# Patient Record
Sex: FEMALE | Race: WHITE | NOT HISPANIC OR LATINO | URBAN - METROPOLITAN AREA
[De-identification: names, ages, dates, MRNs, and addresses within clinical notes are randomized per-mention and may not be internally consistent; named-entity substitution may affect disease eponyms.]

---

## 2019-08-30 ENCOUNTER — OUTPATIENT (OUTPATIENT)
Dept: OUTPATIENT SERVICES | Facility: HOSPITAL | Age: 44
LOS: 1 days | End: 2019-08-30

## 2019-08-31 ENCOUNTER — EMERGENCY (EMERGENCY)
Facility: HOSPITAL | Age: 44
LOS: 1 days | End: 2019-08-31
Admitting: EMERGENCY MEDICINE
Payer: MEDICAID

## 2019-08-31 PROCEDURE — 76080 X-RAY EXAM OF FISTULA: CPT | Mod: 26

## 2019-08-31 PROCEDURE — 99284 EMERGENCY DEPT VISIT MOD MDM: CPT

## 2019-10-22 ENCOUNTER — OUTPATIENT (OUTPATIENT)
Dept: OUTPATIENT SERVICES | Facility: HOSPITAL | Age: 44
LOS: 1 days | End: 2019-10-22

## 2019-10-22 ENCOUNTER — INPATIENT (INPATIENT)
Facility: HOSPITAL | Age: 44
LOS: 29 days | Discharge: NURSING, REHAB OR LONG-TERM CARE | End: 2019-11-21
Attending: FAMILY MEDICINE
Payer: MEDICAID

## 2019-10-22 PROCEDURE — 71045 X-RAY EXAM CHEST 1 VIEW: CPT | Mod: 26

## 2019-10-22 PROCEDURE — 74176 CT ABD & PELVIS W/O CONTRAST: CPT | Mod: 26

## 2019-10-22 PROCEDURE — 99291 CRITICAL CARE FIRST HOUR: CPT

## 2019-10-23 PROCEDURE — 74176 CT ABD & PELVIS W/O CONTRAST: CPT | Mod: 26

## 2019-10-23 PROCEDURE — 93010 ELECTROCARDIOGRAM REPORT: CPT | Mod: 76

## 2019-10-24 PROCEDURE — 93306 TTE W/DOPPLER COMPLETE: CPT | Mod: 26

## 2019-10-24 PROCEDURE — 71045 X-RAY EXAM CHEST 1 VIEW: CPT | Mod: 26

## 2019-10-25 ENCOUNTER — OUTPATIENT (OUTPATIENT)
Dept: OUTPATIENT SERVICES | Facility: HOSPITAL | Age: 44
LOS: 1 days | End: 2019-10-25

## 2019-10-26 ENCOUNTER — OUTPATIENT (OUTPATIENT)
Dept: OUTPATIENT SERVICES | Facility: HOSPITAL | Age: 44
LOS: 1 days | End: 2019-10-26

## 2019-10-26 PROCEDURE — 71045 X-RAY EXAM CHEST 1 VIEW: CPT | Mod: 26

## 2019-10-27 ENCOUNTER — OUTPATIENT (OUTPATIENT)
Dept: OUTPATIENT SERVICES | Facility: HOSPITAL | Age: 44
LOS: 1 days | End: 2019-10-27

## 2019-10-28 ENCOUNTER — OUTPATIENT (OUTPATIENT)
Dept: OUTPATIENT SERVICES | Facility: HOSPITAL | Age: 44
LOS: 1 days | End: 2019-10-28

## 2019-10-29 ENCOUNTER — OUTPATIENT (OUTPATIENT)
Dept: OUTPATIENT SERVICES | Facility: HOSPITAL | Age: 44
LOS: 1 days | End: 2019-10-29

## 2019-10-29 PROCEDURE — 93010 ELECTROCARDIOGRAM REPORT: CPT

## 2019-10-30 ENCOUNTER — OUTPATIENT (OUTPATIENT)
Dept: OUTPATIENT SERVICES | Facility: HOSPITAL | Age: 44
LOS: 1 days | End: 2019-10-30

## 2019-10-30 PROCEDURE — 71045 X-RAY EXAM CHEST 1 VIEW: CPT | Mod: 26

## 2019-10-30 PROCEDURE — 93970 EXTREMITY STUDY: CPT | Mod: 26

## 2019-10-31 ENCOUNTER — OUTPATIENT (OUTPATIENT)
Dept: OUTPATIENT SERVICES | Facility: HOSPITAL | Age: 44
LOS: 1 days | End: 2019-10-31

## 2019-11-01 PROCEDURE — 71045 X-RAY EXAM CHEST 1 VIEW: CPT | Mod: 26

## 2019-11-03 ENCOUNTER — OUTPATIENT (OUTPATIENT)
Dept: OUTPATIENT SERVICES | Facility: HOSPITAL | Age: 44
LOS: 1 days | End: 2019-11-03

## 2019-11-04 ENCOUNTER — OUTPATIENT (OUTPATIENT)
Dept: OUTPATIENT SERVICES | Facility: HOSPITAL | Age: 44
LOS: 1 days | End: 2019-11-04

## 2019-11-04 PROCEDURE — 70450 CT HEAD/BRAIN W/O DYE: CPT | Mod: 26

## 2019-11-05 ENCOUNTER — OUTPATIENT (OUTPATIENT)
Dept: OUTPATIENT SERVICES | Facility: HOSPITAL | Age: 44
LOS: 1 days | End: 2019-11-05

## 2019-11-06 ENCOUNTER — OUTPATIENT (OUTPATIENT)
Dept: OUTPATIENT SERVICES | Facility: HOSPITAL | Age: 44
LOS: 1 days | End: 2019-11-06

## 2019-11-07 ENCOUNTER — OUTPATIENT (OUTPATIENT)
Dept: OUTPATIENT SERVICES | Facility: HOSPITAL | Age: 44
LOS: 1 days | End: 2019-11-07

## 2019-11-08 ENCOUNTER — OUTPATIENT (OUTPATIENT)
Dept: OUTPATIENT SERVICES | Facility: HOSPITAL | Age: 44
LOS: 1 days | End: 2019-11-08

## 2019-11-09 ENCOUNTER — OUTPATIENT (OUTPATIENT)
Dept: OUTPATIENT SERVICES | Facility: HOSPITAL | Age: 44
LOS: 1 days | End: 2019-11-09

## 2019-11-10 ENCOUNTER — OUTPATIENT (OUTPATIENT)
Dept: OUTPATIENT SERVICES | Facility: HOSPITAL | Age: 44
LOS: 1 days | End: 2019-11-10

## 2019-11-11 ENCOUNTER — OUTPATIENT (OUTPATIENT)
Dept: OUTPATIENT SERVICES | Facility: HOSPITAL | Age: 44
LOS: 1 days | End: 2019-11-11

## 2019-11-12 ENCOUNTER — OUTPATIENT (OUTPATIENT)
Dept: OUTPATIENT SERVICES | Facility: HOSPITAL | Age: 44
LOS: 1 days | End: 2019-11-12

## 2019-11-13 ENCOUNTER — OUTPATIENT (OUTPATIENT)
Dept: OUTPATIENT SERVICES | Facility: HOSPITAL | Age: 44
LOS: 1 days | End: 2019-11-13

## 2019-11-14 ENCOUNTER — OUTPATIENT (OUTPATIENT)
Dept: OUTPATIENT SERVICES | Facility: HOSPITAL | Age: 44
LOS: 1 days | End: 2019-11-14

## 2019-11-15 ENCOUNTER — OUTPATIENT (OUTPATIENT)
Dept: OUTPATIENT SERVICES | Facility: HOSPITAL | Age: 44
LOS: 1 days | End: 2019-11-15

## 2019-11-16 ENCOUNTER — OUTPATIENT (OUTPATIENT)
Dept: OUTPATIENT SERVICES | Facility: HOSPITAL | Age: 44
LOS: 1 days | End: 2019-11-16

## 2019-11-17 ENCOUNTER — OUTPATIENT (OUTPATIENT)
Dept: OUTPATIENT SERVICES | Facility: HOSPITAL | Age: 44
LOS: 1 days | End: 2019-11-17

## 2019-11-17 PROCEDURE — 71045 X-RAY EXAM CHEST 1 VIEW: CPT | Mod: 26

## 2019-11-18 ENCOUNTER — OUTPATIENT (OUTPATIENT)
Dept: OUTPATIENT SERVICES | Facility: HOSPITAL | Age: 44
LOS: 1 days | End: 2019-11-18

## 2019-11-19 ENCOUNTER — OUTPATIENT (OUTPATIENT)
Dept: OUTPATIENT SERVICES | Facility: HOSPITAL | Age: 44
LOS: 1 days | End: 2019-11-19

## 2019-11-20 ENCOUNTER — OUTPATIENT (OUTPATIENT)
Dept: OUTPATIENT SERVICES | Facility: HOSPITAL | Age: 44
LOS: 1 days | End: 2019-11-20

## 2019-11-21 ENCOUNTER — OUTPATIENT (OUTPATIENT)
Dept: OUTPATIENT SERVICES | Facility: HOSPITAL | Age: 44
LOS: 1 days | End: 2019-11-21

## 2019-11-22 ENCOUNTER — OUTPATIENT (OUTPATIENT)
Dept: OUTPATIENT SERVICES | Facility: HOSPITAL | Age: 44
LOS: 1 days | End: 2019-11-22

## 2019-11-29 ENCOUNTER — OUTPATIENT (OUTPATIENT)
Dept: OUTPATIENT SERVICES | Facility: HOSPITAL | Age: 44
LOS: 1 days | End: 2019-11-29

## 2019-11-30 ENCOUNTER — INPATIENT (INPATIENT)
Facility: HOSPITAL | Age: 44
LOS: 10 days | Discharge: NURSING, REHAB OR LONG-TERM CARE | End: 2019-12-11
Admitting: FAMILY MEDICINE
Payer: MEDICAID

## 2019-11-30 ENCOUNTER — OUTPATIENT (OUTPATIENT)
Dept: OUTPATIENT SERVICES | Facility: HOSPITAL | Age: 44
LOS: 1 days | End: 2019-11-30

## 2019-11-30 PROCEDURE — 71045 X-RAY EXAM CHEST 1 VIEW: CPT | Mod: 26

## 2019-11-30 PROCEDURE — 99285 EMERGENCY DEPT VISIT HI MDM: CPT

## 2019-12-01 PROCEDURE — 93970 EXTREMITY STUDY: CPT | Mod: 26

## 2019-12-03 ENCOUNTER — OUTPATIENT (OUTPATIENT)
Dept: OUTPATIENT SERVICES | Facility: HOSPITAL | Age: 44
LOS: 1 days | End: 2019-12-03

## 2019-12-04 ENCOUNTER — OUTPATIENT (OUTPATIENT)
Dept: OUTPATIENT SERVICES | Facility: HOSPITAL | Age: 44
LOS: 1 days | End: 2019-12-04

## 2019-12-05 ENCOUNTER — OUTPATIENT (OUTPATIENT)
Dept: OUTPATIENT SERVICES | Facility: HOSPITAL | Age: 44
LOS: 1 days | End: 2019-12-05

## 2019-12-06 ENCOUNTER — OUTPATIENT (OUTPATIENT)
Dept: OUTPATIENT SERVICES | Facility: HOSPITAL | Age: 44
LOS: 1 days | End: 2019-12-06

## 2019-12-07 ENCOUNTER — OUTPATIENT (OUTPATIENT)
Dept: OUTPATIENT SERVICES | Facility: HOSPITAL | Age: 44
LOS: 1 days | End: 2019-12-07

## 2019-12-08 ENCOUNTER — OUTPATIENT (OUTPATIENT)
Dept: OUTPATIENT SERVICES | Facility: HOSPITAL | Age: 44
LOS: 1 days | End: 2019-12-08

## 2019-12-09 ENCOUNTER — OUTPATIENT (OUTPATIENT)
Dept: OUTPATIENT SERVICES | Facility: HOSPITAL | Age: 44
LOS: 1 days | End: 2019-12-09

## 2019-12-10 ENCOUNTER — OUTPATIENT (OUTPATIENT)
Dept: OUTPATIENT SERVICES | Facility: HOSPITAL | Age: 44
LOS: 1 days | End: 2019-12-10

## 2019-12-10 PROCEDURE — 71045 X-RAY EXAM CHEST 1 VIEW: CPT | Mod: 26

## 2019-12-11 ENCOUNTER — EMERGENCY (EMERGENCY)
Facility: HOSPITAL | Age: 44
LOS: 1 days | End: 2019-12-11
Admitting: EMERGENCY MEDICINE
Payer: MEDICAID

## 2019-12-11 ENCOUNTER — OUTPATIENT (OUTPATIENT)
Dept: OUTPATIENT SERVICES | Facility: HOSPITAL | Age: 44
LOS: 1 days | End: 2019-12-11

## 2019-12-11 PROCEDURE — 99282 EMERGENCY DEPT VISIT SF MDM: CPT

## 2019-12-12 ENCOUNTER — OUTPATIENT (OUTPATIENT)
Dept: OUTPATIENT SERVICES | Facility: HOSPITAL | Age: 44
LOS: 1 days | End: 2019-12-12

## 2019-12-16 ENCOUNTER — INPATIENT (INPATIENT)
Facility: HOSPITAL | Age: 44
LOS: 1 days | Discharge: ANOTHER HOSPITAL | End: 2019-12-18
Payer: MEDICAID

## 2019-12-16 ENCOUNTER — OUTPATIENT (OUTPATIENT)
Dept: OUTPATIENT SERVICES | Facility: HOSPITAL | Age: 44
LOS: 1 days | End: 2019-12-16

## 2019-12-16 PROCEDURE — 99284 EMERGENCY DEPT VISIT MOD MDM: CPT

## 2019-12-16 PROCEDURE — 71045 X-RAY EXAM CHEST 1 VIEW: CPT | Mod: 26

## 2019-12-17 ENCOUNTER — OUTPATIENT (OUTPATIENT)
Dept: OUTPATIENT SERVICES | Facility: HOSPITAL | Age: 44
LOS: 1 days | End: 2019-12-17

## 2019-12-17 PROCEDURE — 93970 EXTREMITY STUDY: CPT | Mod: 26

## 2019-12-17 PROCEDURE — 99255 IP/OBS CONSLTJ NEW/EST HI 80: CPT

## 2019-12-17 PROCEDURE — 74177 CT ABD & PELVIS W/CONTRAST: CPT | Mod: 26

## 2019-12-18 ENCOUNTER — OUTPATIENT (OUTPATIENT)
Dept: OUTPATIENT SERVICES | Facility: HOSPITAL | Age: 44
LOS: 1 days | End: 2019-12-18

## 2019-12-18 ENCOUNTER — INPATIENT (INPATIENT)
Facility: HOSPITAL | Age: 44
LOS: 1 days | Discharge: HOME | DRG: 377 | End: 2019-12-20
Attending: INTERNAL MEDICINE | Admitting: HOSPITALIST
Payer: MEDICAID

## 2019-12-18 ENCOUNTER — TRANSCRIPTION ENCOUNTER (OUTPATIENT)
Age: 44
End: 2019-12-18

## 2019-12-18 VITALS
OXYGEN SATURATION: 99 % | DIASTOLIC BLOOD PRESSURE: 67 MMHG | TEMPERATURE: 99 F | RESPIRATION RATE: 14 BRPM | HEIGHT: 67 IN | SYSTOLIC BLOOD PRESSURE: 104 MMHG | WEIGHT: 119.93 LBS | HEART RATE: 79 BPM

## 2019-12-18 PROCEDURE — 99232 SBSQ HOSP IP/OBS MODERATE 35: CPT

## 2019-12-18 PROCEDURE — 99285 EMERGENCY DEPT VISIT HI MDM: CPT | Mod: 25

## 2019-12-18 PROCEDURE — 74175 CTA ABDOMEN W/CONTRAST: CPT | Mod: 26

## 2019-12-18 NOTE — ED ADULT NURSE NOTE - CHIEF COMPLAINT QUOTE
Patient A&Ox4, denies any pain or discomfort. Patient unable to speak but "mouths" words. Patient has Hx of TBI, sent from [facility] [place] due to GI bleed & needs to have surgery at [facility]. Hx of gastric bypass. Received 4 total units PRBC at [facility] [place].

## 2019-12-18 NOTE — ED ADULT NURSE NOTE - OBJECTIVE STATEMENT
Assumed pt care, pt is transfer from [facility] [place], pt is A+Ox4, offers no complaints of pain or discomfort. Pt received 4 units PRBC's at [facility] for GI bleed, pt arrives with Protonix gtt (finished, going through pt R arm PICC.) Pt states she initially was taken to [facility] for PRBPR. Pt has hx of TBI, unable to move extremities independently, pt has trach collar, no ventilator or trach at this time, open to air. Pt able to mouth words, and whisper slightly to communicate with staff. Assumed pt care, pt is transfer from [facility] [place], pt is A+Ox4, offers no complaints of pain or discomfort. Pt received 4 units PRBC's at [facility] for GI bleed, pt arrives with Protonix gtt (finished, going through pt R arm PICC.) Pt states she initially was taken to [facility] for black tarry stool. Pt has hx of TBI, unable to move extremities independently, pt has trach collar, no ventilator or trach at this time, open to air. Pt able to mouth words, and whisper slightly to communicate with staff. Assumed pt care, pt is transfer from [facility], pt is A+Ox4, offers no complaints of pain or discomfort. Pt received 4 units PRBC's at [facility] for GI bleed, pt arrives with Protonix gtt (finished, going through pt R arm PICC.) Pt states she initially was taken to [facility] for black tarry stool. Pt has hx of TBI, unable to move extremities independently, pt has trach collar, no ventilator or trach at this time, open to air. Pt able to mouth words, and whisper slightly to communicate with staff. Pt has midline R bicep placed "last week." Pt has [place] catheter placed "two days ago."
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
## 2019-12-18 NOTE — ED ADULT NURSE NOTE - ED STAT RN HANDOFF DETAILS
Pt handed off to RN WENDI Mckeon in stable condition. Pt remains on cardiac monitor and , oriented to unit, plan of care explained. Call bell given to pt and call bell system explained to pt, pt safety maintained. No apparent distress noted at this time. Receiving RN without any questions or concerns at time of handoff. Pt handed off to RN WENDI Banuelos in stable condition. Pt remains on cardiac monitor and , oriented to unit, plan of care explained. Call bell given to pt and call bell system explained to pt, pt safety maintained. No apparent distress noted at this time. Receiving RN without any questions or concerns at time of handoff.

## 2019-12-19 DIAGNOSIS — M54.2 CERVICALGIA: Chronic | ICD-10-CM

## 2019-12-19 DIAGNOSIS — K92.2 GASTROINTESTINAL HEMORRHAGE, UNSPECIFIED: ICD-10-CM

## 2019-12-19 DIAGNOSIS — Z98.84 BARIATRIC SURGERY STATUS: Chronic | ICD-10-CM

## 2019-12-19 DIAGNOSIS — I82.409 ACUTE EMBOLISM AND THROMBOSIS OF UNSPECIFIED DEEP VEINS OF UNSPECIFIED LOWER EXTREMITY: ICD-10-CM

## 2019-12-19 DIAGNOSIS — K92.1 MELENA: ICD-10-CM

## 2019-12-19 DIAGNOSIS — Z86.69 PERSONAL HISTORY OF OTHER DISEASES OF THE NERVOUS SYSTEM AND SENSE ORGANS: ICD-10-CM

## 2019-12-19 LAB
ABO RH CONFIRMATION: SIGNIFICANT CHANGE UP
ALBUMIN SERPL ELPH-MCNC: 2.3 G/DL — LOW (ref 3.3–5.2)
ALP SERPL-CCNC: 89 U/L — SIGNIFICANT CHANGE UP (ref 40–120)
ALT FLD-CCNC: 9 U/L — SIGNIFICANT CHANGE UP
ANION GAP SERPL CALC-SCNC: 9 MMOL/L — SIGNIFICANT CHANGE UP (ref 5–17)
APTT BLD: 31.8 SEC — SIGNIFICANT CHANGE UP (ref 27.5–36.3)
AST SERPL-CCNC: 19 U/L — SIGNIFICANT CHANGE UP
BASOPHILS # BLD AUTO: 0.03 K/UL — SIGNIFICANT CHANGE UP (ref 0–0.2)
BASOPHILS NFR BLD AUTO: 0.4 % — SIGNIFICANT CHANGE UP (ref 0–2)
BILIRUB SERPL-MCNC: 0.3 MG/DL — LOW (ref 0.4–2)
BLD GP AB SCN SERPL QL: SIGNIFICANT CHANGE UP
BUN SERPL-MCNC: 9 MG/DL — SIGNIFICANT CHANGE UP (ref 8–20)
CALCIUM SERPL-MCNC: 8.7 MG/DL — SIGNIFICANT CHANGE UP (ref 8.6–10.2)
CHLORIDE SERPL-SCNC: 112 MMOL/L — HIGH (ref 98–107)
CO2 SERPL-SCNC: 23 MMOL/L — SIGNIFICANT CHANGE UP (ref 22–29)
CREAT SERPL-MCNC: 0.58 MG/DL — SIGNIFICANT CHANGE UP (ref 0.5–1.3)
EOSINOPHIL # BLD AUTO: 0.37 K/UL — SIGNIFICANT CHANGE UP (ref 0–0.5)
EOSINOPHIL NFR BLD AUTO: 4.7 % — SIGNIFICANT CHANGE UP (ref 0–6)
GLUCOSE BLDC GLUCOMTR-MCNC: 79 MG/DL — SIGNIFICANT CHANGE UP (ref 70–99)
GLUCOSE SERPL-MCNC: 89 MG/DL — SIGNIFICANT CHANGE UP (ref 70–115)
HCT VFR BLD CALC: 34.7 % — SIGNIFICANT CHANGE UP (ref 34.5–45)
HCT VFR BLD CALC: 36.3 % — SIGNIFICANT CHANGE UP (ref 34.5–45)
HGB BLD-MCNC: 10.8 G/DL — LOW (ref 11.5–15.5)
HGB BLD-MCNC: 11.4 G/DL — LOW (ref 11.5–15.5)
IMM GRANULOCYTES NFR BLD AUTO: 1.5 % — SIGNIFICANT CHANGE UP (ref 0–1.5)
INR BLD: 1.35 RATIO — HIGH (ref 0.88–1.16)
LIDOCAIN IGE QN: 19 U/L — LOW (ref 22–51)
LYMPHOCYTES # BLD AUTO: 1.38 K/UL — SIGNIFICANT CHANGE UP (ref 1–3.3)
LYMPHOCYTES # BLD AUTO: 17.7 % — SIGNIFICANT CHANGE UP (ref 13–44)
MCHC RBC-ENTMCNC: 27.6 PG — SIGNIFICANT CHANGE UP (ref 27–34)
MCHC RBC-ENTMCNC: 31.4 GM/DL — LOW (ref 32–36)
MCV RBC AUTO: 87.9 FL — SIGNIFICANT CHANGE UP (ref 80–100)
MONOCYTES # BLD AUTO: 0.58 K/UL — SIGNIFICANT CHANGE UP (ref 0–0.9)
MONOCYTES NFR BLD AUTO: 7.4 % — SIGNIFICANT CHANGE UP (ref 2–14)
NEUTROPHILS # BLD AUTO: 5.33 K/UL — SIGNIFICANT CHANGE UP (ref 1.8–7.4)
NEUTROPHILS NFR BLD AUTO: 68.3 % — SIGNIFICANT CHANGE UP (ref 43–77)
PLATELET # BLD AUTO: 496 K/UL — HIGH (ref 150–400)
POTASSIUM SERPL-MCNC: 3.9 MMOL/L — SIGNIFICANT CHANGE UP (ref 3.5–5.3)
POTASSIUM SERPL-SCNC: 3.9 MMOL/L — SIGNIFICANT CHANGE UP (ref 3.5–5.3)
PROT SERPL-MCNC: 6.5 G/DL — LOW (ref 6.6–8.7)
PROTHROM AB SERPL-ACNC: 15.7 SEC — HIGH (ref 10–12.9)
RBC # BLD: 4.13 M/UL — SIGNIFICANT CHANGE UP (ref 3.8–5.2)
RBC # FLD: 17.4 % — HIGH (ref 10.3–14.5)
SODIUM SERPL-SCNC: 144 MMOL/L — SIGNIFICANT CHANGE UP (ref 135–145)
WBC # BLD: 7.81 K/UL — SIGNIFICANT CHANGE UP (ref 3.8–10.5)
WBC # FLD AUTO: 7.81 K/UL — SIGNIFICANT CHANGE UP (ref 3.8–10.5)

## 2019-12-19 PROCEDURE — 99253 IP/OBS CNSLTJ NEW/EST LOW 45: CPT

## 2019-12-19 PROCEDURE — 43235 EGD DIAGNOSTIC BRUSH WASH: CPT

## 2019-12-19 PROCEDURE — 99223 1ST HOSP IP/OBS HIGH 75: CPT

## 2019-12-19 PROCEDURE — 12345: CPT | Mod: NC,GC

## 2019-12-19 PROCEDURE — 99223 1ST HOSP IP/OBS HIGH 75: CPT | Mod: 25

## 2019-12-19 RX ORDER — PANTOPRAZOLE SODIUM 20 MG/1
40 TABLET, DELAYED RELEASE ORAL
Refills: 0 | Status: DISCONTINUED | OUTPATIENT
Start: 2019-12-19 | End: 2019-12-20

## 2019-12-19 RX ORDER — CHLORHEXIDINE GLUCONATE 213 G/1000ML
1 SOLUTION TOPICAL
Refills: 0 | Status: DISCONTINUED | OUTPATIENT
Start: 2019-12-19 | End: 2019-12-19

## 2019-12-19 RX ORDER — SODIUM CHLORIDE 9 MG/ML
1000 INJECTION, SOLUTION INTRAVENOUS
Refills: 0 | Status: DISCONTINUED | OUTPATIENT
Start: 2019-12-19 | End: 2019-12-19

## 2019-12-19 RX ORDER — LIDOCAINE 4 G/100G
1 CREAM TOPICAL EVERY 24 HOURS
Refills: 0 | Status: DISCONTINUED | OUTPATIENT
Start: 2019-12-19 | End: 2019-12-20

## 2019-12-19 RX ORDER — OXYCODONE HYDROCHLORIDE 5 MG/1
10 TABLET ORAL EVERY 6 HOURS
Refills: 0 | Status: DISCONTINUED | OUTPATIENT
Start: 2019-12-19 | End: 2019-12-20

## 2019-12-19 RX ORDER — SODIUM CHLORIDE 9 MG/ML
10 INJECTION INTRAMUSCULAR; INTRAVENOUS; SUBCUTANEOUS
Refills: 0 | Status: DISCONTINUED | OUTPATIENT
Start: 2019-12-19 | End: 2019-12-20

## 2019-12-19 RX ORDER — CHLORHEXIDINE GLUCONATE 213 G/1000ML
1 SOLUTION TOPICAL DAILY
Refills: 0 | Status: DISCONTINUED | OUTPATIENT
Start: 2019-12-19 | End: 2019-12-20

## 2019-12-19 RX ORDER — OXYCODONE HYDROCHLORIDE 5 MG/1
10 TABLET ORAL ONCE
Refills: 0 | Status: DISCONTINUED | OUTPATIENT
Start: 2019-12-19 | End: 2019-12-19

## 2019-12-19 RX ORDER — PANTOPRAZOLE SODIUM 20 MG/1
8 TABLET, DELAYED RELEASE ORAL
Qty: 80 | Refills: 0 | Status: DISCONTINUED | OUTPATIENT
Start: 2019-12-19 | End: 2019-12-19

## 2019-12-19 RX ADMIN — PANTOPRAZOLE SODIUM 40 MILLIGRAM(S): 20 TABLET, DELAYED RELEASE ORAL at 16:06

## 2019-12-19 RX ADMIN — OXYCODONE HYDROCHLORIDE 10 MILLIGRAM(S): 5 TABLET ORAL at 03:52

## 2019-12-19 RX ADMIN — OXYCODONE HYDROCHLORIDE 10 MILLIGRAM(S): 5 TABLET ORAL at 20:53

## 2019-12-19 RX ADMIN — LIDOCAINE 1 PATCH: 4 CREAM TOPICAL at 13:15

## 2019-12-19 RX ADMIN — SODIUM CHLORIDE 75 MILLILITER(S): 9 INJECTION, SOLUTION INTRAVENOUS at 10:17

## 2019-12-19 RX ADMIN — OXYCODONE HYDROCHLORIDE 10 MILLIGRAM(S): 5 TABLET ORAL at 04:50

## 2019-12-19 RX ADMIN — OXYCODONE HYDROCHLORIDE 10 MILLIGRAM(S): 5 TABLET ORAL at 21:38

## 2019-12-19 RX ADMIN — PANTOPRAZOLE SODIUM 10 MG/HR: 20 TABLET, DELAYED RELEASE ORAL at 03:52

## 2019-12-19 RX ADMIN — LIDOCAINE 1 PATCH: 4 CREAM TOPICAL at 19:41

## 2019-12-19 NOTE — H&P ADULT - NSHPSOCIALHISTORY_GEN_ALL_CORE
denies ever smoking, etoh use other drugs  used to be nurse prior to accident  lives in [facility]
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
## 2019-12-19 NOTE — CONSULT NOTE ADULT - ATTENDING COMMENTS
Agree with note above    Continue PPI and carafate  Will followup results of upper endoscopy  Bariatric surgery team to follow

## 2019-12-19 NOTE — ED PROVIDER NOTE - PROGRESS NOTE DETAILS
Spoke to surgery resident at this time. Dr. Abraham, bariatric surgery to see pt while admitted to medicine service.

## 2019-12-19 NOTE — BRIEF OPERATIVE NOTE - OPERATION/FINDINGS
circumferential acute white based ulcer at the entrance to the blind limg with a suture at the base and friable edges but no active bleeding and no old or fresh blood present. The efferent limb was normal including the anastomosis. The small gastric remanant and esophagus were normal as well.

## 2019-12-19 NOTE — ED PROVIDER NOTE - OBJECTIVE STATEMENT
45yo female pmhx TBI presents to ED transfer from [facility] [place] for admission to medicine for GI bleed. Pt found to have large episode of dark tarry stool while in nursing home, sent to [facility] [place]. Pt transfused 4 units prbc while admitted, eliquis held. No events during transit. Pt hemodynamically stable upon presentation. GI Dr. Canas aware, Surgery resident aware, Dr. Abraham to see. Pt offering no complaints at this time, feeling well. Pt accepted by Hospitalist Dr. Carballo. Denies fever, chills, CP, lightheadedness, SOB, abdominal pain, n/v/d.
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
## 2019-12-19 NOTE — ED PROVIDER NOTE - PHYSICAL EXAMINATION
Const: Awake, alert and oriented. In no acute distress. Well appearing. Pt does not speak, has tracheostomy   HEENT: NC/AT. Moist mucous membranes. Trach site clean, no surrounding erythema  Eyes: No scleral icterus. EOMI.  Neck:. Soft and supple. Full ROM without pain.  Cardiac: Regular rate and regular rhythm. +S1/S2. Peripheral pulses 2+ and symmetric.   Resp: Speaking in full sentences. No evidence of respiratory distress. No wheezes, rales or rhonchi.  Abd: Soft, non-tender, non-distended. Normal bowel sounds in all 4 quadrants. No guarding or rebound.  Back: Spine midline and non-tender. No CVAT.  Skin: +Multiple sacral wounds noted of varying depths. No rashes, abrasions or lacerations.  Neuro: Awake, alert & oriented x 3. Moves all extremities symmetrically.

## 2019-12-19 NOTE — ED PROVIDER NOTE - ATTENDING CONTRIBUTION TO CARE
I personally saw the patient with the PA, and completed the key components of the history and physical exam. I then discussed the management plan with the PA.   gen in nad resp clear cardiac no murmur abd soft nt neuro itnact

## 2019-12-19 NOTE — ED PROVIDER NOTE - NS ED ROS FT
Gen: denies fever, chills, fatigue, weight loss  Skin: denies rashes, laceration, bruising  HEENT: denies visual changes, ear pain, nasal congestion, throat pain  Respiratory: denies RUANO, SOB, cough, wheezing  Cardiovascular: denies chest pain, palpitations, diaphoresis, LE edema  GI: +GI bleed. Denies abdominal pain, n/v/d  : denies dysuria, frequency, urgency, bowel/bladder incontinence  MSK: denies joint swelling/pain, back pain, neck pain  Neuro: denies headache, dizziness, weakness, numbness  Psych: denies anxiety, depression, SI/HI, visual/auditory hallucinations

## 2019-12-19 NOTE — ED PROVIDER NOTE - CLINICAL SUMMARY MEDICAL DECISION MAKING FREE TEXT BOX
Pt transferred from [facility] for admission to medicine and bariatric surgery to see, GI aware. Pt in NAD at this time, hemodynamically stable, offering no complaints. Received 4 units prbc at [facility]. Will admit to medicine.

## 2019-12-19 NOTE — H&P ADULT - NSHPREVIEWOFSYSTEMS_GEN_ALL_CORE
REVIEW OF SYSTEMS:    CONSTITUTIONAL: No weakness, fevers or chills  EYES/ENT: No visual changes;  No vertigo or throat pain   NECK: No pain or stiffness  RESPIRATORY: No cough, wheezing, hemoptysis; No shortness of breath  CARDIOVASCULAR: No chest pain or palpitations  GASTROINTESTINAL: see HPI  GENITOURINARY: +chronic arthur, can't experience pain  NEUROLOGICAL: +paraplegia, chronic  SKIN: +decub ulcer   All other review of systems is negative unless indicated above.

## 2019-12-19 NOTE — H&P ADULT - PROBLEM SELECTOR PLAN 1
pt with recurrent bleed most likely 2/2 ulcer at anastamosis site  will start on PPI gtt  check h/h now s/p 2 more units since last hgb of 7.3 (4 total in last day)  vitals stable unlikely actively bleeding  if has another large episode of melena will get stat CTA to identify source of bleed  Surgery and GI consulted,   will keep NPO for now in case needs repeat EGD in am, if no EGD please start on diet.   continue to trend h/h Q8 and transfuse below 7

## 2019-12-19 NOTE — H&P ADULT - NSICDXPASTSURGICALHX_GEN_ALL_CORE_FT
PAST SURGICAL HISTORY:  Chronic neck pain with history of cervical spinal surgery     History of Lorenza-en-Y gastric bypass

## 2019-12-19 NOTE — ED ADULT NURSE REASSESSMENT NOTE - NS ED NURSE REASSESS COMMENT FT1
As per MD, pt ordered for PO pain relief as taken at home, pt able to take medication despite NPO status. Pt passed dysphagia screening and to be medicated as ordered.
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
## 2019-12-19 NOTE — H&P ADULT - NSHPPHYSICALEXAM_GEN_ALL_CORE
Vital Signs Last 24 Hrs  T(C): 37 (18 Dec 2019 23:14), Max: 37 (18 Dec 2019 23:14)  T(F): 98.6 (18 Dec 2019 23:14), Max: 98.6 (18 Dec 2019 23:14)  HR: 65 (19 Dec 2019 02:20) (65 - 79)  BP: 104/57 (19 Dec 2019 02:20) (104/57 - 104/67)  BP(mean): --  RR: 16 (19 Dec 2019 02:20) (14 - 16)  SpO2: 97% (19 Dec 2019 02:20) (97% - 99%)    GENERAL: NAD  HEENT:  Atraumatic, Normocephalic, MMM, no oropharyngeal lesions  EYES: EOMI, PERRLA, conjunctiva and sclera clear  NECK: Supple, No JVD, +trach stoma presents, clean dry and intact, limited ROM  CHEST/LUNG: Clear to auscultation anteriorly, No wheezes, rales, or rhonchi  HEART: Regular rate and rhythm; No murmurs, rubs, or gallops  ABDOMEN: Soft,  non distended, unable to feel pain  : chronic arthur in place  EXTREMITIES:  2+ Peripheral Pulses, No clubbing, cyanosis, or edema, RUE midline, not flushing  PSYCH: AAOx3, normal affect  NEUROLOGY: 4/5 strength in upper extremities. No sensation from chest done, unable to move LE
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
## 2019-12-19 NOTE — ED ADULT NURSE REASSESSMENT NOTE - NS ED NURSE REASSESS COMMENT FT1
PA LB at bedside for US guided IV, unable to obtain. Escalated to MD Mcclain/MD Jacobs at this point. Pt lab work still unable to be drawn and Protonix gtt not started as patency of Midline unknown as previously stated in nursing reassessment notes. Medical team aware and intervening as necessary.

## 2019-12-19 NOTE — BRIEF OPERATIVE NOTE - COMMENTS
suggest either IVC filter with no anticoagulation or revision surgery. Continue pantoprazole 40mg PO BID in the meantime.
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
## 2019-12-19 NOTE — H&P ADULT - NSICDXPASTMEDICALHX_GEN_ALL_CORE_FT
PAST MEDICAL HISTORY:  DVT, lower extremity     History of paraplegia     TBI (traumatic brain injury)

## 2019-12-19 NOTE — CHART NOTE - NSCHARTNOTEFT_GEN_A_CORE
Pt's sister Lillian ([phone]) called and updated about pt's plan of care. Explained pts current status, intervention provided, and plan of care.  Sister stated she will be coming to hospital to visit tomorrow.
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
## 2019-12-19 NOTE — CONSULT NOTE ADULT - PROBLEM SELECTOR RECOMMENDATION 9
by history but no melena at this time. Recent anastomotic ulcer, possibly ischemic. May have had prior ulcers that did not bleed prior to starting Eliquis. Will repeat EGD and if ulcer present options would be IVC filter and no anticoagulation versus surgical revision by bariatric surgery.

## 2019-12-19 NOTE — ED ADULT NURSE REASSESSMENT NOTE - NS ED NURSE REASSESS COMMENT FT1
RN unable to get blood return from pt Midline. MARIA ANTONIA Palmer made aware and at bedside to flush/blood return assessment from R midline. Unable to flush line easily, unable to get blood return at this time. DONYA aPtel made aware. Pt still does not have any labs drawn at this time due to circumstances as charted. PA LB aware and as pt PA ,team to initiate bedside ultrasound guided IV placement at this time.

## 2019-12-20 ENCOUNTER — TRANSCRIPTION ENCOUNTER (OUTPATIENT)
Age: 44
End: 2019-12-20

## 2019-12-20 VITALS
TEMPERATURE: 99 F | SYSTOLIC BLOOD PRESSURE: 98 MMHG | HEART RATE: 66 BPM | RESPIRATION RATE: 18 BRPM | DIASTOLIC BLOOD PRESSURE: 61 MMHG | OXYGEN SATURATION: 94 %

## 2019-12-20 DIAGNOSIS — K25.4 CHRONIC OR UNSPECIFIED GASTRIC ULCER WITH HEMORRHAGE: ICD-10-CM

## 2019-12-20 LAB
BASOPHILS # BLD AUTO: 0.04 K/UL — SIGNIFICANT CHANGE UP (ref 0–0.2)
BASOPHILS NFR BLD AUTO: 0.5 % — SIGNIFICANT CHANGE UP (ref 0–2)
CRP SERPL-MCNC: 6.11 MG/DL — HIGH (ref 0–0.4)
EOSINOPHIL # BLD AUTO: 0.24 K/UL — SIGNIFICANT CHANGE UP (ref 0–0.5)
EOSINOPHIL NFR BLD AUTO: 3.2 % — SIGNIFICANT CHANGE UP (ref 0–6)
ERYTHROCYTE [SEDIMENTATION RATE] IN BLOOD: 45 MM/HR — HIGH (ref 0–20)
HCT VFR BLD CALC: 32.3 % — LOW (ref 34.5–45)
HCT VFR BLD CALC: 33.1 % — LOW (ref 34.5–45)
HGB BLD-MCNC: 10 G/DL — LOW (ref 11.5–15.5)
HGB BLD-MCNC: 9.9 G/DL — LOW (ref 11.5–15.5)
IMM GRANULOCYTES NFR BLD AUTO: 0.8 % — SIGNIFICANT CHANGE UP (ref 0–1.5)
LYMPHOCYTES # BLD AUTO: 1.57 K/UL — SIGNIFICANT CHANGE UP (ref 1–3.3)
LYMPHOCYTES # BLD AUTO: 20.8 % — SIGNIFICANT CHANGE UP (ref 13–44)
MCHC RBC-ENTMCNC: 28.3 PG — SIGNIFICANT CHANGE UP (ref 27–34)
MCHC RBC-ENTMCNC: 31 GM/DL — LOW (ref 32–36)
MCV RBC AUTO: 91.5 FL — SIGNIFICANT CHANGE UP (ref 80–100)
MONOCYTES # BLD AUTO: 0.73 K/UL — SIGNIFICANT CHANGE UP (ref 0–0.9)
MONOCYTES NFR BLD AUTO: 9.7 % — SIGNIFICANT CHANGE UP (ref 2–14)
MRSA PCR RESULT.: SIGNIFICANT CHANGE UP
NEUTROPHILS # BLD AUTO: 4.89 K/UL — SIGNIFICANT CHANGE UP (ref 1.8–7.4)
NEUTROPHILS NFR BLD AUTO: 65 % — SIGNIFICANT CHANGE UP (ref 43–77)
PLATELET # BLD AUTO: 445 K/UL — HIGH (ref 150–400)
PROCALCITONIN SERPL-MCNC: 0.08 NG/ML — SIGNIFICANT CHANGE UP (ref 0.02–0.1)
RBC # BLD: 3.53 M/UL — LOW (ref 3.8–5.2)
RBC # FLD: 17.9 % — HIGH (ref 10.3–14.5)
S AUREUS DNA NOSE QL NAA+PROBE: DETECTED
WBC # BLD: 7.53 K/UL — SIGNIFICANT CHANGE UP (ref 3.8–10.5)
WBC # FLD AUTO: 7.53 K/UL — SIGNIFICANT CHANGE UP (ref 3.8–10.5)

## 2019-12-20 PROCEDURE — 86850 RBC ANTIBODY SCREEN: CPT

## 2019-12-20 PROCEDURE — 87640 STAPH A DNA AMP PROBE: CPT

## 2019-12-20 PROCEDURE — 85652 RBC SED RATE AUTOMATED: CPT

## 2019-12-20 PROCEDURE — 82962 GLUCOSE BLOOD TEST: CPT

## 2019-12-20 PROCEDURE — 99285 EMERGENCY DEPT VISIT HI MDM: CPT | Mod: 25

## 2019-12-20 PROCEDURE — 86900 BLOOD TYPING SEROLOGIC ABO: CPT

## 2019-12-20 PROCEDURE — 83690 ASSAY OF LIPASE: CPT

## 2019-12-20 PROCEDURE — 85730 THROMBOPLASTIN TIME PARTIAL: CPT

## 2019-12-20 PROCEDURE — 85018 HEMOGLOBIN: CPT

## 2019-12-20 PROCEDURE — 80053 COMPREHEN METABOLIC PANEL: CPT

## 2019-12-20 PROCEDURE — 36415 COLL VENOUS BLD VENIPUNCTURE: CPT

## 2019-12-20 PROCEDURE — 87641 MR-STAPH DNA AMP PROBE: CPT

## 2019-12-20 PROCEDURE — 36000 PLACE NEEDLE IN VEIN: CPT | Mod: LT

## 2019-12-20 PROCEDURE — 93970 EXTREMITY STUDY: CPT | Mod: 26

## 2019-12-20 PROCEDURE — 85610 PROTHROMBIN TIME: CPT

## 2019-12-20 PROCEDURE — 86901 BLOOD TYPING SEROLOGIC RH(D): CPT

## 2019-12-20 PROCEDURE — 84145 PROCALCITONIN (PCT): CPT

## 2019-12-20 PROCEDURE — 93970 EXTREMITY STUDY: CPT

## 2019-12-20 PROCEDURE — 86140 C-REACTIVE PROTEIN: CPT

## 2019-12-20 PROCEDURE — 99233 SBSQ HOSP IP/OBS HIGH 50: CPT

## 2019-12-20 PROCEDURE — 99239 HOSP IP/OBS DSCHRG MGMT >30: CPT | Mod: GC

## 2019-12-20 PROCEDURE — 85014 HEMATOCRIT: CPT

## 2019-12-20 PROCEDURE — 85027 COMPLETE CBC AUTOMATED: CPT

## 2019-12-20 RX ORDER — FERROUS SULFATE 325(65) MG
325 TABLET ORAL DAILY
Refills: 0 | Status: DISCONTINUED | OUTPATIENT
Start: 2019-12-20 | End: 2019-12-20

## 2019-12-20 RX ORDER — OXYCODONE HYDROCHLORIDE 5 MG/1
1 TABLET ORAL
Qty: 0 | Refills: 0 | DISCHARGE

## 2019-12-20 RX ORDER — MIRTAZAPINE 45 MG/1
1 TABLET, ORALLY DISINTEGRATING ORAL
Qty: 0 | Refills: 0 | DISCHARGE
Start: 2019-12-20

## 2019-12-20 RX ORDER — COLLAGENASE CLOSTRIDIUM HIST. 250 UNIT/G
1 OINTMENT (GRAM) TOPICAL
Qty: 0 | Refills: 0 | DISCHARGE
Start: 2019-12-20

## 2019-12-20 RX ORDER — FOLIC ACID 0.8 MG
1 TABLET ORAL DAILY
Refills: 0 | Status: DISCONTINUED | OUTPATIENT
Start: 2019-12-20 | End: 2019-12-20

## 2019-12-20 RX ORDER — FOLIC ACID 0.8 MG
1 TABLET ORAL
Qty: 0 | Refills: 0 | DISCHARGE
Start: 2019-12-20

## 2019-12-20 RX ORDER — IPRATROPIUM/ALBUTEROL SULFATE 18-103MCG
3 AEROSOL WITH ADAPTER (GRAM) INHALATION
Qty: 0 | Refills: 0 | DISCHARGE
Start: 2019-12-20

## 2019-12-20 RX ORDER — LEVETIRACETAM 250 MG/1
1 TABLET, FILM COATED ORAL
Qty: 0 | Refills: 0 | DISCHARGE
Start: 2019-12-20

## 2019-12-20 RX ORDER — NYSTATIN CREAM 100000 [USP'U]/G
1 CREAM TOPICAL THREE TIMES A DAY
Refills: 0 | Status: DISCONTINUED | OUTPATIENT
Start: 2019-12-20 | End: 2019-12-20

## 2019-12-20 RX ORDER — ZINC SULFATE TAB 220 MG (50 MG ZINC EQUIVALENT) 220 (50 ZN) MG
220 TAB ORAL DAILY
Refills: 0 | Status: DISCONTINUED | OUTPATIENT
Start: 2019-12-20 | End: 2019-12-20

## 2019-12-20 RX ORDER — PANTOPRAZOLE SODIUM 20 MG/1
1 TABLET, DELAYED RELEASE ORAL
Qty: 0 | Refills: 0 | DISCHARGE
Start: 2019-12-20

## 2019-12-20 RX ORDER — COLLAGENASE CLOSTRIDIUM HIST. 250 UNIT/G
1 OINTMENT (GRAM) TOPICAL DAILY
Refills: 0 | Status: DISCONTINUED | OUTPATIENT
Start: 2019-12-20 | End: 2019-12-20

## 2019-12-20 RX ORDER — MIRTAZAPINE 45 MG/1
15 TABLET, ORALLY DISINTEGRATING ORAL AT BEDTIME
Refills: 0 | Status: DISCONTINUED | OUTPATIENT
Start: 2019-12-20 | End: 2019-12-20

## 2019-12-20 RX ORDER — OXYCODONE HYDROCHLORIDE 5 MG/1
1 TABLET ORAL
Qty: 0 | Refills: 0 | DISCHARGE
Start: 2019-12-20

## 2019-12-20 RX ORDER — LEVETIRACETAM 250 MG/1
500 TABLET, FILM COATED ORAL
Refills: 0 | Status: DISCONTINUED | OUTPATIENT
Start: 2019-12-20 | End: 2019-12-20

## 2019-12-20 RX ORDER — ZINC SULFATE TAB 220 MG (50 MG ZINC EQUIVALENT) 220 (50 ZN) MG
1 TAB ORAL
Qty: 0 | Refills: 0 | DISCHARGE
Start: 2019-12-20

## 2019-12-20 RX ORDER — IPRATROPIUM/ALBUTEROL SULFATE 18-103MCG
3 AEROSOL WITH ADAPTER (GRAM) INHALATION EVERY 6 HOURS
Refills: 0 | Status: DISCONTINUED | OUTPATIENT
Start: 2019-12-20 | End: 2019-12-20

## 2019-12-20 RX ORDER — NYSTATIN CREAM 100000 [USP'U]/G
1 CREAM TOPICAL
Qty: 0 | Refills: 0 | DISCHARGE
Start: 2019-12-20

## 2019-12-20 RX ORDER — LACTOBACILLUS ACIDOPHILUS 100MM CELL
1 CAPSULE ORAL THREE TIMES A DAY
Refills: 0 | Status: DISCONTINUED | OUTPATIENT
Start: 2019-12-20 | End: 2019-12-20

## 2019-12-20 RX ADMIN — OXYCODONE HYDROCHLORIDE 10 MILLIGRAM(S): 5 TABLET ORAL at 12:26

## 2019-12-20 RX ADMIN — Medication 20 MILLIGRAM(S): at 12:28

## 2019-12-20 RX ADMIN — LIDOCAINE 1 PATCH: 4 CREAM TOPICAL at 12:27

## 2019-12-20 RX ADMIN — LIDOCAINE 1 PATCH: 4 CREAM TOPICAL at 19:16

## 2019-12-20 RX ADMIN — NYSTATIN CREAM 1 APPLICATION(S): 100000 CREAM TOPICAL at 13:51

## 2019-12-20 RX ADMIN — LIDOCAINE 1 PATCH: 4 CREAM TOPICAL at 01:10

## 2019-12-20 RX ADMIN — PANTOPRAZOLE SODIUM 40 MILLIGRAM(S): 20 TABLET, DELAYED RELEASE ORAL at 05:28

## 2019-12-20 RX ADMIN — ZINC SULFATE TAB 220 MG (50 MG ZINC EQUIVALENT) 220 MILLIGRAM(S): 220 (50 ZN) TAB at 12:29

## 2019-12-20 RX ADMIN — CHLORHEXIDINE GLUCONATE 1 APPLICATION(S): 213 SOLUTION TOPICAL at 12:36

## 2019-12-20 RX ADMIN — OXYCODONE HYDROCHLORIDE 10 MILLIGRAM(S): 5 TABLET ORAL at 12:56

## 2019-12-20 RX ADMIN — Medication 1 TABLET(S): at 13:51

## 2019-12-20 RX ADMIN — LEVETIRACETAM 500 MILLIGRAM(S): 250 TABLET, FILM COATED ORAL at 17:51

## 2019-12-20 RX ADMIN — LEVETIRACETAM 500 MILLIGRAM(S): 250 TABLET, FILM COATED ORAL at 12:28

## 2019-12-20 RX ADMIN — OXYCODONE HYDROCHLORIDE 10 MILLIGRAM(S): 5 TABLET ORAL at 02:55

## 2019-12-20 RX ADMIN — Medication 1 MILLIGRAM(S): at 12:26

## 2019-12-20 RX ADMIN — OXYCODONE HYDROCHLORIDE 10 MILLIGRAM(S): 5 TABLET ORAL at 03:53

## 2019-12-20 RX ADMIN — PANTOPRAZOLE SODIUM 40 MILLIGRAM(S): 20 TABLET, DELAYED RELEASE ORAL at 17:51

## 2019-12-20 RX ADMIN — Medication 325 MILLIGRAM(S): at 12:26

## 2019-12-20 NOTE — PROGRESS NOTE ADULT - PROBLEM SELECTOR PLAN 1
- S/P vasyl en Y. Had EGD- chronic anastomotic ulcer.   - awaiting vascular sx decision regarding IVF filter and stopping Elaquis.   trend hemoglobin. Today 9.9  - regular diet  - PPI IV BID and carafate tid
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
## 2019-12-20 NOTE — DISCHARGE NOTE PROVIDER - CARE PROVIDER_API CALL
Abbe Mcknight)  Gastroenterology; Internal Medicine  [address] [place] [address], [place] [address]  Phone: [phone]  Fax: [phone]  Follow Up Time:

## 2019-12-20 NOTE — DISCHARGE NOTE PROVIDER - NSDCFUADDINST_GEN_ALL_CORE_FT
10.0   7.53  )-----------( 445      ( 20 Dec 2019 15:12 )             32.3     12-19    144  |  112<H>  |  9.0  ----------------------------<  89  3.9   |  23.0  |  0.58    Ca    8.7      19 Dec 2019 03:18    TPro  6.5<L>  /  Alb  2.3<L>  /  TBili  0.3<L>  /  DBili  x   /  AST  19  /  ALT  9   /  AlkPhos  89  12-19    IMPRESSION:     No evidence of deep venous thrombosis in either lower extremity.    < end of copied text >

## 2019-12-20 NOTE — DISCHARGE NOTE NURSING/CASE MANAGEMENT/SOCIAL WORK - PATIENT PORTAL LINK FT
You can access the FollowMyHealth Patient Portal offered by [facility] by registering at the following website: [email or web address]. By joining [employer]’s FollowMyHealth portal, you will also be able to view your health information using other applications (apps) compatible with our system.

## 2019-12-20 NOTE — DISCHARGE NOTE PROVIDER - NSDCMRMEDTOKEN_GEN_ALL_CORE_FT
oxyCODONE 10 mg oral tablet: 1 tab(s) orally every 4 hours, As Needed collagenase 250 units/g topical ointment: 1 application topically once a day  folic acid 1 mg oral tablet: 1 tab(s) orally once a day  levETIRAcetam 500 mg oral tablet: 1 tab(s) orally 2 times a day  mirtazapine 15 mg oral tablet: 1 tab(s) orally once a day (at bedtime)  nystatin 100,000 units/g topical powder: 1 application topically 3 times a day  pantoprazole 40 mg oral delayed release tablet: 1 tab(s) orally 2 times a day  PARoxetine 20 mg oral tablet: 1 tab(s) orally once a day  zinc sulfate 220 mg oral capsule: 1 cap(s) orally once a day collagenase 250 units/g topical ointment: 1 application topically once a day  groin   armpits  ferrous sulfate 325 mg (65 mg elemental iron) oral tablet: 1 tab(s) orally once a day  folic acid 1 mg oral tablet: 1 tab(s) orally once a day  ipratropium-albuterol 0.5 mg-2.5 mg/3 mLinhalation solution: 3 milliliter(s) inhaled every 6 hours  levETIRAcetam 500 mg oral tablet: 1 tab(s) orally 2 times a day  mirtazapine 15 mg oral tablet: 1 tab(s) orally once a day (at bedtime)  nystatin 100,000 units/g topical powder: 1 application topically 3 times a day  oxyCODONE 10 mg oral tablet: 1 tab(s) orally every 6 hours, As needed, Moderate Pain (4 - 6)  pantoprazole 40 mg oral delayed release tablet: 1 tab(s) orally 2 times a day  PARoxetine 20 mg oral tablet: 1 tab(s) orally once a day  zinc sulfate 220 mg oral capsule: 1 cap(s) orally once a day

## 2019-12-20 NOTE — DISCHARGE NOTE PROVIDER - NSDCFUADDAPPT_GEN_ALL_CORE_FT
Please follow up with the physician at the facility. Please follow up with the gastroenterologist in 1-2 weeks.
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
## 2019-12-20 NOTE — DISCHARGE NOTE PROVIDER - NSDCCPCAREPLAN_GEN_ALL_CORE_FT
PRINCIPAL DISCHARGE DIAGNOSIS  Diagnosis: GI bleed  Assessment and Plan of Treatment: You were evaluated for a possible GI bleed. Your endoscopy was negative for any active bleeding. Its positive for acute white based ulcer at the entrance. You were started on Proton pump inhibitor twice a day.      SECONDARY DISCHARGE DIAGNOSES  Diagnosis: History of DVT of lower extremity  Assessment and Plan of Treatment: You have a history of lower extremity for which your repeat bilateral lower extremity ultrasound was negative for DVT. Please STOP eliquis. Due to patients co morbid conditions and quadriplegia, continue mechanical DVT prophylaxis.    Diagnosis: Constipated  Assessment and Plan of Treatment: Continue prune juice with regular diet    Diagnosis: Cramp and spasm  Assessment and Plan of Treatment: Continue tizanidine as prescribed.    Diagnosis: MDD (major depressive disorder)  Assessment and Plan of Treatment: Please continue mirtazapine as prescribed.    Diagnosis: Dermatosis of perineum  Assessment and Plan of Treatment: You were noted to have mucosal injury to the perineum. Below are the instructions for wound care: pack depth with hydrogel impregnated gauze and leave open to air; leave visible tail.    Diagnosis: Pressure ulcer of ischium, stage 3, left  Assessment and Plan of Treatment: You have Left ischium pressure ulcer Stage 3 noted on your physical exam which was present prior to admission. Below are the instructions for wound care: Daily cleanse wound and periwound with Normal Saline, pack depth hydrogel impregnated gauze cover with dry dressing and Tegaderm/Tape; cavilon to periwound.  Discontinue this treatment upon arrival of [facility].    Diagnosis: Pressure ulcer of ischium, stage 3, right  Assessment and Plan of Treatment: You have Right ischium pressure ulcer Stage 3 noted on your physical exam which was present prior to admission. Below are the instructions for wound care: Daily cleanse wound and periwound with Normal Saline, apply nickel-coin thickness of Collagenase (Santyl) pack depth with a gauze, cover with dry dressing and Tegaderm/Tape; Cavilon to periwound.    Diagnosis: Pressure ulcer of ischium  Assessment and Plan of Treatment: You have Lt and Rt ischium pressure ulcer Stage 3 noted on your physical exam which was present prior to admission. Below are the instructions for wound care:    Diagnosis: Pressure ulcer of sacral region  Assessment and Plan of Treatment: You have sacral stage 4 ulcer noted on your physical exam which was present prior to admission. Below are the instructions for wound care: daily cleanse wound and periwound with Normal Saline, apply nickel-coin thickness of Collagenase (Santyl) pack depth with a gauze, cover with dry dressing and Tegaderm/Tape; Cavilon to periwound    Diagnosis: History of paraplegia  Assessment and Plan of Treatment: Patient hx of parplegia. Below are the instructions to promote wound healing.  Clinitron bed. continue to turn & reposition q2h with Z-noe fluidized positioning device, Z-Noe Heel boots to bilateral feet and single breathable pad, please continue measures to decrease friction/shear/pressure. PRINCIPAL DISCHARGE DIAGNOSIS  Diagnosis: GI bleed  Assessment and Plan of Treatment: You were evaluated for a possible GI bleed. Your endoscopy was negative for any active bleeding. Its positive for acute white based ulcer at the entrance of your prior anastomosis site but no active bleeding. You were started on Proton pump inhibitor twice a day. This is to be continued and you should no longer be continued on any type of anticoagulation. You can have bilateral pneumatic boots and also ace wrap intermittently for compression as well as range of motion exercises with physical therapy.      SECONDARY DISCHARGE DIAGNOSES  Diagnosis: History of DVT of lower extremity  Assessment and Plan of Treatment: You have a history of lower extremity for which your repeat bilateral lower extremity ultrasound was negative for DVT. Please STOP eliquis. Recommended by vascular surgery that the patient does not need IVC filter or anticoagulation. Due to patients co morbid conditions and quadriplegia, continue mechanical DVT prophylaxis with pneumatic boots/ intermittent ace wraps. Range of motion exercises.    Diagnosis: Seizure  Assessment and Plan of Treatment: Continue with keppra.    Diagnosis: Cramp and spasm  Assessment and Plan of Treatment: Continue tizanidine as prescribed.    Diagnosis: MDD (major depressive disorder)  Assessment and Plan of Treatment: Please continue mirtazapine as prescribed.    Diagnosis: Dermatosis of perineum  Assessment and Plan of Treatment: You were noted to have mucosal injury to the perineum. Below are the instructions for wound care: pack depth with hydrogel impregnated gauze and leave open to air; leave visible tail.    Diagnosis: Constipated  Assessment and Plan of Treatment: Continue prune juice with regular diet and bowel regimen. No changes from prior regimen.    Diagnosis: Pressure ulcer of ischium, stage 3, left  Assessment and Plan of Treatment: You have Left ischium pressure ulcer Stage 3 noted on your physical exam which was present prior to admission. Below are the instructions for wound care: Daily cleanse wound and periwound with Normal Saline, pack depth hydrogel impregnated gauze cover with dry dressing and Tegaderm/Tape; cavilon to periwound.  Discontinue this treatment upon arrival of [facility].    Diagnosis: Pressure ulcer of ischium, stage 3, right  Assessment and Plan of Treatment: You have Right ischium pressure ulcer Stage 3 noted on your physical exam which was present prior to admission. Below are the instructions for wound care: Daily cleanse wound and periwound with Normal Saline, apply nickel-coin thickness of Collagenase (Santyl) pack depth with a gauze, cover with dry dressing and Tegaderm/Tape; Cavilon to periwound.    Diagnosis: Pressure ulcer of ischium  Assessment and Plan of Treatment: You have Lt and Rt ischium pressure ulcer Stage 3 noted on your physical exam which was present prior to admission. Below are the instructions for wound care:    Diagnosis: Pressure ulcer of sacral region  Assessment and Plan of Treatment: You have sacral stage 4 ulcer noted on your physical exam which was present prior to admission. Below are the instructions for wound care: daily cleanse wound and periwound with Normal Saline, apply nickel-coin thickness of Collagenase (Santyl) pack depth with a gauze, cover with dry dressing and Tegaderm/Tape; Cavilon to periwound    Diagnosis: History of paraplegia  Assessment and Plan of Treatment: Patient hx of parplegia. Below are the instructions to promote wound healing.  Clinitron bed. continue to turn & reposition q2h with Z-noe fluidized positioning device, Z-Noe Heel boots to bilateral feet and single breathable pad, please continue measures to decrease friction/shear/pressure. PRINCIPAL DISCHARGE DIAGNOSIS  Diagnosis: GI bleed  Assessment and Plan of Treatment: You were evaluated for a possible GI bleed. Your endoscopy was negative for any active bleeding. Its positive for acute white based ulcer at the entrance of your prior anastomosis site but no active bleeding. You were started on Proton pump inhibitor twice a day. This is to be continued and you should no longer be continued on any type of anticoagulation. You can have bilateral pneumatic boots and also ace wrap intermittently for compression as well as range of motion exercises with physical therapy.      SECONDARY DISCHARGE DIAGNOSES  Diagnosis: History of DVT of lower extremity  Assessment and Plan of Treatment: You have a history of lower extremity for which your repeat bilateral lower extremity ultrasound was negative for DVT. Please STOP eliquis. Recommended by vascular surgery that the patient does not need IVC filter or anticoagulation. Due to patients co morbid conditions and quadriplegia, continue mechanical DVT prophylaxis with compression stockings/ intermittent ace wraps. Range of motion exercises.    Diagnosis: Constipated  Assessment and Plan of Treatment: Continue prune juice with regular diet and bowel regimen. No changes from prior regimen.    Diagnosis: Cramp and spasm  Assessment and Plan of Treatment: Continue tizanidine as prescribed.    Diagnosis: MDD (major depressive disorder)  Assessment and Plan of Treatment: Please continue mirtazapine as prescribed.    Diagnosis: Dermatosis of perineum  Assessment and Plan of Treatment: You were noted to have mucosal injury to the perineum. Below are the instructions for wound care: pack depth with hydrogel impregnated gauze and leave open to air; leave visible tail.    Diagnosis: Pressure ulcer of ischium, stage 3, left  Assessment and Plan of Treatment: You have Left ischium pressure ulcer Stage 3 noted on your physical exam which was present prior to admission. Below are the instructions for wound care: Daily cleanse wound and periwound with Normal Saline, pack depth hydrogel impregnated gauze cover with dry dressing and Tegaderm/Tape; cavilon to periwound.  Discontinue this treatment upon arrival of [facility].    Diagnosis: Pressure ulcer of ischium, stage 3, right  Assessment and Plan of Treatment: You have Right ischium pressure ulcer Stage 3 noted on your physical exam which was present prior to admission. Below are the instructions for wound care: Daily cleanse wound and periwound with Normal Saline, apply nickel-coin thickness of Collagenase (Santyl) pack depth with a gauze, cover with dry dressing and Tegaderm/Tape; Cavilon to periwound.    Diagnosis: Pressure ulcer of ischium  Assessment and Plan of Treatment: You have Lt and Rt ischium pressure ulcer Stage 3 noted on your physical exam which was present prior to admission. Below are the instructions for wound care:    Diagnosis: Pressure ulcer of sacral region  Assessment and Plan of Treatment: You have sacral stage 4 ulcer noted on your physical exam which was present prior to admission. Below are the instructions for wound care: daily cleanse wound and periwound with Normal Saline, apply nickel-coin thickness of Collagenase (Santyl) pack depth with a gauze, cover with dry dressing and Tegaderm/Tape; Cavilon to periwound    Diagnosis: History of paraplegia  Assessment and Plan of Treatment: Patient hx of parplegia. Below are the instructions to promote wound healing. Continue to turn & reposition q2h with Z-noe fluidized positioning device, Z-Noe Heel boots to bilateral feet and single breathable pad, please continue measures to decrease friction/shear/pressure.    Diagnosis: Seizure  Assessment and Plan of Treatment: Continue with keppra.

## 2019-12-20 NOTE — ADVANCED PRACTICE NURSE CONSULT - ASSESSMENT
Alert & Oriented x 4, bedbound, urethral catheter in place, incontinent of stool.   Skin warm, dry with adequate turgor, scattered areas of hyperpigmentation and hypopigmentation, blanchable erythema to bilateral heels.  The patient has multiple pressure injuries as listed below:  Stage 4 Sacral Pressure Injury measuring 3.7 x 4.0 x 1.5 cm, palpable bone within the wound bed, which is covered with 30% non-viable tissue and 70% agranular tissue (-cobblestone), small amount of sanguinous drainage, epibole from 10-2 o’clock, undermining from 12 to 2 o’clock with the deepest point at 1 o’clock measuring 1.5 cm, no tunneling or odor.  No erythema, warmth of induration noted to periwound.    Stage 3 Pressure Injury to her Left Ischium Tuberosity (LIT) which measures 5.5 x 3.5 x 1.5 cm, the wound bed is covered with 100% shiny, friable, agranular pale pink tissue (-cobblestone), with scant serous drainage, epibole from 10 to 1 o’clock, no undermining tunneling or odor.  No erythema, warmth of induration noted to periwound.    Stage 3 Pressure Injury to her Right Ischium Tuberosity (RIT) which measures 6.5 x 5.5 x 1.2 cm, the wound bed is covered in 50% non-viable grey tissue and 50% agranular pale pink tissue (-cobblestone), with scant serous drainage, no undermining tunneling or odor.  No erythema, warmth of induration noted to periwound.    Mucosal Injury to Perineum which measures 2.0 x 0.7 x 6.3, visible tissue appears pale pink with scant serous drainage.

## 2019-12-20 NOTE — ADVANCED PRACTICE NURSE CONSULT - RECOMMEDATIONS
Recommend nutritional supplement, Franc, for patient.  Patient would benefit from a Clinitron bed  Recommendation for Sacral Pressure Injury: Daily cleanse wound and periwound with Normal Saline, apply nickel-coin thickness of Collagenase (Santyl) pack depth with a gauze, cover with dry dressing and Tegaderm/Tape; Cavilon to periwound.    Goal of Care:  Enzymatic debridement    Recommendation for Left Ischium Tuberosity (LIT) Pressure Injury:   Daily cleanse wound and periwound with Normal Saline, pack depth hydrogel impregnated gauze cover with dry dressing and Tegaderm/Tape; cavilon to periwound.  Discontinue this treatment upon arrival of [facility].    Moistened [facility] to be applied every other day covered with dry dressing and Tegaderm/Tape    Goal of Care:  Promote moist wound healing    Recommendation for Right Ischium Tuberosity (RIT) Pressure Injury:  Daily cleanse wound and periwound with Normal Saline, apply nickel-coin thickness of Collagenase (Santyl) pack depth with a gauze, cover with dry dressing and Tegaderm/Tape; Cavilon to periwound.    Recommendation for perineum:   Surgical intervention needed if possible, in the interim, pack depth with hydrogel impregnated gauze and leave open to air; leave visible tail.    Goal of Care:  Promote moist wound healing    Continue low air loss bed therapy until arrival of Clinitron, continue to turn & reposition q2h with Z-noe fluidized positioning device, Z-Noe Heel boots to bilateral feet and single breathable pad, please continue measures to decrease friction/shear/pressure.     Plan discussed with nurse, Nevin Holt RN and voicemail message left for Dr. Acacia Byrnes.  Please call wound care service line at [phone], if further assistance is needed.

## 2019-12-20 NOTE — CHART NOTE - NSCHARTNOTEFT_GEN_A_CORE
Venous Duplex reviewed with Dr Lazcano  < from: US Duplex Venous Lower Ext Complete, Bilateral (12.20.19 @ 00:46) >    EXAM:  US DPLX LWR EXT VEINS COMPL BI                          *** ADDENDUM 12/20/2019  ***    The calf veins were not seen.    No deep venous thrombosis in the common femoral, femoral or popliteal veins.      *** END OF ADDENDUM 12/20/2019  ***      PROCEDURE DATE:  12/20/2019          INTERPRETATION:  CLINICAL INFORMATION: History of DVT. On Eliquis. Preprocedural for endoscopy.    COMPARISON: None available.    TECHNIQUE: Duplex sonography of the BILATERAL LOWER extremity veins with color and spectral Doppler, with and without compression. The examination was technically limited secondary to difficulty with patient positioning.    FINDINGS:    There is normal compressibility of the bilateral common femoral, femoral and popliteal veins.     Doppler examination shows normal spontaneous and phasic flow.    No calf vein thrombosis is detected.    IMPRESSION:     No evidence of deep venous thrombosis in either lower extremity.    Pt without evidence of DVT   No indication for anticoagulation or IVC filter  Would use mechanical DVT prophylaxis  Vascular will sign off

## 2019-12-21 ENCOUNTER — OUTPATIENT (OUTPATIENT)
Dept: OUTPATIENT SERVICES | Facility: HOSPITAL | Age: 44
LOS: 1 days | End: 2019-12-21

## 2019-12-21 DIAGNOSIS — Z98.84 BARIATRIC SURGERY STATUS: Chronic | ICD-10-CM

## 2019-12-21 DIAGNOSIS — M54.2 CERVICALGIA: Chronic | ICD-10-CM

## 2019-12-23 ENCOUNTER — OUTPATIENT (OUTPATIENT)
Dept: OUTPATIENT SERVICES | Facility: HOSPITAL | Age: 44
LOS: 1 days | End: 2019-12-23

## 2019-12-23 DIAGNOSIS — M54.2 CERVICALGIA: Chronic | ICD-10-CM

## 2019-12-23 DIAGNOSIS — Z98.84 BARIATRIC SURGERY STATUS: Chronic | ICD-10-CM

## 2019-12-23 PROBLEM — Z86.69 PERSONAL HISTORY OF OTHER DISEASES OF THE NERVOUS SYSTEM AND SENSE ORGANS: Chronic | Status: ACTIVE | Noted: 2019-12-19

## 2019-12-23 PROBLEM — I82.409 ACUTE EMBOLISM AND THROMBOSIS OF UNSPECIFIED DEEP VEINS OF UNSPECIFIED LOWER EXTREMITY: Chronic | Status: ACTIVE | Noted: 2019-12-19

## 2019-12-23 PROBLEM — S06.9X9A UNSPECIFIED INTRACRANIAL INJURY WITH LOSS OF CONSCIOUSNESS OF UNSPECIFIED DURATION, INITIAL ENCOUNTER: Chronic | Status: ACTIVE | Noted: 2019-12-19

## 2019-12-31 ENCOUNTER — OUTPATIENT (OUTPATIENT)
Dept: OUTPATIENT SERVICES | Facility: HOSPITAL | Age: 44
LOS: 1 days | End: 2019-12-31

## 2019-12-31 DIAGNOSIS — M54.2 CERVICALGIA: Chronic | ICD-10-CM

## 2019-12-31 DIAGNOSIS — Z98.84 BARIATRIC SURGERY STATUS: Chronic | ICD-10-CM

## 2020-01-03 ENCOUNTER — OUTPATIENT (OUTPATIENT)
Dept: OUTPATIENT SERVICES | Facility: HOSPITAL | Age: 45
LOS: 1 days | End: 2020-01-03

## 2020-01-03 DIAGNOSIS — M54.2 CERVICALGIA: Chronic | ICD-10-CM

## 2020-01-03 DIAGNOSIS — Z98.84 BARIATRIC SURGERY STATUS: Chronic | ICD-10-CM

## 2020-01-07 ENCOUNTER — OUTPATIENT (OUTPATIENT)
Dept: OUTPATIENT SERVICES | Facility: HOSPITAL | Age: 45
LOS: 1 days | End: 2020-01-07

## 2020-01-07 DIAGNOSIS — M54.2 CERVICALGIA: Chronic | ICD-10-CM

## 2020-01-07 DIAGNOSIS — Z98.84 BARIATRIC SURGERY STATUS: Chronic | ICD-10-CM

## 2020-01-08 ENCOUNTER — APPOINTMENT (OUTPATIENT)
Dept: GASTROENTEROLOGY | Facility: CLINIC | Age: 45
End: 2020-01-08

## 2020-01-10 ENCOUNTER — OUTPATIENT (OUTPATIENT)
Dept: OUTPATIENT SERVICES | Facility: HOSPITAL | Age: 45
LOS: 1 days | End: 2020-01-10

## 2020-01-10 DIAGNOSIS — Z98.84 BARIATRIC SURGERY STATUS: Chronic | ICD-10-CM

## 2020-01-10 DIAGNOSIS — M54.2 CERVICALGIA: Chronic | ICD-10-CM

## 2020-01-11 ENCOUNTER — OUTPATIENT (OUTPATIENT)
Dept: OUTPATIENT SERVICES | Facility: HOSPITAL | Age: 45
LOS: 1 days | End: 2020-01-11

## 2020-01-11 DIAGNOSIS — Z98.84 BARIATRIC SURGERY STATUS: Chronic | ICD-10-CM

## 2020-01-11 DIAGNOSIS — M54.2 CERVICALGIA: Chronic | ICD-10-CM

## 2020-01-14 ENCOUNTER — OUTPATIENT (OUTPATIENT)
Dept: OUTPATIENT SERVICES | Facility: HOSPITAL | Age: 45
LOS: 1 days | End: 2020-01-14

## 2020-01-14 DIAGNOSIS — Z98.84 BARIATRIC SURGERY STATUS: Chronic | ICD-10-CM

## 2020-01-14 DIAGNOSIS — M54.2 CERVICALGIA: Chronic | ICD-10-CM

## 2020-01-16 ENCOUNTER — OUTPATIENT (OUTPATIENT)
Dept: OUTPATIENT SERVICES | Facility: HOSPITAL | Age: 45
LOS: 1 days | End: 2020-01-16

## 2020-01-16 DIAGNOSIS — M54.2 CERVICALGIA: Chronic | ICD-10-CM

## 2020-01-16 DIAGNOSIS — Z98.84 BARIATRIC SURGERY STATUS: Chronic | ICD-10-CM

## 2020-01-21 ENCOUNTER — OUTPATIENT (OUTPATIENT)
Dept: OUTPATIENT SERVICES | Facility: HOSPITAL | Age: 45
LOS: 1 days | End: 2020-01-21

## 2020-01-21 DIAGNOSIS — Z98.84 BARIATRIC SURGERY STATUS: Chronic | ICD-10-CM

## 2020-01-21 DIAGNOSIS — M54.2 CERVICALGIA: Chronic | ICD-10-CM

## 2020-02-11 ENCOUNTER — OUTPATIENT (OUTPATIENT)
Dept: OUTPATIENT SERVICES | Facility: HOSPITAL | Age: 45
LOS: 1 days | End: 2020-02-11

## 2020-02-11 DIAGNOSIS — M54.2 CERVICALGIA: Chronic | ICD-10-CM

## 2020-02-11 DIAGNOSIS — Z98.84 BARIATRIC SURGERY STATUS: Chronic | ICD-10-CM

## 2020-02-18 ENCOUNTER — OUTPATIENT (OUTPATIENT)
Dept: OUTPATIENT SERVICES | Facility: HOSPITAL | Age: 45
LOS: 1 days | End: 2020-02-18

## 2020-02-18 DIAGNOSIS — Z98.84 BARIATRIC SURGERY STATUS: Chronic | ICD-10-CM

## 2020-02-18 DIAGNOSIS — M54.2 CERVICALGIA: Chronic | ICD-10-CM

## 2020-02-19 ENCOUNTER — OUTPATIENT (OUTPATIENT)
Dept: OUTPATIENT SERVICES | Facility: HOSPITAL | Age: 45
LOS: 1 days | End: 2020-02-19

## 2020-02-19 DIAGNOSIS — M54.2 CERVICALGIA: Chronic | ICD-10-CM

## 2020-02-19 DIAGNOSIS — Z98.84 BARIATRIC SURGERY STATUS: Chronic | ICD-10-CM

## 2020-03-10 ENCOUNTER — OUTPATIENT (OUTPATIENT)
Dept: OUTPATIENT SERVICES | Facility: HOSPITAL | Age: 45
LOS: 1 days | End: 2020-03-10

## 2020-03-10 DIAGNOSIS — Z98.84 BARIATRIC SURGERY STATUS: Chronic | ICD-10-CM

## 2020-03-10 DIAGNOSIS — M54.2 CERVICALGIA: Chronic | ICD-10-CM

## 2020-03-11 ENCOUNTER — OUTPATIENT (OUTPATIENT)
Dept: OUTPATIENT SERVICES | Facility: HOSPITAL | Age: 45
LOS: 1 days | End: 2020-03-11

## 2020-03-11 DIAGNOSIS — Z98.84 BARIATRIC SURGERY STATUS: Chronic | ICD-10-CM

## 2020-03-11 DIAGNOSIS — M54.2 CERVICALGIA: Chronic | ICD-10-CM

## 2020-03-17 ENCOUNTER — OUTPATIENT (OUTPATIENT)
Dept: OUTPATIENT SERVICES | Facility: HOSPITAL | Age: 45
LOS: 1 days | End: 2020-03-17

## 2020-03-17 DIAGNOSIS — M54.2 CERVICALGIA: Chronic | ICD-10-CM

## 2020-03-17 DIAGNOSIS — Z98.84 BARIATRIC SURGERY STATUS: Chronic | ICD-10-CM

## 2020-03-24 ENCOUNTER — OUTPATIENT (OUTPATIENT)
Dept: OUTPATIENT SERVICES | Facility: HOSPITAL | Age: 45
LOS: 1 days | End: 2020-03-24

## 2020-03-24 DIAGNOSIS — M54.2 CERVICALGIA: Chronic | ICD-10-CM

## 2020-03-24 DIAGNOSIS — Z98.84 BARIATRIC SURGERY STATUS: Chronic | ICD-10-CM

## 2020-03-25 ENCOUNTER — OUTPATIENT (OUTPATIENT)
Dept: OUTPATIENT SERVICES | Facility: HOSPITAL | Age: 45
LOS: 1 days | End: 2020-03-25

## 2020-03-25 DIAGNOSIS — M54.2 CERVICALGIA: Chronic | ICD-10-CM

## 2020-03-25 DIAGNOSIS — Z98.84 BARIATRIC SURGERY STATUS: Chronic | ICD-10-CM

## 2020-03-26 ENCOUNTER — OUTPATIENT (OUTPATIENT)
Dept: OUTPATIENT SERVICES | Facility: HOSPITAL | Age: 45
LOS: 1 days | End: 2020-03-26

## 2020-03-26 DIAGNOSIS — Z98.84 BARIATRIC SURGERY STATUS: Chronic | ICD-10-CM

## 2020-03-26 DIAGNOSIS — M54.2 CERVICALGIA: Chronic | ICD-10-CM

## 2020-03-27 ENCOUNTER — OUTPATIENT (OUTPATIENT)
Dept: OUTPATIENT SERVICES | Facility: HOSPITAL | Age: 45
LOS: 1 days | End: 2020-03-27

## 2020-03-27 DIAGNOSIS — Z98.84 BARIATRIC SURGERY STATUS: Chronic | ICD-10-CM

## 2020-03-27 DIAGNOSIS — M54.2 CERVICALGIA: Chronic | ICD-10-CM

## 2020-03-29 ENCOUNTER — OUTPATIENT (OUTPATIENT)
Dept: OUTPATIENT SERVICES | Facility: HOSPITAL | Age: 45
LOS: 1 days | End: 2020-03-29

## 2020-03-29 DIAGNOSIS — Z98.84 BARIATRIC SURGERY STATUS: Chronic | ICD-10-CM

## 2020-03-29 DIAGNOSIS — M54.2 CERVICALGIA: Chronic | ICD-10-CM

## 2020-03-30 ENCOUNTER — OUTPATIENT (OUTPATIENT)
Dept: OUTPATIENT SERVICES | Facility: HOSPITAL | Age: 45
LOS: 1 days | End: 2020-03-30

## 2020-03-30 DIAGNOSIS — M54.2 CERVICALGIA: Chronic | ICD-10-CM

## 2020-03-30 DIAGNOSIS — Z98.84 BARIATRIC SURGERY STATUS: Chronic | ICD-10-CM

## 2020-04-02 ENCOUNTER — OUTPATIENT (OUTPATIENT)
Dept: OUTPATIENT SERVICES | Facility: HOSPITAL | Age: 45
LOS: 1 days | End: 2020-04-02

## 2020-04-02 DIAGNOSIS — M54.2 CERVICALGIA: Chronic | ICD-10-CM

## 2020-04-02 DIAGNOSIS — Z98.84 BARIATRIC SURGERY STATUS: Chronic | ICD-10-CM

## 2020-04-09 ENCOUNTER — OUTPATIENT (OUTPATIENT)
Dept: OUTPATIENT SERVICES | Facility: HOSPITAL | Age: 45
LOS: 1 days | End: 2020-04-09

## 2020-04-09 DIAGNOSIS — Z98.84 BARIATRIC SURGERY STATUS: Chronic | ICD-10-CM

## 2020-04-09 DIAGNOSIS — M54.2 CERVICALGIA: Chronic | ICD-10-CM

## 2020-04-10 ENCOUNTER — OUTPATIENT (OUTPATIENT)
Dept: OUTPATIENT SERVICES | Facility: HOSPITAL | Age: 45
LOS: 1 days | End: 2020-04-10

## 2020-04-10 DIAGNOSIS — M54.2 CERVICALGIA: Chronic | ICD-10-CM

## 2020-04-10 DIAGNOSIS — Z98.84 BARIATRIC SURGERY STATUS: Chronic | ICD-10-CM

## 2020-04-13 ENCOUNTER — OUTPATIENT (OUTPATIENT)
Dept: OUTPATIENT SERVICES | Facility: HOSPITAL | Age: 45
LOS: 1 days | End: 2020-04-13

## 2020-04-13 DIAGNOSIS — Z98.84 BARIATRIC SURGERY STATUS: Chronic | ICD-10-CM

## 2020-04-13 DIAGNOSIS — M54.2 CERVICALGIA: Chronic | ICD-10-CM

## 2020-04-14 ENCOUNTER — OUTPATIENT (OUTPATIENT)
Dept: OUTPATIENT SERVICES | Facility: HOSPITAL | Age: 45
LOS: 1 days | End: 2020-04-14

## 2020-04-14 DIAGNOSIS — M54.2 CERVICALGIA: Chronic | ICD-10-CM

## 2020-04-14 DIAGNOSIS — Z98.84 BARIATRIC SURGERY STATUS: Chronic | ICD-10-CM

## 2020-04-16 ENCOUNTER — OUTPATIENT (OUTPATIENT)
Dept: OUTPATIENT SERVICES | Facility: HOSPITAL | Age: 45
LOS: 1 days | End: 2020-04-16

## 2020-04-16 DIAGNOSIS — Z98.84 BARIATRIC SURGERY STATUS: Chronic | ICD-10-CM

## 2020-04-16 DIAGNOSIS — M54.2 CERVICALGIA: Chronic | ICD-10-CM

## 2020-04-20 ENCOUNTER — OUTPATIENT (OUTPATIENT)
Dept: OUTPATIENT SERVICES | Facility: HOSPITAL | Age: 45
LOS: 1 days | End: 2020-04-20

## 2020-04-20 DIAGNOSIS — M54.2 CERVICALGIA: Chronic | ICD-10-CM

## 2020-04-20 DIAGNOSIS — Z98.84 BARIATRIC SURGERY STATUS: Chronic | ICD-10-CM

## 2020-04-23 ENCOUNTER — OUTPATIENT (OUTPATIENT)
Dept: OUTPATIENT SERVICES | Facility: HOSPITAL | Age: 45
LOS: 1 days | End: 2020-04-23

## 2020-04-23 DIAGNOSIS — M54.2 CERVICALGIA: Chronic | ICD-10-CM

## 2020-04-23 DIAGNOSIS — Z98.84 BARIATRIC SURGERY STATUS: Chronic | ICD-10-CM

## 2020-04-30 ENCOUNTER — OUTPATIENT (OUTPATIENT)
Dept: OUTPATIENT SERVICES | Facility: HOSPITAL | Age: 45
LOS: 1 days | End: 2020-04-30

## 2020-04-30 DIAGNOSIS — Z98.84 BARIATRIC SURGERY STATUS: Chronic | ICD-10-CM

## 2020-04-30 DIAGNOSIS — M54.2 CERVICALGIA: Chronic | ICD-10-CM

## 2020-05-04 ENCOUNTER — OUTPATIENT (OUTPATIENT)
Dept: OUTPATIENT SERVICES | Facility: HOSPITAL | Age: 45
LOS: 1 days | End: 2020-05-04

## 2020-05-04 DIAGNOSIS — Z98.84 BARIATRIC SURGERY STATUS: Chronic | ICD-10-CM

## 2020-05-04 DIAGNOSIS — M54.2 CERVICALGIA: Chronic | ICD-10-CM

## 2020-05-07 ENCOUNTER — OUTPATIENT (OUTPATIENT)
Dept: OUTPATIENT SERVICES | Facility: HOSPITAL | Age: 45
LOS: 1 days | End: 2020-05-07

## 2020-05-07 DIAGNOSIS — Z98.84 BARIATRIC SURGERY STATUS: Chronic | ICD-10-CM

## 2020-05-07 DIAGNOSIS — M54.2 CERVICALGIA: Chronic | ICD-10-CM

## 2020-05-14 ENCOUNTER — OUTPATIENT (OUTPATIENT)
Dept: OUTPATIENT SERVICES | Facility: HOSPITAL | Age: 45
LOS: 1 days | End: 2020-05-14

## 2020-05-14 DIAGNOSIS — Z98.84 BARIATRIC SURGERY STATUS: Chronic | ICD-10-CM

## 2020-05-14 DIAGNOSIS — M54.2 CERVICALGIA: Chronic | ICD-10-CM

## 2020-05-20 ENCOUNTER — OUTPATIENT (OUTPATIENT)
Dept: OUTPATIENT SERVICES | Facility: HOSPITAL | Age: 45
LOS: 1 days | End: 2020-05-20

## 2020-05-20 DIAGNOSIS — M54.2 CERVICALGIA: Chronic | ICD-10-CM

## 2020-05-20 DIAGNOSIS — Z98.84 BARIATRIC SURGERY STATUS: Chronic | ICD-10-CM

## 2020-05-21 ENCOUNTER — OUTPATIENT (OUTPATIENT)
Dept: OUTPATIENT SERVICES | Facility: HOSPITAL | Age: 45
LOS: 1 days | End: 2020-05-21

## 2020-05-21 DIAGNOSIS — Z98.84 BARIATRIC SURGERY STATUS: Chronic | ICD-10-CM

## 2020-05-21 DIAGNOSIS — M54.2 CERVICALGIA: Chronic | ICD-10-CM

## 2020-05-28 ENCOUNTER — OUTPATIENT (OUTPATIENT)
Dept: OUTPATIENT SERVICES | Facility: HOSPITAL | Age: 45
LOS: 1 days | End: 2020-05-28

## 2020-05-28 DIAGNOSIS — Z98.84 BARIATRIC SURGERY STATUS: Chronic | ICD-10-CM

## 2020-05-28 DIAGNOSIS — M54.2 CERVICALGIA: Chronic | ICD-10-CM

## 2020-06-04 ENCOUNTER — OUTPATIENT (OUTPATIENT)
Dept: OUTPATIENT SERVICES | Facility: HOSPITAL | Age: 45
LOS: 1 days | End: 2020-06-04

## 2020-06-04 DIAGNOSIS — Z98.84 BARIATRIC SURGERY STATUS: Chronic | ICD-10-CM

## 2020-06-04 DIAGNOSIS — M54.2 CERVICALGIA: Chronic | ICD-10-CM

## 2020-06-05 ENCOUNTER — OUTPATIENT (OUTPATIENT)
Dept: OUTPATIENT SERVICES | Facility: HOSPITAL | Age: 45
LOS: 1 days | End: 2020-06-05

## 2020-06-05 DIAGNOSIS — Z98.84 BARIATRIC SURGERY STATUS: Chronic | ICD-10-CM

## 2020-06-05 DIAGNOSIS — M54.2 CERVICALGIA: Chronic | ICD-10-CM

## 2020-06-11 ENCOUNTER — OUTPATIENT (OUTPATIENT)
Dept: OUTPATIENT SERVICES | Facility: HOSPITAL | Age: 45
LOS: 1 days | End: 2020-06-11

## 2020-06-11 DIAGNOSIS — M54.2 CERVICALGIA: Chronic | ICD-10-CM

## 2020-06-11 DIAGNOSIS — Z98.84 BARIATRIC SURGERY STATUS: Chronic | ICD-10-CM

## 2020-06-12 ENCOUNTER — OUTPATIENT (OUTPATIENT)
Dept: OUTPATIENT SERVICES | Facility: HOSPITAL | Age: 45
LOS: 1 days | End: 2020-06-12

## 2020-06-12 DIAGNOSIS — Z98.84 BARIATRIC SURGERY STATUS: Chronic | ICD-10-CM

## 2020-06-12 DIAGNOSIS — M54.2 CERVICALGIA: Chronic | ICD-10-CM

## 2020-06-13 ENCOUNTER — OUTPATIENT (OUTPATIENT)
Dept: OUTPATIENT SERVICES | Facility: HOSPITAL | Age: 45
LOS: 1 days | End: 2020-06-13

## 2020-06-13 DIAGNOSIS — Z98.84 BARIATRIC SURGERY STATUS: Chronic | ICD-10-CM

## 2020-06-13 DIAGNOSIS — M54.2 CERVICALGIA: Chronic | ICD-10-CM

## 2020-06-18 ENCOUNTER — OUTPATIENT (OUTPATIENT)
Dept: OUTPATIENT SERVICES | Facility: HOSPITAL | Age: 45
LOS: 1 days | End: 2020-06-18

## 2020-06-18 DIAGNOSIS — M54.2 CERVICALGIA: Chronic | ICD-10-CM

## 2020-06-18 DIAGNOSIS — Z98.84 BARIATRIC SURGERY STATUS: Chronic | ICD-10-CM

## 2020-06-20 ENCOUNTER — OUTPATIENT (OUTPATIENT)
Dept: OUTPATIENT SERVICES | Facility: HOSPITAL | Age: 45
LOS: 1 days | End: 2020-06-20

## 2020-06-20 DIAGNOSIS — Z98.84 BARIATRIC SURGERY STATUS: Chronic | ICD-10-CM

## 2020-06-20 DIAGNOSIS — M54.2 CERVICALGIA: Chronic | ICD-10-CM

## 2020-06-25 ENCOUNTER — OUTPATIENT (OUTPATIENT)
Dept: OUTPATIENT SERVICES | Facility: HOSPITAL | Age: 45
LOS: 1 days | End: 2020-06-25

## 2020-06-25 DIAGNOSIS — Z98.84 BARIATRIC SURGERY STATUS: Chronic | ICD-10-CM

## 2020-06-25 DIAGNOSIS — M54.2 CERVICALGIA: Chronic | ICD-10-CM

## 2020-07-02 ENCOUNTER — OUTPATIENT (OUTPATIENT)
Dept: OUTPATIENT SERVICES | Facility: HOSPITAL | Age: 45
LOS: 1 days | End: 2020-07-02

## 2020-07-02 DIAGNOSIS — M54.2 CERVICALGIA: Chronic | ICD-10-CM

## 2020-07-02 DIAGNOSIS — Z98.84 BARIATRIC SURGERY STATUS: Chronic | ICD-10-CM

## 2020-07-09 ENCOUNTER — OUTPATIENT (OUTPATIENT)
Dept: OUTPATIENT SERVICES | Facility: HOSPITAL | Age: 45
LOS: 1 days | End: 2020-07-09

## 2020-07-09 DIAGNOSIS — M54.2 CERVICALGIA: Chronic | ICD-10-CM

## 2020-07-09 DIAGNOSIS — Z98.84 BARIATRIC SURGERY STATUS: Chronic | ICD-10-CM

## 2020-07-12 ENCOUNTER — OUTPATIENT (OUTPATIENT)
Dept: OUTPATIENT SERVICES | Facility: HOSPITAL | Age: 45
LOS: 1 days | End: 2020-07-12

## 2020-07-12 DIAGNOSIS — Z98.84 BARIATRIC SURGERY STATUS: Chronic | ICD-10-CM

## 2020-07-12 DIAGNOSIS — M54.2 CERVICALGIA: Chronic | ICD-10-CM

## 2020-07-16 ENCOUNTER — OUTPATIENT (OUTPATIENT)
Dept: OUTPATIENT SERVICES | Facility: HOSPITAL | Age: 45
LOS: 1 days | End: 2020-07-16

## 2020-07-16 DIAGNOSIS — M54.2 CERVICALGIA: Chronic | ICD-10-CM

## 2020-07-16 DIAGNOSIS — Z98.84 BARIATRIC SURGERY STATUS: Chronic | ICD-10-CM

## 2020-07-22 ENCOUNTER — OUTPATIENT (OUTPATIENT)
Dept: OUTPATIENT SERVICES | Facility: HOSPITAL | Age: 45
LOS: 1 days | End: 2020-07-22

## 2020-07-22 DIAGNOSIS — M54.2 CERVICALGIA: Chronic | ICD-10-CM

## 2020-07-22 DIAGNOSIS — Z98.84 BARIATRIC SURGERY STATUS: Chronic | ICD-10-CM

## 2020-07-23 ENCOUNTER — OUTPATIENT (OUTPATIENT)
Dept: OUTPATIENT SERVICES | Facility: HOSPITAL | Age: 45
LOS: 1 days | End: 2020-07-23

## 2020-07-23 DIAGNOSIS — Z98.84 BARIATRIC SURGERY STATUS: Chronic | ICD-10-CM

## 2020-07-23 DIAGNOSIS — M54.2 CERVICALGIA: Chronic | ICD-10-CM

## 2020-07-30 ENCOUNTER — OUTPATIENT (OUTPATIENT)
Dept: OUTPATIENT SERVICES | Facility: HOSPITAL | Age: 45
LOS: 1 days | End: 2020-07-30

## 2020-07-30 DIAGNOSIS — M54.2 CERVICALGIA: Chronic | ICD-10-CM

## 2020-07-30 DIAGNOSIS — Z98.84 BARIATRIC SURGERY STATUS: Chronic | ICD-10-CM

## 2020-08-06 ENCOUNTER — OUTPATIENT (OUTPATIENT)
Dept: OUTPATIENT SERVICES | Facility: HOSPITAL | Age: 45
LOS: 1 days | End: 2020-08-06

## 2020-08-06 DIAGNOSIS — M54.2 CERVICALGIA: Chronic | ICD-10-CM

## 2020-08-06 DIAGNOSIS — Z98.84 BARIATRIC SURGERY STATUS: Chronic | ICD-10-CM

## 2020-08-13 ENCOUNTER — OUTPATIENT (OUTPATIENT)
Dept: OUTPATIENT SERVICES | Facility: HOSPITAL | Age: 45
LOS: 1 days | End: 2020-08-13

## 2020-08-13 DIAGNOSIS — M54.2 CERVICALGIA: Chronic | ICD-10-CM

## 2020-08-13 DIAGNOSIS — Z98.84 BARIATRIC SURGERY STATUS: Chronic | ICD-10-CM

## 2020-08-17 ENCOUNTER — OUTPATIENT (OUTPATIENT)
Dept: OUTPATIENT SERVICES | Facility: HOSPITAL | Age: 45
LOS: 1 days | End: 2020-08-17

## 2020-08-17 DIAGNOSIS — M54.2 CERVICALGIA: Chronic | ICD-10-CM

## 2020-08-17 DIAGNOSIS — Z98.84 BARIATRIC SURGERY STATUS: Chronic | ICD-10-CM

## 2020-08-20 ENCOUNTER — OUTPATIENT (OUTPATIENT)
Dept: OUTPATIENT SERVICES | Facility: HOSPITAL | Age: 45
LOS: 1 days | End: 2020-08-20

## 2020-08-20 DIAGNOSIS — Z98.84 BARIATRIC SURGERY STATUS: Chronic | ICD-10-CM

## 2020-08-20 DIAGNOSIS — M54.2 CERVICALGIA: Chronic | ICD-10-CM

## 2020-08-22 ENCOUNTER — OUTPATIENT (OUTPATIENT)
Dept: OUTPATIENT SERVICES | Facility: HOSPITAL | Age: 45
LOS: 1 days | End: 2020-08-22

## 2020-08-22 DIAGNOSIS — Z98.84 BARIATRIC SURGERY STATUS: Chronic | ICD-10-CM

## 2020-08-22 DIAGNOSIS — M54.2 CERVICALGIA: Chronic | ICD-10-CM

## 2020-08-27 ENCOUNTER — OUTPATIENT (OUTPATIENT)
Dept: OUTPATIENT SERVICES | Facility: HOSPITAL | Age: 45
LOS: 1 days | End: 2020-08-27

## 2020-08-27 DIAGNOSIS — M54.2 CERVICALGIA: Chronic | ICD-10-CM

## 2020-08-27 DIAGNOSIS — Z98.84 BARIATRIC SURGERY STATUS: Chronic | ICD-10-CM

## 2020-08-29 ENCOUNTER — OUTPATIENT (OUTPATIENT)
Dept: OUTPATIENT SERVICES | Facility: HOSPITAL | Age: 45
LOS: 1 days | End: 2020-08-29

## 2020-08-29 DIAGNOSIS — Z98.84 BARIATRIC SURGERY STATUS: Chronic | ICD-10-CM

## 2020-08-29 DIAGNOSIS — M54.2 CERVICALGIA: Chronic | ICD-10-CM

## 2020-09-01 ENCOUNTER — OUTPATIENT (OUTPATIENT)
Dept: OUTPATIENT SERVICES | Facility: HOSPITAL | Age: 45
LOS: 1 days | End: 2020-09-01

## 2020-09-01 DIAGNOSIS — M54.2 CERVICALGIA: Chronic | ICD-10-CM

## 2020-09-01 DIAGNOSIS — Z98.84 BARIATRIC SURGERY STATUS: Chronic | ICD-10-CM

## 2020-09-02 ENCOUNTER — OUTPATIENT (OUTPATIENT)
Dept: OUTPATIENT SERVICES | Facility: HOSPITAL | Age: 45
LOS: 1 days | End: 2020-09-02

## 2020-09-02 DIAGNOSIS — Z98.84 BARIATRIC SURGERY STATUS: Chronic | ICD-10-CM

## 2020-09-02 DIAGNOSIS — M54.2 CERVICALGIA: Chronic | ICD-10-CM

## 2020-09-03 ENCOUNTER — OUTPATIENT (OUTPATIENT)
Dept: OUTPATIENT SERVICES | Facility: HOSPITAL | Age: 45
LOS: 1 days | End: 2020-09-03

## 2020-09-03 DIAGNOSIS — Z98.84 BARIATRIC SURGERY STATUS: Chronic | ICD-10-CM

## 2020-09-03 DIAGNOSIS — M54.2 CERVICALGIA: Chronic | ICD-10-CM

## 2020-09-05 ENCOUNTER — OUTPATIENT (OUTPATIENT)
Dept: OUTPATIENT SERVICES | Facility: HOSPITAL | Age: 45
LOS: 1 days | End: 2020-09-05

## 2020-09-05 DIAGNOSIS — M54.2 CERVICALGIA: Chronic | ICD-10-CM

## 2020-09-05 DIAGNOSIS — Z98.84 BARIATRIC SURGERY STATUS: Chronic | ICD-10-CM

## 2020-09-10 ENCOUNTER — OUTPATIENT (OUTPATIENT)
Dept: OUTPATIENT SERVICES | Facility: HOSPITAL | Age: 45
LOS: 1 days | End: 2020-09-10

## 2020-09-10 DIAGNOSIS — Z98.84 BARIATRIC SURGERY STATUS: Chronic | ICD-10-CM

## 2020-09-10 DIAGNOSIS — M54.2 CERVICALGIA: Chronic | ICD-10-CM

## 2020-09-11 ENCOUNTER — APPOINTMENT (OUTPATIENT)
Dept: OBGYN | Facility: CLINIC | Age: 45
End: 2020-09-11
Payer: MEDICAID

## 2020-09-11 VITALS
DIASTOLIC BLOOD PRESSURE: 80 MMHG | HEIGHT: 67 IN | WEIGHT: 139 LBS | BODY MASS INDEX: 21.82 KG/M2 | SYSTOLIC BLOOD PRESSURE: 122 MMHG

## 2020-09-11 DIAGNOSIS — Z87.39 PERSONAL HISTORY OF OTHER DISEASES OF THE MUSCULOSKELETAL SYSTEM AND CONNECTIVE TISSUE: ICD-10-CM

## 2020-09-11 DIAGNOSIS — Z78.9 OTHER SPECIFIED HEALTH STATUS: ICD-10-CM

## 2020-09-11 DIAGNOSIS — Z83.3 FAMILY HISTORY OF DIABETES MELLITUS: ICD-10-CM

## 2020-09-11 DIAGNOSIS — N31.2 FLACCID NEUROPATHIC BLADDER, NOT ELSEWHERE CLASSIFIED: ICD-10-CM

## 2020-09-11 DIAGNOSIS — F41.9 ANXIETY DISORDER, UNSPECIFIED: ICD-10-CM

## 2020-09-11 DIAGNOSIS — Z01.419 ENCOUNTER FOR GYNECOLOGICAL EXAMINATION (GENERAL) (ROUTINE) W/OUT ABNORMAL FINDINGS: ICD-10-CM

## 2020-09-11 DIAGNOSIS — Z82.3 FAMILY HISTORY OF STROKE: ICD-10-CM

## 2020-09-11 DIAGNOSIS — Z86.69 PERSONAL HISTORY OF OTHER DISEASES OF THE NERVOUS SYSTEM AND SENSE ORGANS: ICD-10-CM

## 2020-09-11 DIAGNOSIS — Z82.0 FAMILY HISTORY OF EPILEPSY AND OTHER DISEASES OF THE NERVOUS SYSTEM: ICD-10-CM

## 2020-09-11 DIAGNOSIS — F32.9 ANXIETY DISORDER, UNSPECIFIED: ICD-10-CM

## 2020-09-11 DIAGNOSIS — N95.1 MENOPAUSAL AND FEMALE CLIMACTERIC STATES: ICD-10-CM

## 2020-09-11 DIAGNOSIS — Z86.79 PERSONAL HISTORY OF OTHER DISEASES OF THE CIRCULATORY SYSTEM: ICD-10-CM

## 2020-09-11 DIAGNOSIS — R23.2 FLUSHING: ICD-10-CM

## 2020-09-11 PROCEDURE — 99386 PREV VISIT NEW AGE 40-64: CPT

## 2020-09-11 RX ORDER — ACETAMINOPHEN 500 MG/1
TABLET ORAL
Refills: 0 | Status: ACTIVE | COMMUNITY

## 2020-09-11 RX ORDER — FERROUS SULFATE 325(65) MG
TABLET ORAL
Refills: 0 | Status: ACTIVE | COMMUNITY

## 2020-09-11 RX ORDER — LEVETIRACETAM 100 MG/ML
SOLUTION ORAL
Refills: 0 | Status: ACTIVE | COMMUNITY

## 2020-09-11 RX ORDER — METHOCARBAMOL 750 MG/1
TABLET, FILM COATED ORAL
Refills: 0 | Status: ACTIVE | COMMUNITY

## 2020-09-11 RX ORDER — MIRTAZAPINE 7.5 MG/1
TABLET, FILM COATED ORAL
Refills: 0 | Status: ACTIVE | COMMUNITY

## 2020-09-11 RX ORDER — BACLOFEN 15 MG/1
TABLET ORAL
Refills: 0 | Status: ACTIVE | COMMUNITY

## 2020-09-11 RX ORDER — WARFARIN SODIUM 1 MG/1
1 TABLET ORAL
Refills: 0 | Status: ACTIVE | COMMUNITY

## 2020-09-11 RX ORDER — TIZANIDINE HYDROCHLORIDE 6 MG/1
CAPSULE ORAL
Refills: 0 | Status: ACTIVE | COMMUNITY

## 2020-09-11 NOTE — CHIEF COMPLAINT
[Initial Visit] : initial GYN visit [FreeTextEntry1] : Pt is here for visit regarding menopausal hot flashes.\par Pt reports she was going to [facility] for pregnancy, 1993, male.\par Pt states she hasn't had annual, pap since she can remember, no mammograms.
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
## 2020-09-11 NOTE — PHYSICAL EXAM
[Awake] : awake [Alert] : alert [Soft] : soft [Oriented x3] : oriented to person, place, and time [Labia Majora] : labia major [Labia Minora] : labia minora [Normal] : clitoris [Scant] : there was scant vaginal bleeding [RRR, No Murmurs] : RRR, no murmurs [Normal Rate] : the respiratory rate was normal [Wet Cough] : a wet cough was heard [Wheezing Bilaterally] : no wheezing was heard [Acute Distress] : no acute distress [Mass] : no breast mass [Nipple Discharge] : no nipple discharge [Axillary LAD] : no axillary lymphadenopathy [Tender] : non tender [H/Smegaly] : no hepatosplenomegaly [Distended] : not distended [Depressed Mood] : not depressed [Flat Affect] : affect not flat [de-identified] : urinary catheter in place,  [Distress] : no respiratory distress [FreeTextEntry4] : unable to perform speculum exam d/t pt immobility, pap performed with blind sweep of broom

## 2020-09-14 LAB — HPV HIGH+LOW RISK DNA PNL CVX: NOT DETECTED

## 2020-09-17 ENCOUNTER — OUTPATIENT (OUTPATIENT)
Dept: OUTPATIENT SERVICES | Facility: HOSPITAL | Age: 45
LOS: 1 days | End: 2020-09-17

## 2020-09-17 DIAGNOSIS — M54.2 CERVICALGIA: Chronic | ICD-10-CM

## 2020-09-17 DIAGNOSIS — Z98.84 BARIATRIC SURGERY STATUS: Chronic | ICD-10-CM

## 2020-09-17 LAB — CYTOLOGY CVX/VAG DOC THIN PREP: NORMAL

## 2020-09-24 ENCOUNTER — OUTPATIENT (OUTPATIENT)
Dept: OUTPATIENT SERVICES | Facility: HOSPITAL | Age: 45
LOS: 1 days | End: 2020-09-24

## 2020-09-24 DIAGNOSIS — M54.2 CERVICALGIA: Chronic | ICD-10-CM

## 2020-09-24 DIAGNOSIS — Z98.84 BARIATRIC SURGERY STATUS: Chronic | ICD-10-CM

## 2020-10-01 ENCOUNTER — OUTPATIENT (OUTPATIENT)
Dept: OUTPATIENT SERVICES | Facility: HOSPITAL | Age: 45
LOS: 1 days | End: 2020-10-01

## 2020-10-01 DIAGNOSIS — Z98.84 BARIATRIC SURGERY STATUS: Chronic | ICD-10-CM

## 2020-10-01 DIAGNOSIS — M54.2 CERVICALGIA: Chronic | ICD-10-CM

## 2020-10-08 ENCOUNTER — OUTPATIENT (OUTPATIENT)
Dept: OUTPATIENT SERVICES | Facility: HOSPITAL | Age: 45
LOS: 1 days | End: 2020-10-08

## 2020-10-08 DIAGNOSIS — Z98.84 BARIATRIC SURGERY STATUS: Chronic | ICD-10-CM

## 2020-10-08 DIAGNOSIS — M54.2 CERVICALGIA: Chronic | ICD-10-CM

## 2020-10-15 ENCOUNTER — OUTPATIENT (OUTPATIENT)
Dept: OUTPATIENT SERVICES | Facility: HOSPITAL | Age: 45
LOS: 1 days | End: 2020-10-15

## 2020-10-15 DIAGNOSIS — Z98.84 BARIATRIC SURGERY STATUS: Chronic | ICD-10-CM

## 2020-10-15 DIAGNOSIS — M54.2 CERVICALGIA: Chronic | ICD-10-CM

## 2020-10-22 ENCOUNTER — OUTPATIENT (OUTPATIENT)
Dept: OUTPATIENT SERVICES | Facility: HOSPITAL | Age: 45
LOS: 1 days | End: 2020-10-22

## 2020-10-22 DIAGNOSIS — Z98.84 BARIATRIC SURGERY STATUS: Chronic | ICD-10-CM

## 2020-10-22 DIAGNOSIS — M54.2 CERVICALGIA: Chronic | ICD-10-CM

## 2020-10-28 ENCOUNTER — OUTPATIENT (OUTPATIENT)
Dept: OUTPATIENT SERVICES | Facility: HOSPITAL | Age: 45
LOS: 1 days | End: 2020-10-28

## 2020-10-28 DIAGNOSIS — Z98.84 BARIATRIC SURGERY STATUS: Chronic | ICD-10-CM

## 2020-10-28 DIAGNOSIS — M54.2 CERVICALGIA: Chronic | ICD-10-CM

## 2020-10-29 ENCOUNTER — OUTPATIENT (OUTPATIENT)
Dept: OUTPATIENT SERVICES | Facility: HOSPITAL | Age: 45
LOS: 1 days | End: 2020-10-29

## 2020-10-29 DIAGNOSIS — M54.2 CERVICALGIA: Chronic | ICD-10-CM

## 2020-10-29 DIAGNOSIS — Z98.84 BARIATRIC SURGERY STATUS: Chronic | ICD-10-CM

## 2020-10-31 ENCOUNTER — OUTPATIENT (OUTPATIENT)
Dept: OUTPATIENT SERVICES | Facility: HOSPITAL | Age: 45
LOS: 1 days | End: 2020-10-31

## 2020-10-31 DIAGNOSIS — Z98.84 BARIATRIC SURGERY STATUS: Chronic | ICD-10-CM

## 2020-10-31 DIAGNOSIS — M54.2 CERVICALGIA: Chronic | ICD-10-CM

## 2020-11-02 ENCOUNTER — OUTPATIENT (OUTPATIENT)
Dept: OUTPATIENT SERVICES | Facility: HOSPITAL | Age: 45
LOS: 1 days | End: 2020-11-02

## 2020-11-02 DIAGNOSIS — M54.2 CERVICALGIA: Chronic | ICD-10-CM

## 2020-11-02 DIAGNOSIS — Z98.84 BARIATRIC SURGERY STATUS: Chronic | ICD-10-CM

## 2020-11-05 ENCOUNTER — OUTPATIENT (OUTPATIENT)
Dept: OUTPATIENT SERVICES | Facility: HOSPITAL | Age: 45
LOS: 1 days | End: 2020-11-05

## 2020-11-05 DIAGNOSIS — M54.2 CERVICALGIA: Chronic | ICD-10-CM

## 2020-11-05 DIAGNOSIS — Z98.84 BARIATRIC SURGERY STATUS: Chronic | ICD-10-CM

## 2020-11-12 ENCOUNTER — OUTPATIENT (OUTPATIENT)
Dept: OUTPATIENT SERVICES | Facility: HOSPITAL | Age: 45
LOS: 1 days | End: 2020-11-12

## 2020-11-12 DIAGNOSIS — Z98.84 BARIATRIC SURGERY STATUS: Chronic | ICD-10-CM

## 2020-11-12 DIAGNOSIS — M54.2 CERVICALGIA: Chronic | ICD-10-CM

## 2020-11-16 ENCOUNTER — OUTPATIENT (OUTPATIENT)
Dept: OUTPATIENT SERVICES | Facility: HOSPITAL | Age: 45
LOS: 1 days | End: 2020-11-16

## 2020-11-16 DIAGNOSIS — M54.2 CERVICALGIA: Chronic | ICD-10-CM

## 2020-11-16 DIAGNOSIS — Z98.84 BARIATRIC SURGERY STATUS: Chronic | ICD-10-CM

## 2020-11-19 ENCOUNTER — OUTPATIENT (OUTPATIENT)
Dept: OUTPATIENT SERVICES | Facility: HOSPITAL | Age: 45
LOS: 1 days | End: 2020-11-19

## 2020-11-19 DIAGNOSIS — Z98.84 BARIATRIC SURGERY STATUS: Chronic | ICD-10-CM

## 2020-11-19 DIAGNOSIS — M54.2 CERVICALGIA: Chronic | ICD-10-CM

## 2020-11-20 ENCOUNTER — OUTPATIENT (OUTPATIENT)
Dept: OUTPATIENT SERVICES | Facility: HOSPITAL | Age: 45
LOS: 1 days | End: 2020-11-20

## 2020-11-20 DIAGNOSIS — Z98.84 BARIATRIC SURGERY STATUS: Chronic | ICD-10-CM

## 2020-11-20 DIAGNOSIS — M54.2 CERVICALGIA: Chronic | ICD-10-CM

## 2020-11-21 ENCOUNTER — OUTPATIENT (OUTPATIENT)
Dept: OUTPATIENT SERVICES | Facility: HOSPITAL | Age: 45
LOS: 1 days | End: 2020-11-21

## 2020-11-21 DIAGNOSIS — M54.2 CERVICALGIA: Chronic | ICD-10-CM

## 2020-11-21 DIAGNOSIS — Z98.84 BARIATRIC SURGERY STATUS: Chronic | ICD-10-CM

## 2020-11-23 ENCOUNTER — OUTPATIENT (OUTPATIENT)
Dept: OUTPATIENT SERVICES | Facility: HOSPITAL | Age: 45
LOS: 1 days | End: 2020-11-23

## 2020-11-23 DIAGNOSIS — Z98.84 BARIATRIC SURGERY STATUS: Chronic | ICD-10-CM

## 2020-11-23 DIAGNOSIS — M54.2 CERVICALGIA: Chronic | ICD-10-CM

## 2020-11-24 ENCOUNTER — OUTPATIENT (OUTPATIENT)
Dept: OUTPATIENT SERVICES | Facility: HOSPITAL | Age: 45
LOS: 1 days | End: 2020-11-24

## 2020-11-24 DIAGNOSIS — M54.2 CERVICALGIA: Chronic | ICD-10-CM

## 2020-11-24 DIAGNOSIS — Z98.84 BARIATRIC SURGERY STATUS: Chronic | ICD-10-CM

## 2020-11-25 ENCOUNTER — OUTPATIENT (OUTPATIENT)
Dept: OUTPATIENT SERVICES | Facility: HOSPITAL | Age: 45
LOS: 1 days | End: 2020-11-25

## 2020-11-25 DIAGNOSIS — Z98.84 BARIATRIC SURGERY STATUS: Chronic | ICD-10-CM

## 2020-11-25 DIAGNOSIS — M54.2 CERVICALGIA: Chronic | ICD-10-CM

## 2020-11-27 ENCOUNTER — OUTPATIENT (OUTPATIENT)
Dept: OUTPATIENT SERVICES | Facility: HOSPITAL | Age: 45
LOS: 1 days | End: 2020-11-27

## 2020-11-27 DIAGNOSIS — Z98.84 BARIATRIC SURGERY STATUS: Chronic | ICD-10-CM

## 2020-11-27 DIAGNOSIS — M54.2 CERVICALGIA: Chronic | ICD-10-CM

## 2020-11-30 ENCOUNTER — OUTPATIENT (OUTPATIENT)
Dept: OUTPATIENT SERVICES | Facility: HOSPITAL | Age: 45
LOS: 1 days | End: 2020-11-30

## 2020-11-30 DIAGNOSIS — M54.2 CERVICALGIA: Chronic | ICD-10-CM

## 2020-11-30 DIAGNOSIS — Z98.84 BARIATRIC SURGERY STATUS: Chronic | ICD-10-CM

## 2020-12-02 ENCOUNTER — OUTPATIENT (OUTPATIENT)
Dept: OUTPATIENT SERVICES | Facility: HOSPITAL | Age: 45
LOS: 1 days | End: 2020-12-02

## 2020-12-02 DIAGNOSIS — Z98.84 BARIATRIC SURGERY STATUS: Chronic | ICD-10-CM

## 2020-12-02 DIAGNOSIS — M54.2 CERVICALGIA: Chronic | ICD-10-CM

## 2020-12-04 ENCOUNTER — OUTPATIENT (OUTPATIENT)
Dept: OUTPATIENT SERVICES | Facility: HOSPITAL | Age: 45
LOS: 1 days | End: 2020-12-04

## 2020-12-04 DIAGNOSIS — Z98.84 BARIATRIC SURGERY STATUS: Chronic | ICD-10-CM

## 2020-12-04 DIAGNOSIS — M54.2 CERVICALGIA: Chronic | ICD-10-CM

## 2020-12-07 ENCOUNTER — OUTPATIENT (OUTPATIENT)
Dept: OUTPATIENT SERVICES | Facility: HOSPITAL | Age: 45
LOS: 1 days | End: 2020-12-07

## 2020-12-07 DIAGNOSIS — M54.2 CERVICALGIA: Chronic | ICD-10-CM

## 2020-12-07 DIAGNOSIS — Z98.84 BARIATRIC SURGERY STATUS: Chronic | ICD-10-CM

## 2020-12-14 ENCOUNTER — OUTPATIENT (OUTPATIENT)
Dept: OUTPATIENT SERVICES | Facility: HOSPITAL | Age: 45
LOS: 1 days | End: 2020-12-14

## 2020-12-14 DIAGNOSIS — Z98.84 BARIATRIC SURGERY STATUS: Chronic | ICD-10-CM

## 2020-12-14 DIAGNOSIS — M54.2 CERVICALGIA: Chronic | ICD-10-CM

## 2020-12-17 ENCOUNTER — OUTPATIENT (OUTPATIENT)
Dept: OUTPATIENT SERVICES | Facility: HOSPITAL | Age: 45
LOS: 1 days | End: 2020-12-17

## 2020-12-17 DIAGNOSIS — Z98.84 BARIATRIC SURGERY STATUS: Chronic | ICD-10-CM

## 2020-12-17 DIAGNOSIS — M54.2 CERVICALGIA: Chronic | ICD-10-CM

## 2020-12-21 ENCOUNTER — OUTPATIENT (OUTPATIENT)
Dept: OUTPATIENT SERVICES | Facility: HOSPITAL | Age: 45
LOS: 1 days | End: 2020-12-21

## 2020-12-21 DIAGNOSIS — Z98.84 BARIATRIC SURGERY STATUS: Chronic | ICD-10-CM

## 2020-12-21 DIAGNOSIS — M54.2 CERVICALGIA: Chronic | ICD-10-CM

## 2020-12-23 PROBLEM — Z01.419 ENCOUNTER FOR ROUTINE GYNECOLOGICAL EXAMINATION: Status: RESOLVED | Noted: 2020-09-11 | Resolved: 2020-12-23

## 2020-12-24 ENCOUNTER — OUTPATIENT (OUTPATIENT)
Dept: OUTPATIENT SERVICES | Facility: HOSPITAL | Age: 45
LOS: 1 days | End: 2020-12-24

## 2020-12-24 DIAGNOSIS — Z98.84 BARIATRIC SURGERY STATUS: Chronic | ICD-10-CM

## 2020-12-24 DIAGNOSIS — M54.2 CERVICALGIA: Chronic | ICD-10-CM

## 2020-12-28 ENCOUNTER — OUTPATIENT (OUTPATIENT)
Dept: OUTPATIENT SERVICES | Facility: HOSPITAL | Age: 45
LOS: 1 days | End: 2020-12-28

## 2020-12-28 DIAGNOSIS — Z98.84 BARIATRIC SURGERY STATUS: Chronic | ICD-10-CM

## 2020-12-28 DIAGNOSIS — M54.2 CERVICALGIA: Chronic | ICD-10-CM

## 2020-12-31 ENCOUNTER — OUTPATIENT (OUTPATIENT)
Dept: OUTPATIENT SERVICES | Facility: HOSPITAL | Age: 45
LOS: 1 days | End: 2020-12-31

## 2020-12-31 DIAGNOSIS — Z98.84 BARIATRIC SURGERY STATUS: Chronic | ICD-10-CM

## 2020-12-31 DIAGNOSIS — M54.2 CERVICALGIA: Chronic | ICD-10-CM

## 2021-01-04 ENCOUNTER — OUTPATIENT (OUTPATIENT)
Dept: OUTPATIENT SERVICES | Facility: HOSPITAL | Age: 46
LOS: 1 days | End: 2021-01-04

## 2021-01-04 DIAGNOSIS — M54.2 CERVICALGIA: Chronic | ICD-10-CM

## 2021-01-04 DIAGNOSIS — Z98.84 BARIATRIC SURGERY STATUS: Chronic | ICD-10-CM

## 2021-01-07 ENCOUNTER — OUTPATIENT (OUTPATIENT)
Dept: OUTPATIENT SERVICES | Facility: HOSPITAL | Age: 46
LOS: 1 days | End: 2021-01-07

## 2021-01-07 DIAGNOSIS — M54.2 CERVICALGIA: Chronic | ICD-10-CM

## 2021-01-07 DIAGNOSIS — Z98.84 BARIATRIC SURGERY STATUS: Chronic | ICD-10-CM

## 2021-01-11 ENCOUNTER — OUTPATIENT (OUTPATIENT)
Dept: OUTPATIENT SERVICES | Facility: HOSPITAL | Age: 46
LOS: 1 days | End: 2021-01-11

## 2021-01-11 DIAGNOSIS — Z98.84 BARIATRIC SURGERY STATUS: Chronic | ICD-10-CM

## 2021-01-11 DIAGNOSIS — M54.2 CERVICALGIA: Chronic | ICD-10-CM

## 2021-01-14 ENCOUNTER — OUTPATIENT (OUTPATIENT)
Dept: OUTPATIENT SERVICES | Facility: HOSPITAL | Age: 46
LOS: 1 days | End: 2021-01-14

## 2021-01-14 DIAGNOSIS — M54.2 CERVICALGIA: Chronic | ICD-10-CM

## 2021-01-14 DIAGNOSIS — Z98.84 BARIATRIC SURGERY STATUS: Chronic | ICD-10-CM

## 2021-01-18 ENCOUNTER — OUTPATIENT (OUTPATIENT)
Dept: OUTPATIENT SERVICES | Facility: HOSPITAL | Age: 46
LOS: 1 days | End: 2021-01-18

## 2021-01-18 DIAGNOSIS — Z98.84 BARIATRIC SURGERY STATUS: Chronic | ICD-10-CM

## 2021-01-18 DIAGNOSIS — M54.2 CERVICALGIA: Chronic | ICD-10-CM

## 2021-01-21 ENCOUNTER — OUTPATIENT (OUTPATIENT)
Dept: OUTPATIENT SERVICES | Facility: HOSPITAL | Age: 46
LOS: 1 days | End: 2021-01-21

## 2021-01-21 DIAGNOSIS — Z98.84 BARIATRIC SURGERY STATUS: Chronic | ICD-10-CM

## 2021-01-21 DIAGNOSIS — M54.2 CERVICALGIA: Chronic | ICD-10-CM

## 2021-01-25 ENCOUNTER — OUTPATIENT (OUTPATIENT)
Dept: OUTPATIENT SERVICES | Facility: HOSPITAL | Age: 46
LOS: 1 days | End: 2021-01-25

## 2021-01-25 DIAGNOSIS — Z98.84 BARIATRIC SURGERY STATUS: Chronic | ICD-10-CM

## 2021-01-25 DIAGNOSIS — M54.2 CERVICALGIA: Chronic | ICD-10-CM

## 2021-01-28 ENCOUNTER — OUTPATIENT (OUTPATIENT)
Dept: OUTPATIENT SERVICES | Facility: HOSPITAL | Age: 46
LOS: 1 days | End: 2021-01-28

## 2021-01-28 DIAGNOSIS — M54.2 CERVICALGIA: Chronic | ICD-10-CM

## 2021-01-28 DIAGNOSIS — Z98.84 BARIATRIC SURGERY STATUS: Chronic | ICD-10-CM

## 2021-01-31 ENCOUNTER — OUTPATIENT (OUTPATIENT)
Dept: OUTPATIENT SERVICES | Facility: HOSPITAL | Age: 46
LOS: 1 days | End: 2021-01-31

## 2021-01-31 DIAGNOSIS — Z98.84 BARIATRIC SURGERY STATUS: Chronic | ICD-10-CM

## 2021-01-31 DIAGNOSIS — M54.2 CERVICALGIA: Chronic | ICD-10-CM

## 2021-02-04 ENCOUNTER — OUTPATIENT (OUTPATIENT)
Dept: OUTPATIENT SERVICES | Facility: HOSPITAL | Age: 46
LOS: 1 days | End: 2021-02-04

## 2021-02-04 DIAGNOSIS — M54.2 CERVICALGIA: Chronic | ICD-10-CM

## 2021-02-04 DIAGNOSIS — Z98.84 BARIATRIC SURGERY STATUS: Chronic | ICD-10-CM

## 2021-02-07 ENCOUNTER — OUTPATIENT (OUTPATIENT)
Dept: OUTPATIENT SERVICES | Facility: HOSPITAL | Age: 46
LOS: 1 days | End: 2021-02-07

## 2021-02-07 DIAGNOSIS — Z98.84 BARIATRIC SURGERY STATUS: Chronic | ICD-10-CM

## 2021-02-07 DIAGNOSIS — M54.2 CERVICALGIA: Chronic | ICD-10-CM

## 2021-02-11 ENCOUNTER — OUTPATIENT (OUTPATIENT)
Dept: OUTPATIENT SERVICES | Facility: HOSPITAL | Age: 46
LOS: 1 days | End: 2021-02-11

## 2021-02-11 DIAGNOSIS — Z98.84 BARIATRIC SURGERY STATUS: Chronic | ICD-10-CM

## 2021-02-11 DIAGNOSIS — M54.2 CERVICALGIA: Chronic | ICD-10-CM

## 2021-02-15 ENCOUNTER — OUTPATIENT (OUTPATIENT)
Dept: OUTPATIENT SERVICES | Facility: HOSPITAL | Age: 46
LOS: 1 days | End: 2021-02-15

## 2021-02-15 DIAGNOSIS — Z98.84 BARIATRIC SURGERY STATUS: Chronic | ICD-10-CM

## 2021-02-15 DIAGNOSIS — M54.2 CERVICALGIA: Chronic | ICD-10-CM

## 2021-02-18 ENCOUNTER — OUTPATIENT (OUTPATIENT)
Dept: OUTPATIENT SERVICES | Facility: HOSPITAL | Age: 46
LOS: 1 days | End: 2021-02-18

## 2021-02-18 DIAGNOSIS — M54.2 CERVICALGIA: Chronic | ICD-10-CM

## 2021-02-18 DIAGNOSIS — Z98.84 BARIATRIC SURGERY STATUS: Chronic | ICD-10-CM

## 2021-02-21 ENCOUNTER — OUTPATIENT (OUTPATIENT)
Dept: OUTPATIENT SERVICES | Facility: HOSPITAL | Age: 46
LOS: 1 days | End: 2021-02-21

## 2021-02-21 DIAGNOSIS — Z98.84 BARIATRIC SURGERY STATUS: Chronic | ICD-10-CM

## 2021-02-21 DIAGNOSIS — M54.2 CERVICALGIA: Chronic | ICD-10-CM

## 2021-02-25 ENCOUNTER — OUTPATIENT (OUTPATIENT)
Dept: OUTPATIENT SERVICES | Facility: HOSPITAL | Age: 46
LOS: 1 days | End: 2021-02-25

## 2021-02-25 DIAGNOSIS — Z98.84 BARIATRIC SURGERY STATUS: Chronic | ICD-10-CM

## 2021-02-25 DIAGNOSIS — M54.2 CERVICALGIA: Chronic | ICD-10-CM

## 2021-02-28 ENCOUNTER — OUTPATIENT (OUTPATIENT)
Dept: OUTPATIENT SERVICES | Facility: HOSPITAL | Age: 46
LOS: 1 days | End: 2021-02-28

## 2021-02-28 DIAGNOSIS — Z98.84 BARIATRIC SURGERY STATUS: Chronic | ICD-10-CM

## 2021-02-28 DIAGNOSIS — M54.2 CERVICALGIA: Chronic | ICD-10-CM

## 2021-03-04 ENCOUNTER — OUTPATIENT (OUTPATIENT)
Dept: OUTPATIENT SERVICES | Facility: HOSPITAL | Age: 46
LOS: 1 days | End: 2021-03-04

## 2021-03-04 DIAGNOSIS — M54.2 CERVICALGIA: Chronic | ICD-10-CM

## 2021-03-04 DIAGNOSIS — Z98.84 BARIATRIC SURGERY STATUS: Chronic | ICD-10-CM

## 2021-03-07 ENCOUNTER — OUTPATIENT (OUTPATIENT)
Dept: OUTPATIENT SERVICES | Facility: HOSPITAL | Age: 46
LOS: 1 days | End: 2021-03-07

## 2021-03-07 DIAGNOSIS — Z98.84 BARIATRIC SURGERY STATUS: Chronic | ICD-10-CM

## 2021-03-07 DIAGNOSIS — M54.2 CERVICALGIA: Chronic | ICD-10-CM

## 2021-03-11 ENCOUNTER — OUTPATIENT (OUTPATIENT)
Dept: OUTPATIENT SERVICES | Facility: HOSPITAL | Age: 46
LOS: 1 days | End: 2021-03-11

## 2021-03-11 DIAGNOSIS — M54.2 CERVICALGIA: Chronic | ICD-10-CM

## 2021-03-11 DIAGNOSIS — Z98.84 BARIATRIC SURGERY STATUS: Chronic | ICD-10-CM

## 2021-03-14 ENCOUNTER — OUTPATIENT (OUTPATIENT)
Dept: OUTPATIENT SERVICES | Facility: HOSPITAL | Age: 46
LOS: 1 days | End: 2021-03-14

## 2021-03-14 DIAGNOSIS — Z98.84 BARIATRIC SURGERY STATUS: Chronic | ICD-10-CM

## 2021-03-14 DIAGNOSIS — M54.2 CERVICALGIA: Chronic | ICD-10-CM

## 2021-03-18 ENCOUNTER — OUTPATIENT (OUTPATIENT)
Dept: OUTPATIENT SERVICES | Facility: HOSPITAL | Age: 46
LOS: 1 days | End: 2021-03-18

## 2021-03-18 DIAGNOSIS — M54.2 CERVICALGIA: Chronic | ICD-10-CM

## 2021-03-18 DIAGNOSIS — Z98.84 BARIATRIC SURGERY STATUS: Chronic | ICD-10-CM

## 2021-03-21 ENCOUNTER — OUTPATIENT (OUTPATIENT)
Dept: OUTPATIENT SERVICES | Facility: HOSPITAL | Age: 46
LOS: 1 days | End: 2021-03-21

## 2021-03-21 DIAGNOSIS — Z98.84 BARIATRIC SURGERY STATUS: Chronic | ICD-10-CM

## 2021-03-21 DIAGNOSIS — M54.2 CERVICALGIA: Chronic | ICD-10-CM

## 2021-03-25 ENCOUNTER — OUTPATIENT (OUTPATIENT)
Dept: OUTPATIENT SERVICES | Facility: HOSPITAL | Age: 46
LOS: 1 days | End: 2021-03-25

## 2021-03-25 DIAGNOSIS — Z98.84 BARIATRIC SURGERY STATUS: Chronic | ICD-10-CM

## 2021-03-25 DIAGNOSIS — M54.2 CERVICALGIA: Chronic | ICD-10-CM

## 2021-04-01 ENCOUNTER — OUTPATIENT (OUTPATIENT)
Dept: OUTPATIENT SERVICES | Facility: HOSPITAL | Age: 46
LOS: 1 days | End: 2021-04-01

## 2021-04-01 DIAGNOSIS — Z98.84 BARIATRIC SURGERY STATUS: Chronic | ICD-10-CM

## 2021-04-01 DIAGNOSIS — M54.2 CERVICALGIA: Chronic | ICD-10-CM

## 2021-04-08 ENCOUNTER — OUTPATIENT (OUTPATIENT)
Dept: OUTPATIENT SERVICES | Facility: HOSPITAL | Age: 46
LOS: 1 days | End: 2021-04-08

## 2021-04-08 DIAGNOSIS — M54.2 CERVICALGIA: Chronic | ICD-10-CM

## 2021-04-08 DIAGNOSIS — Z98.84 BARIATRIC SURGERY STATUS: Chronic | ICD-10-CM

## 2021-04-15 ENCOUNTER — OUTPATIENT (OUTPATIENT)
Dept: OUTPATIENT SERVICES | Facility: HOSPITAL | Age: 46
LOS: 1 days | End: 2021-04-15

## 2021-04-15 DIAGNOSIS — M54.2 CERVICALGIA: Chronic | ICD-10-CM

## 2021-04-15 DIAGNOSIS — Z98.84 BARIATRIC SURGERY STATUS: Chronic | ICD-10-CM

## 2021-04-16 ENCOUNTER — OUTPATIENT (OUTPATIENT)
Dept: OUTPATIENT SERVICES | Facility: HOSPITAL | Age: 46
LOS: 1 days | End: 2021-04-16

## 2021-04-16 DIAGNOSIS — M54.2 CERVICALGIA: Chronic | ICD-10-CM

## 2021-04-16 DIAGNOSIS — Z98.84 BARIATRIC SURGERY STATUS: Chronic | ICD-10-CM

## 2021-04-22 ENCOUNTER — OUTPATIENT (OUTPATIENT)
Dept: OUTPATIENT SERVICES | Facility: HOSPITAL | Age: 46
LOS: 1 days | End: 2021-04-22

## 2021-04-22 DIAGNOSIS — Z98.84 BARIATRIC SURGERY STATUS: Chronic | ICD-10-CM

## 2021-04-22 DIAGNOSIS — M54.2 CERVICALGIA: Chronic | ICD-10-CM

## 2021-04-29 ENCOUNTER — OUTPATIENT (OUTPATIENT)
Dept: OUTPATIENT SERVICES | Facility: HOSPITAL | Age: 46
LOS: 1 days | End: 2021-04-29

## 2021-04-29 DIAGNOSIS — Z98.84 BARIATRIC SURGERY STATUS: Chronic | ICD-10-CM

## 2021-04-29 DIAGNOSIS — M54.2 CERVICALGIA: Chronic | ICD-10-CM

## 2021-05-01 ENCOUNTER — OUTPATIENT (OUTPATIENT)
Dept: OUTPATIENT SERVICES | Facility: HOSPITAL | Age: 46
LOS: 1 days | End: 2021-05-01

## 2021-05-01 DIAGNOSIS — M54.2 CERVICALGIA: Chronic | ICD-10-CM

## 2021-05-01 DIAGNOSIS — Z98.84 BARIATRIC SURGERY STATUS: Chronic | ICD-10-CM

## 2021-05-06 ENCOUNTER — OUTPATIENT (OUTPATIENT)
Dept: OUTPATIENT SERVICES | Facility: HOSPITAL | Age: 46
LOS: 1 days | End: 2021-05-06

## 2021-05-06 DIAGNOSIS — Z98.84 BARIATRIC SURGERY STATUS: Chronic | ICD-10-CM

## 2021-05-06 DIAGNOSIS — M54.2 CERVICALGIA: Chronic | ICD-10-CM

## 2021-05-07 ENCOUNTER — OUTPATIENT (OUTPATIENT)
Dept: OUTPATIENT SERVICES | Facility: HOSPITAL | Age: 46
LOS: 1 days | End: 2021-05-07

## 2021-05-07 DIAGNOSIS — M54.2 CERVICALGIA: Chronic | ICD-10-CM

## 2021-05-07 DIAGNOSIS — Z98.84 BARIATRIC SURGERY STATUS: Chronic | ICD-10-CM

## 2021-05-08 ENCOUNTER — OUTPATIENT (OUTPATIENT)
Dept: OUTPATIENT SERVICES | Facility: HOSPITAL | Age: 46
LOS: 1 days | End: 2021-05-08

## 2021-05-08 DIAGNOSIS — Z98.84 BARIATRIC SURGERY STATUS: Chronic | ICD-10-CM

## 2021-05-08 DIAGNOSIS — M54.2 CERVICALGIA: Chronic | ICD-10-CM

## 2021-05-09 ENCOUNTER — OUTPATIENT (OUTPATIENT)
Dept: OUTPATIENT SERVICES | Facility: HOSPITAL | Age: 46
LOS: 1 days | End: 2021-05-09

## 2021-05-09 DIAGNOSIS — M54.2 CERVICALGIA: Chronic | ICD-10-CM

## 2021-05-09 DIAGNOSIS — Z98.84 BARIATRIC SURGERY STATUS: Chronic | ICD-10-CM

## 2021-05-10 ENCOUNTER — OUTPATIENT (OUTPATIENT)
Dept: OUTPATIENT SERVICES | Facility: HOSPITAL | Age: 46
LOS: 1 days | End: 2021-05-10

## 2021-05-10 DIAGNOSIS — M54.2 CERVICALGIA: Chronic | ICD-10-CM

## 2021-05-10 DIAGNOSIS — Z98.84 BARIATRIC SURGERY STATUS: Chronic | ICD-10-CM

## 2021-05-11 ENCOUNTER — OUTPATIENT (OUTPATIENT)
Dept: OUTPATIENT SERVICES | Facility: HOSPITAL | Age: 46
LOS: 1 days | End: 2021-05-11

## 2021-05-11 DIAGNOSIS — Z98.84 BARIATRIC SURGERY STATUS: Chronic | ICD-10-CM

## 2021-05-11 DIAGNOSIS — M54.2 CERVICALGIA: Chronic | ICD-10-CM

## 2021-05-12 ENCOUNTER — OUTPATIENT (OUTPATIENT)
Dept: OUTPATIENT SERVICES | Facility: HOSPITAL | Age: 46
LOS: 1 days | End: 2021-05-12

## 2021-05-12 DIAGNOSIS — M54.2 CERVICALGIA: Chronic | ICD-10-CM

## 2021-05-12 DIAGNOSIS — Z98.84 BARIATRIC SURGERY STATUS: Chronic | ICD-10-CM

## 2021-05-14 ENCOUNTER — OUTPATIENT (OUTPATIENT)
Dept: OUTPATIENT SERVICES | Facility: HOSPITAL | Age: 46
LOS: 1 days | End: 2021-05-14

## 2021-05-14 DIAGNOSIS — Z98.84 BARIATRIC SURGERY STATUS: Chronic | ICD-10-CM

## 2021-05-14 DIAGNOSIS — M54.2 CERVICALGIA: Chronic | ICD-10-CM

## 2021-05-20 ENCOUNTER — OUTPATIENT (OUTPATIENT)
Dept: OUTPATIENT SERVICES | Facility: HOSPITAL | Age: 46
LOS: 1 days | End: 2021-05-20

## 2021-05-20 DIAGNOSIS — M54.2 CERVICALGIA: Chronic | ICD-10-CM

## 2021-05-20 DIAGNOSIS — Z98.84 BARIATRIC SURGERY STATUS: Chronic | ICD-10-CM

## 2021-05-27 ENCOUNTER — OUTPATIENT (OUTPATIENT)
Dept: OUTPATIENT SERVICES | Facility: HOSPITAL | Age: 46
LOS: 1 days | End: 2021-05-27

## 2021-05-27 DIAGNOSIS — M54.2 CERVICALGIA: Chronic | ICD-10-CM

## 2021-05-27 DIAGNOSIS — Z98.84 BARIATRIC SURGERY STATUS: Chronic | ICD-10-CM

## 2021-06-03 ENCOUNTER — OUTPATIENT (OUTPATIENT)
Dept: OUTPATIENT SERVICES | Facility: HOSPITAL | Age: 46
LOS: 1 days | End: 2021-06-03

## 2021-06-03 DIAGNOSIS — M54.2 CERVICALGIA: Chronic | ICD-10-CM

## 2021-06-03 DIAGNOSIS — Z98.84 BARIATRIC SURGERY STATUS: Chronic | ICD-10-CM

## 2021-06-04 ENCOUNTER — OUTPATIENT (OUTPATIENT)
Dept: OUTPATIENT SERVICES | Facility: HOSPITAL | Age: 46
LOS: 1 days | End: 2021-06-04

## 2021-06-04 DIAGNOSIS — M54.2 CERVICALGIA: Chronic | ICD-10-CM

## 2021-06-04 DIAGNOSIS — Z98.84 BARIATRIC SURGERY STATUS: Chronic | ICD-10-CM

## 2021-06-10 ENCOUNTER — OUTPATIENT (OUTPATIENT)
Dept: OUTPATIENT SERVICES | Facility: HOSPITAL | Age: 46
LOS: 1 days | End: 2021-06-10

## 2021-06-10 DIAGNOSIS — M54.2 CERVICALGIA: Chronic | ICD-10-CM

## 2021-06-10 DIAGNOSIS — Z98.84 BARIATRIC SURGERY STATUS: Chronic | ICD-10-CM

## 2021-06-17 ENCOUNTER — OUTPATIENT (OUTPATIENT)
Dept: OUTPATIENT SERVICES | Facility: HOSPITAL | Age: 46
LOS: 1 days | End: 2021-06-17

## 2021-06-17 DIAGNOSIS — Z98.84 BARIATRIC SURGERY STATUS: Chronic | ICD-10-CM

## 2021-06-17 DIAGNOSIS — M54.2 CERVICALGIA: Chronic | ICD-10-CM

## 2021-06-24 ENCOUNTER — OUTPATIENT (OUTPATIENT)
Dept: OUTPATIENT SERVICES | Facility: HOSPITAL | Age: 46
LOS: 1 days | End: 2021-06-24

## 2021-06-24 DIAGNOSIS — Z98.84 BARIATRIC SURGERY STATUS: Chronic | ICD-10-CM

## 2021-06-24 DIAGNOSIS — M54.2 CERVICALGIA: Chronic | ICD-10-CM

## 2021-07-01 ENCOUNTER — OUTPATIENT (OUTPATIENT)
Dept: OUTPATIENT SERVICES | Facility: HOSPITAL | Age: 46
LOS: 1 days | End: 2021-07-01

## 2021-07-01 DIAGNOSIS — M54.2 CERVICALGIA: Chronic | ICD-10-CM

## 2021-07-01 DIAGNOSIS — Z98.84 BARIATRIC SURGERY STATUS: Chronic | ICD-10-CM

## 2021-07-08 ENCOUNTER — OUTPATIENT (OUTPATIENT)
Dept: OUTPATIENT SERVICES | Facility: HOSPITAL | Age: 46
LOS: 1 days | End: 2021-07-08

## 2021-07-08 DIAGNOSIS — Z98.84 BARIATRIC SURGERY STATUS: Chronic | ICD-10-CM

## 2021-07-08 DIAGNOSIS — M54.2 CERVICALGIA: Chronic | ICD-10-CM

## 2021-07-15 ENCOUNTER — OUTPATIENT (OUTPATIENT)
Dept: OUTPATIENT SERVICES | Facility: HOSPITAL | Age: 46
LOS: 1 days | End: 2021-07-15

## 2021-07-15 DIAGNOSIS — Z98.84 BARIATRIC SURGERY STATUS: Chronic | ICD-10-CM

## 2021-07-15 DIAGNOSIS — M54.2 CERVICALGIA: Chronic | ICD-10-CM

## 2021-07-22 ENCOUNTER — OUTPATIENT (OUTPATIENT)
Dept: OUTPATIENT SERVICES | Facility: HOSPITAL | Age: 46
LOS: 1 days | End: 2021-07-22

## 2021-07-22 DIAGNOSIS — M54.2 CERVICALGIA: Chronic | ICD-10-CM

## 2021-07-22 DIAGNOSIS — Z98.84 BARIATRIC SURGERY STATUS: Chronic | ICD-10-CM

## 2021-07-23 ENCOUNTER — OUTPATIENT (OUTPATIENT)
Dept: OUTPATIENT SERVICES | Facility: HOSPITAL | Age: 46
LOS: 1 days | End: 2021-07-23

## 2021-07-23 DIAGNOSIS — M54.2 CERVICALGIA: Chronic | ICD-10-CM

## 2021-07-23 DIAGNOSIS — Z98.84 BARIATRIC SURGERY STATUS: Chronic | ICD-10-CM

## 2021-07-29 ENCOUNTER — OUTPATIENT (OUTPATIENT)
Dept: OUTPATIENT SERVICES | Facility: HOSPITAL | Age: 46
LOS: 1 days | End: 2021-07-29

## 2021-07-29 DIAGNOSIS — Z98.84 BARIATRIC SURGERY STATUS: Chronic | ICD-10-CM

## 2021-07-29 DIAGNOSIS — M54.2 CERVICALGIA: Chronic | ICD-10-CM

## 2021-08-05 ENCOUNTER — OUTPATIENT (OUTPATIENT)
Dept: OUTPATIENT SERVICES | Facility: HOSPITAL | Age: 46
LOS: 1 days | End: 2021-08-05

## 2021-08-05 DIAGNOSIS — M54.2 CERVICALGIA: Chronic | ICD-10-CM

## 2021-08-05 DIAGNOSIS — Z98.84 BARIATRIC SURGERY STATUS: Chronic | ICD-10-CM

## 2021-08-12 ENCOUNTER — OUTPATIENT (OUTPATIENT)
Dept: OUTPATIENT SERVICES | Facility: HOSPITAL | Age: 46
LOS: 1 days | End: 2021-08-12

## 2021-08-12 DIAGNOSIS — M54.2 CERVICALGIA: Chronic | ICD-10-CM

## 2021-08-12 DIAGNOSIS — Z98.84 BARIATRIC SURGERY STATUS: Chronic | ICD-10-CM

## 2021-08-19 ENCOUNTER — OUTPATIENT (OUTPATIENT)
Dept: OUTPATIENT SERVICES | Facility: HOSPITAL | Age: 46
LOS: 1 days | End: 2021-08-19

## 2021-08-19 DIAGNOSIS — Z98.84 BARIATRIC SURGERY STATUS: Chronic | ICD-10-CM

## 2021-08-19 DIAGNOSIS — M54.2 CERVICALGIA: Chronic | ICD-10-CM

## 2021-08-23 ENCOUNTER — OUTPATIENT (OUTPATIENT)
Dept: OUTPATIENT SERVICES | Facility: HOSPITAL | Age: 46
LOS: 1 days | End: 2021-08-23

## 2021-08-23 DIAGNOSIS — M54.2 CERVICALGIA: Chronic | ICD-10-CM

## 2021-08-23 DIAGNOSIS — Z98.84 BARIATRIC SURGERY STATUS: Chronic | ICD-10-CM

## 2021-08-26 ENCOUNTER — OUTPATIENT (OUTPATIENT)
Dept: OUTPATIENT SERVICES | Facility: HOSPITAL | Age: 46
LOS: 1 days | End: 2021-08-26

## 2021-08-26 DIAGNOSIS — M54.2 CERVICALGIA: Chronic | ICD-10-CM

## 2021-08-26 DIAGNOSIS — Z98.84 BARIATRIC SURGERY STATUS: Chronic | ICD-10-CM

## 2021-09-06 ENCOUNTER — OUTPATIENT (OUTPATIENT)
Dept: OUTPATIENT SERVICES | Facility: HOSPITAL | Age: 46
LOS: 1 days | End: 2021-09-06

## 2021-09-06 DIAGNOSIS — Z98.84 BARIATRIC SURGERY STATUS: Chronic | ICD-10-CM

## 2021-09-06 DIAGNOSIS — M54.2 CERVICALGIA: Chronic | ICD-10-CM

## 2021-09-09 ENCOUNTER — OUTPATIENT (OUTPATIENT)
Dept: OUTPATIENT SERVICES | Facility: HOSPITAL | Age: 46
LOS: 1 days | End: 2021-09-09

## 2021-09-09 DIAGNOSIS — Z98.84 BARIATRIC SURGERY STATUS: Chronic | ICD-10-CM

## 2021-09-09 DIAGNOSIS — M54.2 CERVICALGIA: Chronic | ICD-10-CM

## 2021-09-16 ENCOUNTER — OUTPATIENT (OUTPATIENT)
Dept: OUTPATIENT SERVICES | Facility: HOSPITAL | Age: 46
LOS: 1 days | End: 2021-09-16

## 2021-09-16 DIAGNOSIS — M54.2 CERVICALGIA: Chronic | ICD-10-CM

## 2021-09-16 DIAGNOSIS — Z98.84 BARIATRIC SURGERY STATUS: Chronic | ICD-10-CM

## 2021-09-17 ENCOUNTER — OUTPATIENT (OUTPATIENT)
Dept: OUTPATIENT SERVICES | Facility: HOSPITAL | Age: 46
LOS: 1 days | End: 2021-09-17

## 2021-09-17 DIAGNOSIS — Z98.84 BARIATRIC SURGERY STATUS: Chronic | ICD-10-CM

## 2021-09-17 DIAGNOSIS — M54.2 CERVICALGIA: Chronic | ICD-10-CM

## 2021-09-19 ENCOUNTER — OUTPATIENT (OUTPATIENT)
Dept: OUTPATIENT SERVICES | Facility: HOSPITAL | Age: 46
LOS: 1 days | End: 2021-09-19

## 2021-09-19 DIAGNOSIS — M54.2 CERVICALGIA: Chronic | ICD-10-CM

## 2021-09-19 DIAGNOSIS — Z98.84 BARIATRIC SURGERY STATUS: Chronic | ICD-10-CM

## 2021-09-23 ENCOUNTER — OUTPATIENT (OUTPATIENT)
Dept: OUTPATIENT SERVICES | Facility: HOSPITAL | Age: 46
LOS: 1 days | End: 2021-09-23

## 2021-09-23 DIAGNOSIS — Z98.84 BARIATRIC SURGERY STATUS: Chronic | ICD-10-CM

## 2021-09-23 DIAGNOSIS — M54.2 CERVICALGIA: Chronic | ICD-10-CM

## 2021-09-30 ENCOUNTER — OUTPATIENT (OUTPATIENT)
Dept: OUTPATIENT SERVICES | Facility: HOSPITAL | Age: 46
LOS: 1 days | End: 2021-09-30

## 2021-09-30 DIAGNOSIS — Z98.84 BARIATRIC SURGERY STATUS: Chronic | ICD-10-CM

## 2021-09-30 DIAGNOSIS — M54.2 CERVICALGIA: Chronic | ICD-10-CM

## 2021-10-01 ENCOUNTER — OUTPATIENT (OUTPATIENT)
Dept: OUTPATIENT SERVICES | Facility: HOSPITAL | Age: 46
LOS: 1 days | End: 2021-10-01

## 2021-10-01 DIAGNOSIS — Z98.84 BARIATRIC SURGERY STATUS: Chronic | ICD-10-CM

## 2021-10-01 DIAGNOSIS — M54.2 CERVICALGIA: Chronic | ICD-10-CM

## 2021-10-03 ENCOUNTER — OUTPATIENT (OUTPATIENT)
Dept: OUTPATIENT SERVICES | Facility: HOSPITAL | Age: 46
LOS: 1 days | End: 2021-10-03

## 2021-10-03 DIAGNOSIS — Z98.84 BARIATRIC SURGERY STATUS: Chronic | ICD-10-CM

## 2021-10-03 DIAGNOSIS — M54.2 CERVICALGIA: Chronic | ICD-10-CM

## 2021-10-07 ENCOUNTER — OUTPATIENT (OUTPATIENT)
Dept: OUTPATIENT SERVICES | Facility: HOSPITAL | Age: 46
LOS: 1 days | End: 2021-10-07

## 2021-10-07 DIAGNOSIS — Z98.84 BARIATRIC SURGERY STATUS: Chronic | ICD-10-CM

## 2021-10-07 DIAGNOSIS — M54.2 CERVICALGIA: Chronic | ICD-10-CM

## 2021-10-14 ENCOUNTER — OUTPATIENT (OUTPATIENT)
Dept: OUTPATIENT SERVICES | Facility: HOSPITAL | Age: 46
LOS: 1 days | End: 2021-10-14

## 2021-10-14 DIAGNOSIS — M54.2 CERVICALGIA: Chronic | ICD-10-CM

## 2021-10-14 DIAGNOSIS — Z98.84 BARIATRIC SURGERY STATUS: Chronic | ICD-10-CM

## 2021-10-15 ENCOUNTER — OUTPATIENT (OUTPATIENT)
Dept: OUTPATIENT SERVICES | Facility: HOSPITAL | Age: 46
LOS: 1 days | End: 2021-10-15

## 2021-10-15 DIAGNOSIS — Z98.84 BARIATRIC SURGERY STATUS: Chronic | ICD-10-CM

## 2021-10-15 DIAGNOSIS — M54.2 CERVICALGIA: Chronic | ICD-10-CM

## 2021-10-21 ENCOUNTER — OUTPATIENT (OUTPATIENT)
Dept: OUTPATIENT SERVICES | Facility: HOSPITAL | Age: 46
LOS: 1 days | End: 2021-10-21

## 2021-10-21 DIAGNOSIS — Z98.84 BARIATRIC SURGERY STATUS: Chronic | ICD-10-CM

## 2021-10-21 DIAGNOSIS — M54.2 CERVICALGIA: Chronic | ICD-10-CM

## 2021-10-27 ENCOUNTER — OUTPATIENT (OUTPATIENT)
Dept: OUTPATIENT SERVICES | Facility: HOSPITAL | Age: 46
LOS: 1 days | End: 2021-10-27

## 2021-10-27 DIAGNOSIS — M54.2 CERVICALGIA: Chronic | ICD-10-CM

## 2021-10-27 DIAGNOSIS — Z98.84 BARIATRIC SURGERY STATUS: Chronic | ICD-10-CM

## 2021-10-28 ENCOUNTER — OUTPATIENT (OUTPATIENT)
Dept: OUTPATIENT SERVICES | Facility: HOSPITAL | Age: 46
LOS: 1 days | End: 2021-10-28
Payer: MEDICARE

## 2021-10-28 DIAGNOSIS — M54.2 CERVICALGIA: Chronic | ICD-10-CM

## 2021-10-28 DIAGNOSIS — Z98.84 BARIATRIC SURGERY STATUS: Chronic | ICD-10-CM

## 2021-10-28 PROCEDURE — 99285 EMERGENCY DEPT VISIT HI MDM: CPT

## 2021-10-28 PROCEDURE — 70450 CT HEAD/BRAIN W/O DYE: CPT | Mod: 26,77

## 2021-10-28 PROCEDURE — 93010 ELECTROCARDIOGRAM REPORT: CPT

## 2021-10-28 PROCEDURE — 70450 CT HEAD/BRAIN W/O DYE: CPT | Mod: 26

## 2021-10-28 PROCEDURE — 71045 X-RAY EXAM CHEST 1 VIEW: CPT | Mod: 26

## 2021-10-29 ENCOUNTER — INPATIENT (INPATIENT)
Facility: HOSPITAL | Age: 46
LOS: 0 days | Discharge: ANOTHER HOSPITAL | End: 2021-10-30
Payer: MEDICARE

## 2021-10-29 DIAGNOSIS — M54.2 CERVICALGIA: Chronic | ICD-10-CM

## 2021-10-29 DIAGNOSIS — Z98.84 BARIATRIC SURGERY STATUS: Chronic | ICD-10-CM

## 2021-10-29 PROCEDURE — 70450 CT HEAD/BRAIN W/O DYE: CPT | Mod: 26

## 2021-10-30 ENCOUNTER — INPATIENT (INPATIENT)
Facility: HOSPITAL | Age: 46
LOS: 17 days | Discharge: HOME | DRG: 100 | End: 2021-11-17
Attending: HOSPITALIST | Admitting: INTERNAL MEDICINE
Payer: MEDICARE

## 2021-10-30 VITALS — RESPIRATION RATE: 19 BRPM | OXYGEN SATURATION: 100 % | HEART RATE: 99 BPM

## 2021-10-30 DIAGNOSIS — G40.509 EPILEPTIC SEIZURES RELATED TO EXTERNAL CAUSES, NOT INTRACTABLE, WITHOUT STATUS EPILEPTICUS: ICD-10-CM

## 2021-10-30 DIAGNOSIS — M54.2 CERVICALGIA: Chronic | ICD-10-CM

## 2021-10-30 DIAGNOSIS — Z98.84 BARIATRIC SURGERY STATUS: Chronic | ICD-10-CM

## 2021-10-30 LAB
ALBUMIN SERPL ELPH-MCNC: 2.6 G/DL — LOW (ref 3.3–5.2)
ALP SERPL-CCNC: 118 U/L — SIGNIFICANT CHANGE UP (ref 40–120)
ALT FLD-CCNC: 22 U/L — SIGNIFICANT CHANGE UP
ANION GAP SERPL CALC-SCNC: 15 MMOL/L — SIGNIFICANT CHANGE UP (ref 5–17)
APPEARANCE CSF: ABNORMAL
APTT BLD: 36 SEC — HIGH (ref 27.5–35.5)
AST SERPL-CCNC: 31 U/L — SIGNIFICANT CHANGE UP
BILIRUB SERPL-MCNC: 0.3 MG/DL — LOW (ref 0.4–2)
BUN SERPL-MCNC: 26.6 MG/DL — HIGH (ref 8–20)
CALCIUM SERPL-MCNC: 8.6 MG/DL — SIGNIFICANT CHANGE UP (ref 8.6–10.2)
CHLORIDE SERPL-SCNC: 107 MMOL/L — SIGNIFICANT CHANGE UP (ref 98–107)
CO2 SERPL-SCNC: 22 MMOL/L — SIGNIFICANT CHANGE UP (ref 22–29)
COLOR CSF: ABNORMAL
CREAT SERPL-MCNC: 1.44 MG/DL — HIGH (ref 0.5–1.3)
GLUCOSE SERPL-MCNC: 86 MG/DL — SIGNIFICANT CHANGE UP (ref 70–99)
HCT VFR BLD CALC: 43.3 % — SIGNIFICANT CHANGE UP (ref 34.5–45)
HGB BLD-MCNC: 13.6 G/DL — SIGNIFICANT CHANGE UP (ref 11.5–15.5)
INR BLD: 1.4 RATIO — HIGH (ref 0.88–1.16)
LACTATE SERPL-SCNC: 1.3 MMOL/L — SIGNIFICANT CHANGE UP (ref 0.5–2)
MAGNESIUM SERPL-MCNC: 2.3 MG/DL — SIGNIFICANT CHANGE UP (ref 1.6–2.6)
MCHC RBC-ENTMCNC: 27.8 PG — SIGNIFICANT CHANGE UP (ref 27–34)
MCHC RBC-ENTMCNC: 31.4 GM/DL — LOW (ref 32–36)
MCV RBC AUTO: 88.4 FL — SIGNIFICANT CHANGE UP (ref 80–100)
NEUTROPHILS # CSF: SIGNIFICANT CHANGE UP (ref 0–6)
NRBC NFR CSF: 3 /UL — SIGNIFICANT CHANGE UP (ref 0–5)
PHOSPHATE SERPL-MCNC: 2.6 MG/DL — SIGNIFICANT CHANGE UP (ref 2.4–4.7)
PLATELET # BLD AUTO: 185 K/UL — SIGNIFICANT CHANGE UP (ref 150–400)
POTASSIUM SERPL-MCNC: 3.8 MMOL/L — SIGNIFICANT CHANGE UP (ref 3.5–5.3)
POTASSIUM SERPL-SCNC: 3.8 MMOL/L — SIGNIFICANT CHANGE UP (ref 3.5–5.3)
PROT SERPL-MCNC: 6.5 G/DL — LOW (ref 6.6–8.7)
PROTHROM AB SERPL-ACNC: 16 SEC — HIGH (ref 10.6–13.6)
RBC # BLD: 4.9 M/UL — SIGNIFICANT CHANGE UP (ref 3.8–5.2)
RBC # CSF: 6540 /CMM — HIGH (ref 0–1)
RBC # FLD: 15 % — HIGH (ref 10.3–14.5)
SODIUM SERPL-SCNC: 144 MMOL/L — SIGNIFICANT CHANGE UP (ref 135–145)
TUBE TYPE: SIGNIFICANT CHANGE UP
WBC # BLD: 20.96 K/UL — HIGH (ref 3.8–10.5)
WBC # FLD AUTO: 20.96 K/UL — HIGH (ref 3.8–10.5)

## 2021-10-30 PROCEDURE — 99223 1ST HOSP IP/OBS HIGH 75: CPT

## 2021-10-30 PROCEDURE — 93971 EXTREMITY STUDY: CPT | Mod: 26,LT

## 2021-10-30 PROCEDURE — 95720 EEG PHY/QHP EA INCR W/VEEG: CPT

## 2021-10-30 PROCEDURE — 62270 DX LMBR SPI PNXR: CPT

## 2021-10-30 RX ORDER — VALPROIC ACID (AS SODIUM SALT) 250 MG/5ML
250 SOLUTION, ORAL ORAL EVERY 8 HOURS
Refills: 0 | Status: DISCONTINUED | OUTPATIENT
Start: 2021-10-30 | End: 2021-10-30

## 2021-10-30 RX ORDER — HEPARIN SODIUM 5000 [USP'U]/ML
8000 INJECTION INTRAVENOUS; SUBCUTANEOUS EVERY 6 HOURS
Refills: 0 | Status: DISCONTINUED | OUTPATIENT
Start: 2021-10-30 | End: 2021-11-02

## 2021-10-30 RX ORDER — HEPARIN SODIUM 5000 [USP'U]/ML
INJECTION INTRAVENOUS; SUBCUTANEOUS
Qty: 25000 | Refills: 0 | Status: DISCONTINUED | OUTPATIENT
Start: 2021-10-30 | End: 2021-11-02

## 2021-10-30 RX ORDER — VALPROIC ACID (AS SODIUM SALT) 250 MG/5ML
500 SOLUTION, ORAL ORAL EVERY 12 HOURS
Refills: 0 | Status: DISCONTINUED | OUTPATIENT
Start: 2021-10-31 | End: 2021-11-01

## 2021-10-30 RX ORDER — SODIUM CHLORIDE 9 MG/ML
1000 INJECTION, SOLUTION INTRAVENOUS
Refills: 0 | Status: DISCONTINUED | OUTPATIENT
Start: 2021-10-30 | End: 2021-10-31

## 2021-10-30 RX ORDER — VALPROIC ACID (AS SODIUM SALT) 250 MG/5ML
250 SOLUTION, ORAL ORAL ONCE
Refills: 0 | Status: COMPLETED | OUTPATIENT
Start: 2021-10-30 | End: 2021-10-30

## 2021-10-30 RX ORDER — MEROPENEM 1 G/30ML
1000 INJECTION INTRAVENOUS EVERY 12 HOURS
Refills: 0 | Status: DISCONTINUED | OUTPATIENT
Start: 2021-10-30 | End: 2021-11-02

## 2021-10-30 RX ORDER — HEPARIN SODIUM 5000 [USP'U]/ML
4000 INJECTION INTRAVENOUS; SUBCUTANEOUS EVERY 6 HOURS
Refills: 0 | Status: DISCONTINUED | OUTPATIENT
Start: 2021-10-30 | End: 2021-11-02

## 2021-10-30 RX ORDER — FUROSEMIDE 40 MG
80 TABLET ORAL ONCE
Refills: 0 | Status: COMPLETED | OUTPATIENT
Start: 2021-10-30 | End: 2021-10-30

## 2021-10-30 RX ORDER — CHLORHEXIDINE GLUCONATE 213 G/1000ML
1 SOLUTION TOPICAL
Refills: 0 | Status: DISCONTINUED | OUTPATIENT
Start: 2021-10-30 | End: 2021-11-17

## 2021-10-30 RX ORDER — PANTOPRAZOLE SODIUM 20 MG/1
40 TABLET, DELAYED RELEASE ORAL DAILY
Refills: 0 | Status: DISCONTINUED | OUTPATIENT
Start: 2021-10-30 | End: 2021-11-04

## 2021-10-30 RX ORDER — LEVETIRACETAM 250 MG/1
1500 TABLET, FILM COATED ORAL EVERY 12 HOURS
Refills: 0 | Status: DISCONTINUED | OUTPATIENT
Start: 2021-10-30 | End: 2021-10-31

## 2021-10-30 RX ADMIN — Medication 26.25 MILLIGRAM(S): at 17:16

## 2021-10-30 RX ADMIN — Medication 26.25 MILLIGRAM(S): at 19:42

## 2021-10-30 RX ADMIN — HEPARIN SODIUM 1800 UNIT(S)/HR: 5000 INJECTION INTRAVENOUS; SUBCUTANEOUS at 20:17

## 2021-10-30 RX ADMIN — LEVETIRACETAM 400 MILLIGRAM(S): 250 TABLET, FILM COATED ORAL at 19:43

## 2021-10-30 RX ADMIN — Medication 26.25 MILLIGRAM(S): at 09:48

## 2021-10-30 RX ADMIN — LEVETIRACETAM 400 MILLIGRAM(S): 250 TABLET, FILM COATED ORAL at 08:49

## 2021-10-30 RX ADMIN — CHLORHEXIDINE GLUCONATE 1 APPLICATION(S): 213 SOLUTION TOPICAL at 09:49

## 2021-10-30 RX ADMIN — SODIUM CHLORIDE 100 MILLILITER(S): 9 INJECTION, SOLUTION INTRAVENOUS at 09:49

## 2021-10-30 RX ADMIN — MEROPENEM 100 MILLIGRAM(S): 1 INJECTION INTRAVENOUS at 17:15

## 2021-10-30 RX ADMIN — Medication 80 MILLIGRAM(S): at 14:52

## 2021-10-30 RX ADMIN — PANTOPRAZOLE SODIUM 40 MILLIGRAM(S): 20 TABLET, DELAYED RELEASE ORAL at 11:25

## 2021-10-30 NOTE — CONSULT NOTE ADULT - SUBJECTIVE AND OBJECTIVE BOX
[facility]                                        [facility] [place]                                  [address] [facility] [address]                                      [address]                                           [place], [place], [address]                                                [phone]        CC: seizure     HISTORY:  The patient is a 46y Female with a history of traumatic Brain Injury and paraplegia who is wheelchair bound at baseline. She had tracheostomy which was decannulated and PEG.   At baseline she reportedly is able to speak/converse.   She was sent from her Nursing Home to [facility] secondary to altered mental status.   She was reportedly noted to be hypotensive, febrile, and became unresponsive.   Code stroke called initially and later code sepsis called.   EEG at [facility] reportedly showed bilateral GPEDs.   The patient was transferred to [facility] for c-EEG.     She has reportedly been on Keppra 500 mg twice per day at the nursing home.   She was loaded with Keppra and started on 1500 mg twice per day here.     PAST MEDICAL & SURGICAL HISTORY:  TBI (traumatic brain injury)  History of paraplegia  DVT, lower extremity  Chronic neck pain with history of cervical spinal surgery  History of Lorenza-en-Y gastric bypass    MEDICATIONS  (STANDING):  chlorhexidine 4% Liquid 1 Application(s) Topical <User Schedule>  levETIRAcetam  IVPB 1500 milliGRAM(s) IV Intermittent every 12 hours  meropenem  IVPB 1000 milliGRAM(s) IV Intermittent every 12 hours  multiple electrolytes Injection Type 1 1000 milliLiter(s) (100 mL/Hr) IV Continuous <Continuous>  pantoprazole  Injectable 40 milliGRAM(s) IV Push daily  valproate sodium IVPB 250 milliGRAM(s) IV Intermittent every 8 hours    Allergies  No Known Allergies    SOCIAL HISTORY:  Non smoker.     FAMILY HISTORY:  FH: stroke (father).  FH: diabetes mellitus  mom  Family history of hepatitis C  mom    ROS:  Constitutional: Unobtainable due to patient's condition.   Neuro: Unobtainable due to patient's condition.   Eyes: Unobtainable due to patient's condition.   Ears/nose/throat: Unobtainable due to patient's condition.   Cardiac: Unobtainable due to patient's condition.   Respiratory: Unobtainable due to patient's condition.   GI: Unobtainable due to patient's condition.   : Unobtainable due to patient's condition..  Integumentary: Unobtainable due to patient's condition.  Psych: Unobtainable due to patient's condition.  Heme: Unobtainable due to patient's condition.    Exam:  Vital Signs Last 24 Hrs  T(C): 36.8 (30 Oct 2021 11:44), Max: 37 (30 Oct 2021 05:00)  T(F): 98.2 (30 Oct 2021 11:44), Max: 98.6 (30 Oct 2021 05:00)  HR: 99 (30 Oct 2021 10:00) (90 - 101)  BP: 116/89 (30 Oct 2021 10:00) (116/89 - 145/86)  BP(mean): 99 (30 Oct 2021 10:00) (89 - 104)  RR: 19 (30 Oct 2021 10:00) (12 - 19)  SpO2: 99% (30 Oct 2021 10:00) (99% - 100%)  General: NAD.   Carotid bruits absent.     Mental status: Unresponsive to voice. Non verbal.    Cranial nerves: There is no papilledema. Pupils react symmetrically to light. There is no blink to threat to confrontation on either side. She does not track with gaze. Eyes move conjugately to oculocephalic maneuvers. Face is grossly symmetric. Palate and tongue cannot be assessed.     Motor/Sensory: There is increased tone bilaterally.  No purposeful movement to stimuli.    Reflexes: 2+ throughout and plantar responses are extensor bilaterally.    Cerebellar: Cannot be assessed.     LABS:                         13.6   20.96 )-----------( 185      ( 30 Oct 2021 07:08 )             43.3       10-30    144  |  107  |  26.6<H>  ----------------------------<  86  3.8   |  22.0  |  1.44<H>    Ca    8.6      30 Oct 2021 07:08  Phos  2.6     10-30  Mg     2.3     10-30    TPro  6.5<L>  /  Alb  2.6<L>  /  TBili  0.3<L>  /  DBili  x   /  AST  31  /  ALT  22  /  AlkPhos  118  10-30      PT/INR - ( 30 Oct 2021 07:08 )   PT: 16.0 sec;   INR: 1.40 ratio    PTT - ( 30 Oct 2021 07:08 )  PTT:36.0 sec    RADIOLOGY   CT head images reviewed (and concur with report): There is no acute pathology. There is bifrontal encephalomalacia with the left more than the right.

## 2021-10-30 NOTE — H&P ADULT - NSHPPHYSICALEXAM_GEN_ALL_CORE
HEENT: Pupils equal, sluggish to light  PULM: Clear to auscultation bilaterally  CVS: Regular rate and rhythm  ABD: Soft, nondistended  EXT: No edema, nontender  SKIN: Warm and well perfused, no rashes noted  NEURO: Unresponsive, withdraws UEs to noxious stimuli. Paraplegic LEs (does not withdraw)

## 2021-10-30 NOTE — H&P ADULT - NSICDXFAMILYHX_GEN_ALL_CORE_FT
FAMILY HISTORY:  Family history of hepatitis C, mom  FH: diabetes mellitus, mom  FH: stroke, dad

## 2021-10-30 NOTE — CHART NOTE - NSCHARTNOTEFT_GEN_A_CORE
[facility] COMPREHENSIVE EPILEPSY CENTER    ** PRELIMINARY EEG reviewed until  13:35    - Near continuous generalized periodic discharges at 2-2.5Hz.  - This indicates diffuse cortical irritability, and risk of generalized onset seizures.   - No distincy seizures seen so far.      Final report to be completed at the completion of the study tomorrow morning.    -----------------------------  aCleb Leon MD, EMMY  Epilepsy Fellow  [facility]  --------------------------------  EEG Reading Room: [phone]  (weekdays)  On Call Service After Hours: [phone] [facility] COMPREHENSIVE EPILEPSY CENTER    ** PRELIMINARY EEG reviewed until  13:35    - Near continuous generalized periodic discharges at 2-2.5Hz (GPDs)  - This indicates diffuse cortical irritability, and risk of generalized onset seizures.   - No distincy seizures seen so far.      Final report to be completed at the completion of the study tomorrow morning.    -----------------------------  Caleb Leon MD, EMMY  Epilepsy Fellow  [facility]  --------------------------------  EEG Reading Room: [phone]  (weekdays)  On Call Service After Hours: [phone] [facility] COMPREHENSIVE EPILEPSY CENTER    ** PRELIMINARY EEG reviewed until  13:35    - Near continuous generalized periodic discharges at 2-2.5Hz (GPDs) with severe background slowing.   - This indicates diffuse cortical irritability, and risk of generalized onset seizures.   - No distinct seizures seen so far.      Final report to be completed at the completion of the study tomorrow morning.    -----------------------------  Caleb Leon MD, EMMY  Epilepsy Fellow  [facility]  --------------------------------  EEG Reading Room: [phone]  (weekdays)  On Call Service After Hours: [phone]

## 2021-10-30 NOTE — CONSULT NOTE ADULT - ASSESSMENT
The patient is a 46y Female with a history of Traumatic Brain Injury now presenting with altered mental status and possible sepsis.     Seizure.  She reportedly is on Keppra at her nursing home.   It is not clear if she had a seizure there, or at [facility].  Agree with c-EEG monitoring.   If no seizure activity would reduce Keppra to her outpatient dose and continue monitoring.     EEG finding from [facility] not available at this time, however description suggest bilateral hemispheric damage. There is only chronic encephalomalacia on CT with no acute finding.    Sepsis.  Seen by ID.   Antibiotics per ICU and ID.     Case discussed with ICU team. Dr Bell attending.

## 2021-10-30 NOTE — H&P ADULT - ASSESSMENT
46F w/ PMHx TBI, functional paraplegia, W/C bound at baseline, C spine fixation, s/p trach/ PEG (now decannulated), seizure d/o, urinary retention w/ indwelling arthur, DVT on Coumadin, ESBL E Coli bacteriemia was sent [place] from NH [place] [facility] [place] and now transferred to [facility] for cvEEG    r/o non convulsive status epilepticus  ESBL E Coli bacteriemia   LUE swollen ? infiltration     Neuro: Responsive only to noxious stimuli. Loaded w/ Keppra and VA at [facility], continue both meds for now. CTH x 3 were grossly normal. EEG w/ B/L GPEDs and neurology was concerned for a catastrophic neurological event given her clinical condition, cvEEG requested. Will need NICU/ epilepsy input   Cardiovascular: Aim for MAP target 65. No pressors requirements at this time  Resp/Chest: Maintain SpO2 > 92%. Oxygenation well on RA. Aspiration precautions. EtCO2 monitoring. HOB > 30  GI/Nutrition: Diet: Keep NPO for now  ID: Continue Ayvcaz as per ID recs form [facility]. Repeat blood cultures. Trend LA. ID consult at [facility]   Renal: Avoid nephrotoxic agents. Strict I&O  Endocrinology:  Maintain Blood sugar < 180  Haem/Oncology:  DVT ppx w/ SCDs, INR was initially supra therapeutic ~ 9.5 which came down to 2.5 after Vit K   Code status: DNR/DNI. Consult palliative care   Line/tubes/ drains: Arthur (from [facility])     Critical Care time: 35 minutes assessing presenting problems of acute illness that poses high probability of life threatening deterioration or end organ damage/dysfunction.  Medical decision making including Initiating plan of care, reviewing data, reviewing radiology, discussing with multidisciplinary team, non inclusive of procedures     46F w/ PMHx TBI, functional paraplegia, W/C bound at baseline, C spine fixation, s/p trach/ PEG (now decannulated), seizure d/o, urinary retention w/ indwelling arthur, DVT on Coumadin, ESBL E Coli bacteriemia was sent over from NH to [facility] w/ AMS and now transferred to [facility] for cvEEG    r/o non convulsive status epilepticus  ESBL E Coli bacteriemia   LUE swollen ? infiltration     Neuro: Responsive only to noxious stimuli. Loaded w/ Keppra and VA at [facility], continue both meds for now. CTH x 3 were grossly normal. EEG w/ B/L GPEDs and neurology was concerned for a catastrophic neurological event given her clinical condition, cvEEG requested. Will need NICU/ epilepsy input   Cardiovascular: Aim for MAP target 65. No pressors requirements at this time  Resp/Chest: Maintain SpO2 > 92%. Oxygenation well on RA. Aspiration precautions. EtCO2 monitoring. HOB > 30  GI/Nutrition: Diet: Keep NPO for now  ID: Continue Ayvcaz as per ID recs form [facility]. Repeat blood cultures. Trend LA. ID consult at [facility]   Renal: Avoid nephrotoxic agents. Strict I&O  Endocrinology:  Maintain Blood sugar < 180  Haem/Oncology:  DVT ppx w/ SCDs, INR was initially supra therapeutic ~ 9.5 which came down to 2.5 after Vit K   Code status: DNR/DNI. Consult palliative care   Line/tubes/ drains: Arthur 10/29 (from [facility])     Critical Care time: 35 minutes assessing presenting problems of acute illness that poses high probability of life threatening deterioration or end organ damage/dysfunction.  Medical decision making including Initiating plan of care, reviewing data, reviewing radiology, discussing with multidisciplinary team, non inclusive of procedures

## 2021-10-30 NOTE — CONSULT NOTE ADULT - SUBJECTIVE AND OBJECTIVE BOX
INFECTIOUS DISEASES AND INTERNAL MEDICINE at [place]  =======================================================  Wan Raya MD  Diplomates American Board of Internal Medicine and Infectious Diseases  Telephone [phone]  Fax            [phone]  =======================================================    RASHAUN [id number]      HPI:  46F w/ PMHx TBI, functional paraplegia, W/C bound at baseline, C spine fixation, s/p trach/ PEG (now decannulated), seizure d/o, urinary retention w/ indwelling arthur, DVT on Coumadin, ESBL E Coli bacteriemia was sent over from NH to [facility] w/ AMS and now transferred to [facility] for cvEEG. On presentation to [facility] she was altered, hypotensive, febrile 101 and in DIEUDONNE w/ a supra therapeutic INR ~ 9.5. Code stroke was called and stat CTH was grossly normal. Later a code sepsis was called and pt was resuscitated and given IV Abx. Her neuro status further worsened, and she became unresponsive to noxious stimuli. Not intubated as he was a DNR/DNI. Pt was loaded w/ Keppra and another CTH was requested which was also WNLs. A 3rd CTH was obtained 6hrs later which was also normal.   Over the next 24hrs she was on and off pressors and weaned of VM to RA. INR came down to 2.7 after Vit K. EEG was suggestive of a catastrophic neurological insult w/ B/L GPEDs. BCx were positive for ESBL E Coli   ASKED TO EVALUATE FROM ID STANDPOINT       PAST MEDICAL & SURGICAL HISTORY:  TBI (traumatic brain injury)    History of paraplegia    DVT, lower extremity    Chronic neck pain with history of cervical spinal surgery    History of Lorenza-en-Y gastric bypass        ANTIBIOTICS  ceftazidime/avibactam IVPB 2.5 Gram(s) IV Intermittent every 8 hours      Allergies    No Known Allergies    Intolerances        SOCIAL HISTORY:     FAMILY HX   FAMILY HISTORY:  FH: stroke  dad    FH: diabetes mellitus  mom    Family history of hepatitis C  mom        Vital Signs Last 24 Hrs  T(C): 36.6 (30 Oct 2021 07:54), Max: 37 (30 Oct 2021 05:00)  T(F): 97.9 (30 Oct 2021 07:54), Max: 98.6 (30 Oct 2021 05:00)  HR: 99 (30 Oct 2021 10:00) (90 - 101)  BP: 116/89 (30 Oct 2021 10:00) (116/89 - 145/86)  BP(mean): 99 (30 Oct 2021 10:00) (89 - 104)  RR: 19 (30 Oct 2021 10:00) (12 - 19)  SpO2: 99% (30 Oct 2021 10:00) (99% - 100%)  Drug Dosing Weight  Height (cm): 170.18 (18 Dec 2019 23:14)  Weight (kg): 97.8 (30 Oct 2021 05:00)  BMI (kg/m2): 33.8 (30 Oct 2021 05:00)  BSA (m2): 2.09 (30 Oct 2021 05:00)      REVIEW OF SYSTEMS:      UNABLE TO OBTAIN               PHYSICAL EXAMINATION:    GENERAL: The patient is OBTUNDED      VITAL SIGNS: T(C): 36.6 (10-30-21 @ 07:54), Max: 37 (10-30-21 @ 05:00)  HR: 99 (10-30-21 @ 10:00) (90 - 101)  BP: 116/89 (10-30-21 @ 10:00) (116/89 - 145/86)  RR: 19 (10-30-21 @ 10:00) (12 - 19)  SpO2: 99% (10-30-21 @ 10:00) (99% - 100%)  Wt(kg): --    HEENT: Head is normocephalic and atraumatic.  ANICTERIC  NECK: Supple. No carotid bruits.  No lymphadenopathy or thyromegaly.    LUNGS:COARSE BREATH SOUNDS    HEART: Regular rate and rhythm without murmur.    ABDOMEN: Soft, nontender, and nondistended.  Positive bowel sounds.  No hepatosplenomegaly was noted. NO REBOUND NO GUARDING    EXTREMITIES: NO EDEMA NO ERYTHEMA    NEUROLOGIC: OBTUNDED      SKIN: No ulceration or induration present. NO RASH        BLOOD CULTURES  ESBL ECOLI SENS PENDING     URINE CX          LABS:                        13.6   20.96 )-----------( 185      ( 30 Oct 2021 07:08 )             43.3     10-30    144  |  107  |  26.6<H>  ----------------------------<  86  3.8   |  22.0  |  1.44<H>    Ca    8.6      30 Oct 2021 07:08  Phos  2.6     10-30  Mg     2.3     10-30    TPro  6.5<L>  /  Alb  2.6<L>  /  TBili  0.3<L>  /  DBili  x   /  AST  31  /  ALT  22  /  AlkPhos  118  10-30    PT/INR - ( 30 Oct 2021 07:08 )   PT: 16.0 sec;   INR: 1.40 ratio         PTT - ( 30 Oct 2021 07:08 )  PTT:36.0 sec      RADIOLOGY & ADDITIONAL STUDIES:      ASSESSMENT/PLAN  46F w/ PMHx TBI, functional paraplegia, W/C bound at baseline, C spine fixation, s/p trach/ PEG (now decannulated), seizure d/o, urinary retention w/ indwelling arthur, DVT on Coumadin, ESBL E Coli bacteriemia was sent over from NH to [facility] w/ AMS and now transferred to [facility] for cvEEG. On presentation to [facility] she was altered, hypotensive, febrile 101 and in DIEUDONNE w/ a supra therapeutic INR ~ 9.5. Code stroke was called and stat CTH was grossly normal. Later a code sepsis was called and pt was resuscitated and given IV Abx. Her neuro status further worsened, and she became unresponsive to noxious stimuli. Not intubated as he was a DNR/DNI. Pt was loaded w/ Keppra and another CTH was requested which was also WNLs. A 3rd CTH was obtained 6hrs later which was also normal.   Over the next 24hrs she was on and off pressors and weaned of VM to RA. INR came down to 2.7 after Vit K. EEG was suggestive of a catastrophic neurological insult w/ B/L GPEDs. BCx were positive for ESBL E Coli   I CALLED [facility] AND ESBL SENS WILL BE AVAILABLE IN AM  HAD ESBL URINE IN PAST   SUGGEST CHANGE TO MERREM AS ESBL IN GENERAL SENS TO MERREM  REPEAT BLOOD CX ARE PENDING  WILL FOLLOW UP WITH FURTHER RECOMMENDATIONS                EMMA GARCIA MD

## 2021-10-30 NOTE — H&P ADULT - HISTORY OF PRESENT ILLNESS
46F w/ PMHx TBI, functional paraplegia, C spine fixation, s/p trach/ PEG (now decannulated), seizure d/o, urinary retention w/ indwelling arthur, DVT on Coumadin, ESBL E Coli bacteriemia was sent over from NH to [facility] w/ AMS and now transferred to [facility] for cvEEG. On presentation to [facility] she was altered, hypotensive, febrile 101 and in DIEUDONNE w/ a supra therapetic INR ~ 9.5. Code stroke was called and stat CTH was grossly normal. Later a code sepsis was called and pt was resuscitated and given IV Abx. Her neuro status further worsened, and she became unresponsive to noxious stimuli. Not intubated as he was a DNR/DNI. Pt was loaded w/ Keppra and another CTH was requested which was also WNLs. A 3rd CTH was obtained 6hrs later which was also normal.   Over the next 24hrs she was on and off pressors and weaned of VM to RA. INR came down to 2.7 after Vit K. EEG was suggestive of a catastrophic neurological insult w/ B/L GPEDs. BCx were positive for ESBL E Coli and Meropenem was switched to Ayvcaz as per ID recs. Pt was loaded w/ VA 2g prior to transfer to [facility]  46F w/ PMHx TBI, functional paraplegia, W/C bound at baseline, C spine fixation, s/p trach/ PEG (now decannulated), seizure d/o, urinary retention w/ indwelling arthur, DVT on Coumadin, ESBL E Coli bacteriemia was sent over from NH to [facility] w/ AMS and now transferred to [facility] for cvEEG. On presentation to [facility] she was altered, hypotensive, febrile 101 and in DIEUDONNE w/ a supra therapeutic INR ~ 9.5. Code stroke was called and stat CTH was grossly normal. Later a code sepsis was called and pt was resuscitated and given IV Abx. Her neuro status further worsened, and she became unresponsive to noxious stimuli. Not intubated as he was a DNR/DNI. Pt was loaded w/ Keppra and another CTH was requested which was also WNLs. A 3rd CTH was obtained 6hrs later which was also normal.   Over the next 24hrs she was on and off pressors and weaned of VM to RA. INR came down to 2.7 after Vit K. EEG was suggestive of a catastrophic neurological insult w/ B/L GPEDs. BCx were positive for ESBL E Coli and Meropenem was switched to Ayvcaz as per ID recs. Pt was loaded w/ VA 2g prior to transfer to [facility]

## 2021-10-31 LAB
ALBUMIN SERPL ELPH-MCNC: 2.8 G/DL — LOW (ref 3.3–5.2)
ALP SERPL-CCNC: 110 U/L — SIGNIFICANT CHANGE UP (ref 40–120)
ALT FLD-CCNC: 18 U/L — SIGNIFICANT CHANGE UP
ANION GAP SERPL CALC-SCNC: 19 MMOL/L — HIGH (ref 5–17)
APTT BLD: 113.3 SEC — HIGH (ref 27.5–35.5)
APTT BLD: 55.9 SEC — HIGH (ref 27.5–35.5)
APTT BLD: 67.7 SEC — HIGH (ref 27.5–35.5)
AST SERPL-CCNC: 18 U/L — SIGNIFICANT CHANGE UP
BASOPHILS # BLD AUTO: 0 K/UL — SIGNIFICANT CHANGE UP (ref 0–0.2)
BASOPHILS NFR BLD AUTO: 0 % — SIGNIFICANT CHANGE UP (ref 0–2)
BILIRUB SERPL-MCNC: 0.3 MG/DL — LOW (ref 0.4–2)
BUN SERPL-MCNC: 28.5 MG/DL — HIGH (ref 8–20)
CALCIUM SERPL-MCNC: 8.5 MG/DL — LOW (ref 8.6–10.2)
CHLORIDE SERPL-SCNC: 107 MMOL/L — SIGNIFICANT CHANGE UP (ref 98–107)
CO2 SERPL-SCNC: 21 MMOL/L — LOW (ref 22–29)
COVID-19 SPIKE DOMAIN AB INTERP: POSITIVE
COVID-19 SPIKE DOMAIN ANTIBODY RESULT: >250 U/ML — HIGH
CREAT SERPL-MCNC: 1.59 MG/DL — HIGH (ref 0.5–1.3)
CSF PCR RESULT: SIGNIFICANT CHANGE UP
EOSINOPHIL # BLD AUTO: 0 K/UL — SIGNIFICANT CHANGE UP (ref 0–0.5)
EOSINOPHIL NFR BLD AUTO: 0 % — SIGNIFICANT CHANGE UP (ref 0–6)
GLUCOSE SERPL-MCNC: 89 MG/DL — SIGNIFICANT CHANGE UP (ref 70–99)
HCT VFR BLD CALC: 40.2 % — SIGNIFICANT CHANGE UP (ref 34.5–45)
HGB BLD-MCNC: 12.7 G/DL — SIGNIFICANT CHANGE UP (ref 11.5–15.5)
INR BLD: 1.22 RATIO — HIGH (ref 0.88–1.16)
LABORATORY COMMENT REPORT: SIGNIFICANT CHANGE UP
LYMPHOCYTES # BLD AUTO: 0.98 K/UL — LOW (ref 1–3.3)
LYMPHOCYTES # BLD AUTO: 5.3 % — LOW (ref 13–44)
MAGNESIUM SERPL-MCNC: 2.3 MG/DL — SIGNIFICANT CHANGE UP (ref 1.6–2.6)
MANUAL SMEAR VERIFICATION: SIGNIFICANT CHANGE UP
MCHC RBC-ENTMCNC: 27 PG — SIGNIFICANT CHANGE UP (ref 27–34)
MCHC RBC-ENTMCNC: 31.6 GM/DL — LOW (ref 32–36)
MCV RBC AUTO: 85.4 FL — SIGNIFICANT CHANGE UP (ref 80–100)
MONOCYTES # BLD AUTO: 0.33 K/UL — SIGNIFICANT CHANGE UP (ref 0–0.9)
MONOCYTES NFR BLD AUTO: 1.8 % — LOW (ref 2–14)
NEUTROPHILS # BLD AUTO: 17.25 K/UL — HIGH (ref 1.8–7.4)
NEUTROPHILS NFR BLD AUTO: 92.9 % — HIGH (ref 43–77)
PHOSPHATE SERPL-MCNC: 2.4 MG/DL — SIGNIFICANT CHANGE UP (ref 2.4–4.7)
PLAT MORPH BLD: NORMAL — SIGNIFICANT CHANGE UP
PLATELET # BLD AUTO: 185 K/UL — SIGNIFICANT CHANGE UP (ref 150–400)
POIKILOCYTOSIS BLD QL AUTO: SIGNIFICANT CHANGE UP
POTASSIUM SERPL-MCNC: 3 MMOL/L — LOW (ref 3.5–5.3)
POTASSIUM SERPL-SCNC: 3 MMOL/L — LOW (ref 3.5–5.3)
PROT SERPL-MCNC: 6.4 G/DL — LOW (ref 6.6–8.7)
PROTHROM AB SERPL-ACNC: 14 SEC — HIGH (ref 10.6–13.6)
RBC # BLD: 4.71 M/UL — SIGNIFICANT CHANGE UP (ref 3.8–5.2)
RBC # FLD: 14.7 % — HIGH (ref 10.3–14.5)
RBC BLD AUTO: ABNORMAL
SARS-COV-2 IGG+IGM SERPL QL IA: >250 U/ML — HIGH
SARS-COV-2 IGG+IGM SERPL QL IA: POSITIVE
SODIUM SERPL-SCNC: 147 MMOL/L — HIGH (ref 135–145)
SOURCE HSV 1/2: SIGNIFICANT CHANGE UP
VALPROATE SERPL-MCNC: 6.2 UG/ML — LOW (ref 50–100)
WBC # BLD: 18.57 K/UL — HIGH (ref 3.8–10.5)
WBC # FLD AUTO: 18.57 K/UL — HIGH (ref 3.8–10.5)

## 2021-10-31 PROCEDURE — 99233 SBSQ HOSP IP/OBS HIGH 50: CPT

## 2021-10-31 PROCEDURE — 95720 EEG PHY/QHP EA INCR W/VEEG: CPT

## 2021-10-31 PROCEDURE — 99232 SBSQ HOSP IP/OBS MODERATE 35: CPT

## 2021-10-31 RX ORDER — SODIUM CHLORIDE 9 MG/ML
1000 INJECTION, SOLUTION INTRAVENOUS
Refills: 0 | Status: DISCONTINUED | OUTPATIENT
Start: 2021-10-31 | End: 2021-11-01

## 2021-10-31 RX ORDER — POTASSIUM CHLORIDE 20 MEQ
20 PACKET (EA) ORAL
Refills: 0 | Status: COMPLETED | OUTPATIENT
Start: 2021-10-31 | End: 2021-10-31

## 2021-10-31 RX ORDER — LEVETIRACETAM 250 MG/1
1000 TABLET, FILM COATED ORAL EVERY 12 HOURS
Refills: 0 | Status: DISCONTINUED | OUTPATIENT
Start: 2021-10-31 | End: 2021-11-01

## 2021-10-31 RX ADMIN — Medication 1 APPLICATION(S): at 11:55

## 2021-10-31 RX ADMIN — LEVETIRACETAM 400 MILLIGRAM(S): 250 TABLET, FILM COATED ORAL at 17:22

## 2021-10-31 RX ADMIN — Medication 1 APPLICATION(S): at 23:39

## 2021-10-31 RX ADMIN — Medication 27.5 MILLIGRAM(S): at 05:40

## 2021-10-31 RX ADMIN — SODIUM CHLORIDE 100 MILLILITER(S): 9 INJECTION, SOLUTION INTRAVENOUS at 05:36

## 2021-10-31 RX ADMIN — Medication 27.5 MILLIGRAM(S): at 18:32

## 2021-10-31 RX ADMIN — Medication 50 MILLIEQUIVALENT(S): at 22:22

## 2021-10-31 RX ADMIN — MEROPENEM 100 MILLIGRAM(S): 1 INJECTION INTRAVENOUS at 17:23

## 2021-10-31 RX ADMIN — MEROPENEM 100 MILLIGRAM(S): 1 INJECTION INTRAVENOUS at 05:36

## 2021-10-31 RX ADMIN — HEPARIN SODIUM 1800 UNIT(S)/HR: 5000 INJECTION INTRAVENOUS; SUBCUTANEOUS at 05:37

## 2021-10-31 RX ADMIN — CHLORHEXIDINE GLUCONATE 1 APPLICATION(S): 213 SOLUTION TOPICAL at 05:40

## 2021-10-31 RX ADMIN — HEPARIN SODIUM 1800 UNIT(S)/HR: 5000 INJECTION INTRAVENOUS; SUBCUTANEOUS at 22:01

## 2021-10-31 RX ADMIN — LEVETIRACETAM 400 MILLIGRAM(S): 250 TABLET, FILM COATED ORAL at 08:41

## 2021-10-31 RX ADMIN — HEPARIN SODIUM 1600 UNIT(S)/HR: 5000 INJECTION INTRAVENOUS; SUBCUTANEOUS at 14:13

## 2021-10-31 RX ADMIN — PANTOPRAZOLE SODIUM 40 MILLIGRAM(S): 20 TABLET, DELAYED RELEASE ORAL at 11:54

## 2021-10-31 RX ADMIN — Medication 50 MILLIEQUIVALENT(S): at 18:06

## 2021-10-31 RX ADMIN — Medication 1 APPLICATION(S): at 17:23

## 2021-10-31 RX ADMIN — Medication 50 MILLIEQUIVALENT(S): at 16:14

## 2021-10-31 NOTE — PROGRESS NOTE ADULT - SUBJECTIVE AND OBJECTIVE BOX
[facility]                                        Neurology at [place]                                 [facility]                                  [address]                                        [place], [place], [address]                                             [phone]        CC: seizure     HPI:   The patient is a 46y Female with a history of traumatic Brain Injury and paraplegia who is wheelchair bound at baseline. She had tracheostomy which was decannulated and PEG.   At baseline she reportedly is able to speak/converse.   She was sent from her Nursing Home to [facility] secondary to altered mental status.   She was reportedly noted to be hypotensive, febrile, and became unresponsive.   Code stroke called initially and later code sepsis called.   EEG at [facility] reportedly showed bilateral GPEDs.   The patient was transferred to [facility] for c-EEG.     She has reportedly been on Keppra 500 mg twice per day at the nursing home.   She was loaded with Keppra and started on 1500 mg twice per day here.     Interim history:  Remains in ICU now downgraded awaiting medical bed.    ROS:   Denies headache or dizziness.  Denies chest pain.  Denies shortness of breath.    MEDICATIONS  (STANDING):  chlorhexidine 4% Liquid 1 Application(s) Topical <User Schedule>  heparin  Infusion.  Unit(s)/Hr (18 mL/Hr) IV Continuous <Continuous>  levETIRAcetam  IVPB 1500 milliGRAM(s) IV Intermittent every 12 hours  meropenem  IVPB 1000 milliGRAM(s) IV Intermittent every 12 hours  multiple electrolytes Injection Type 1 1000 milliLiter(s) (100 mL/Hr) IV Continuous <Continuous>  pantoprazole  Injectable 40 milliGRAM(s) IV Push daily  petrolatum Ophthalmic Ointment 1 Application(s) Both EYES every 6 hours  valproate sodium IVPB 500 milliGRAM(s) IV Intermittent every 12 hours      Vital Signs Last 24 Hrs  T(C): 36.7 (31 Oct 2021 12:00), Max: 37 (30 Oct 2021 23:00)  T(F): 98 (31 Oct 2021 12:00), Max: 98.6 (30 Oct 2021 23:00)  HR: 70 (31 Oct 2021 12:00) (68 - 110)  BP: 127/67 (31 Oct 2021 12:00) (127/67 - 160/74)  BP(mean): 86 (31 Oct 2021 12:00) (82 - 111)  RR: 20 (31 Oct 2021 12:00) (14 - 21)  SpO2: 99% (31 Oct 2021 12:00) (95% - 100%)    Detailed Neurologic Exam:    Mental status: The patient is awake more alert. She answers questions and follows simple instructions.     Cranial nerves: Pupils equal and react symmetrically to light. There is no visual field deficit to threat. Extraocular motion is full with no nystagmus. Facial sensation is intact. Facial musculature is symmetric. Palate elevates symmetrically. Tongue is midline.    Motor:   Moves upper extremities to command. 4/5 left and 4-/5 right.   No movement of lower extremities.     Sensation: Decreased to all modalities below waist.    Reflexes: 2+ throughout and plantar responses are extensor.    Cerebellar: Difficult to assess.    Labs:     10-31    147<H>  |  107  |  28.5<H>  ----------------------------<  89  3.0<L>   |  21.0<L>  |  1.59<H>    Ca    8.5<L>      31 Oct 2021 04:17  Phos  2.4     10-31  Mg     2.3     10-31    TPro  6.4<L>  /  Alb  2.8<L>  /  TBili  0.3<L>  /  DBili  x   /  AST  18  /  ALT  18  /  AlkPhos  110  10-31                            12.7   18.57 )-----------( 185      ( 31 Oct 2021 04:17 )             40.2       EEG with bilateral GPDs now with triphasic morphology more consistent with metabolic etiology.

## 2021-10-31 NOTE — CHART NOTE - NSCHARTNOTEFT_GEN_A_CORE
46F w/ PMHx TBI, functional paraplegia, W/C bound at baseline, C spine fixation, s/p trach/ PEG (now decannulated), seizure d/o, urinary retention w/ indwelling arthur, DVT on Coumadin, ESBL E Coli bacteriemia was [place] [facility] [place] and now transferred to [facility] for cvEEG    Patient w/ESBL bacteremia, on Merrem   C/w Depacon 500 q12h  C/w Keppra 1500 q12h   C/w Heparin gtt for hx of DVT  Patient now more alert than prior  DNR/DNI  Epilepsy & ID on board     Transfer patient to medicine service, discussed case w/nocturnist, Dr. Zhang

## 2021-10-31 NOTE — EEG REPORT - NS EEG TEXT BOX
[facility]   COMPREHENSIVE EPILEPSY CENTER   REPORT OF LONG-TERM VIDEO EEG     [facility]: [address], [place] [address], Ph#: [phone]  [facility]: [phone] [place] [address], Ph#: [phone]  [facility]: [address] [place] [address], Ph#: [phone]    Patient Name: RASHAUN FLORES  Age and : 46y ([birth year])  MRN #: [id number]  Location: [address] [facility]  Referring Physician: Maryam Remy    Start Time/Date: 11:57 on 10-30-21  End Time/Date: 08:00 on 10-31-21  Duration: 20 Hours     _____________________________________________________________  STUDY INFORMATION    EEG Recording Technique:  The patient underwent continuous Video-EEG monitoring, using Telemetry System hardware on the XLTek Digital System. EEG and video data were stored on a computer hard drive with important events saved in digital archive files. The material was reviewed by a physician (electroencephalographer / epileptologist) on a daily basis. Olu and seizure detection algorithms were utilized and reviewed. An EEG Technician attended to the patient, and was available throughout daytime work hours.  The epilepsy center neurologist was available in person or on call 24-hours per day.    EEG Placement and Labeling of Electrodes:  The EEG was performed utilizing 20 channel referential EEG connections (coronal over temporal over parasagittal montage) using all standard 10-20 electrode placements with EKG, with additional electrodes placed in the inferior temporal region using the modified 10-10 montage electrode placements for elective admissions, or if deemed necessary. Recording was at a sampling rate of 256 samples per second per channel. Time synchronized digital video recording was done simultaneously with EEG recording. A low light infrared camera was used for low light recording.     _____________________________________________________________  HISTORY    Patient is a 46y old  Female who presents with a chief complaint of Transfer for EEG (31 Oct 2021 01:34)    PERTINENT MEDICATION:  levETIRAcetam  IVPB 1500 milliGRAM(s) IV Intermittent every 12 hours  valproate sodium IVPB 500 milliGRAM(s) IV Intermittent every 12 hours    _____________________________________________________________  STUDY INTERPRETATION    Findings: The background was continuous and reactive however with diffuse theta and polymorphic delta slowing that appeared to relatively improve after 01:20 in terms of frequency and prevalence, with further improvement of background rhythm after 06:15, heralded by the emergence of fragments of a poorly-modulated posterior dominant rhythm (PDR) at 7Hz.    Focal Slowing:   None were present.    Sleep Background:  Drowsiness and stage II sleep transients were not recorded.    Other Non-Epileptiform Findings:  None were present.    Interictal Epileptiform Activity:   Abundant generalized periodic discharges (GPDs) at 1-1.5 Hz, with shifting right/left asymmetries and at times with triphasic morphology.  Frequent left temporal sharp-waves, max F7/T7.  Rare right temporal sharp waves, max F8/T8.    Events:  Clinical events: None recorded.  Seizures: None recorded.    Activation Procedures:   Hyperventilation was not performed.    Photic stimulation was not performed.     Artifacts:  Intermittent myogenic and movement artifacts were noted.    ECG:  The heart rate on single channel ECG was predominantly between 60-70 BPM.    _____________________________________________________________  EEG SUMMARY/CLASSIFICATION    Abnormal EEG in an encephalopathic patient.    - Abundant generalized periodic discharges (GPDs) at 1-1.5 Hz, with shifting right/left asymmetries and at times with triphasic morphology.  - Frequent left temporal sharp-waves, max F7/T7.  - Rare right temporal sharp waves, max F8/T8.  - Continuous polymorphic theta and delta range background slowing.    _____________________________________________________________  EEG IMPRESSION/CLINICAL CORRELATE    Abnormal EEG study.    - There is risk of focal onset seizures from L > R temporal regions.    - GPDs with triphasic morphology may be associated with an increased risk for generalized-onset seizure, but are typically seen in the context of a relatively severe metabolic encephalopathic process.    - Moderate nonspecific diffuse or multifocal cerebral dysfunction.   - No seizure seen.    *** PRELIMINARY REPORT - PENDING EPILEPSY ATTENDING REVIEW ***  _____________________________________________________________    Caleb Leon MD, EMMY  Fellow, [facility] Epilepsy [place]     [facility]   COMPREHENSIVE EPILEPSY CENTER   REPORT OF LONG-TERM VIDEO EEG     [facility]: [address], [place] [address], Ph#: [phone]  [facility]: [phone] [place] [address], Ph#: [phone]  [facility]: [address] [place] [address], Ph#: [phone]    Patient Name: RASHAUN FLORES  Age and : 46y ([birth year])  MRN #: [id number]  Location: [address]  Referring Physician: Maryam Remy    Start Time/Date: 11:57 on 10-30-21  End Time/Date: 08:00 on 10-31-21  Duration: 20 Hours     _____________________________________________________________  STUDY INFORMATION    EEG Recording Technique:  The patient underwent continuous Video-EEG monitoring, using Telemetry System hardware on the XLTek Digital System. EEG and video data were stored on a computer hard drive with important events saved in digital archive files. The material was reviewed by a physician (electroencephalographer / epileptologist) on a daily basis. Olu and seizure detection algorithms were utilized and reviewed. An EEG Technician attended to the patient, and was available throughout daytime work hours.  The epilepsy center neurologist was available in person or on call 24-hours per day.    EEG Placement and Labeling of Electrodes:  The EEG was performed utilizing 20 channel referential EEG connections (coronal over temporal over parasagittal montage) using all standard 10-20 electrode placements with EKG, with additional electrodes placed in the inferior temporal region using the modified 10-10 montage electrode placements for elective admissions, or if deemed necessary. Recording was at a sampling rate of 256 samples per second per channel. Time synchronized digital video recording was done simultaneously with EEG recording. A low light infrared camera was used for low light recording.     _____________________________________________________________  HISTORY    Patient is a 46y old  Female who presents with a chief complaint of Transfer for EEG (31 Oct 2021 01:34)    PERTINENT MEDICATION:  levETIRAcetam  IVPB 1500 milliGRAM(s) IV Intermittent every 12 hours  valproate sodium IVPB 500 milliGRAM(s) IV Intermittent every 12 hours    _____________________________________________________________  STUDY INTERPRETATION    Findings: The background was continuous and reactive however with diffuse theta and polymorphic delta slowing that appeared to relatively improve after 01:20 in terms of frequency and prevalence, with further improvement of background rhythm after 06:15, heralded by the emergence of fragments of a poorly-modulated posterior dominant rhythm (PDR) at 7Hz.    Focal Slowing:   None were present.    Sleep Background:  Drowsiness and stage II sleep transients were not recorded.    Other Non-Epileptiform Findings:  None were present.    Interictal Epileptiform Activity:   Abundant generalized periodic discharges (GPDs) at 1-1.5 Hz, with shifting right/left asymmetries and at times with triphasic morphology.  Frequent left temporal sharp-waves, max F7/T7.  Rare right temporal sharp waves, max F8/T8.    Events:  Clinical events: None recorded.  Seizures: None recorded.    Activation Procedures:   Hyperventilation was not performed.    Photic stimulation was not performed.     Artifacts:  Intermittent myogenic and movement artifacts were noted.    ECG:  The heart rate on single channel ECG was predominantly between 60-70 BPM.    _____________________________________________________________  EEG SUMMARY/CLASSIFICATION    Abnormal EEG in an encephalopathic patient.    - Abundant generalized periodic discharges (GPDs) at 1-1.5 Hz, with shifting right/left asymmetries and at times with triphasic morphology.  - Frequent left temporal sharp-waves, max F7/T7.  - Rare right temporal sharp waves, max F8/T8.  - Continuous polymorphic theta and delta range background slowing.    _____________________________________________________________  EEG IMPRESSION/CLINICAL CORRELATE    Abnormal EEG study.    - There is risk of focal onset seizures from L > R temporal regions.    - GPDs with triphasic morphology may be associated with an increased risk for generalized-onset seizure, but are typically seen in the context of a relatively severe metabolic encephalopathic process.    - Moderate nonspecific diffuse or multifocal cerebral dysfunction.   - No seizure seen.    Overall, the EEG background appeared to be improving in last quarter of the recording.    *** PRELIMINARY REPORT - PENDING EPILEPSY ATTENDING REVIEW ***  _____________________________________________________________    Caleb Leon MD, EMMY  Fellow, [facility] Epilepsy [facility]     [facility]   COMPREHENSIVE EPILEPSY CENTER   REPORT OF LONG-TERM VIDEO EEG     [facility]: [address], [place] [address], Ph#: [phone]  [facility]: [phone] [place] [address], Ph#: [phone]  [facility]: [address] [place] [address], Ph#: [phone]    Patient Name: RASHAUN FLORES  Age and : 46y ([birth year])  MRN #: [id number]  Location: [address]  Referring Physician: Maryam Remy    Start Time/Date: 11:57 on 10-30-21  End Time/Date: 08:00 on 10-31-21  Duration: 20 Hours     _____________________________________________________________  STUDY INFORMATION    EEG Recording Technique:  The patient underwent continuous Video-EEG monitoring, using Telemetry System hardware on the XLTek Digital System. EEG and video data were stored on a computer hard drive with important events saved in digital archive files. The material was reviewed by a physician (electroencephalographer / epileptologist) on a daily basis. Olu and seizure detection algorithms were utilized and reviewed. An EEG Technician attended to the patient, and was available throughout daytime work hours.  The epilepsy center neurologist was available in person or on call 24-hours per day.    EEG Placement and Labeling of Electrodes:  The EEG was performed utilizing 20 channel referential EEG connections (coronal over temporal over parasagittal montage) using all standard 10-20 electrode placements with EKG, with additional electrodes placed in the inferior temporal region using the modified 10-10 montage electrode placements for elective admissions, or if deemed necessary. Recording was at a sampling rate of 256 samples per second per channel. Time synchronized digital video recording was done simultaneously with EEG recording. A low light infrared camera was used for low light recording.     _____________________________________________________________  HISTORY    Patient is a 46y old  Female who presents with a chief complaint of Transfer for EEG (31 Oct 2021 01:34)    PERTINENT MEDICATION:  levETIRAcetam  IVPB 1500 milliGRAM(s) IV Intermittent every 12 hours  valproate sodium IVPB 500 milliGRAM(s) IV Intermittent every 12 hours    _____________________________________________________________  STUDY INTERPRETATION    Findings: The background was continuous and reactive however with diffuse theta and polymorphic delta slowing that appeared to relatively improve after 01:20 in terms of frequency and prevalence, with further improvement of background rhythm after 06:15, heralded by the emergence of fragments of a poorly-modulated posterior dominant rhythm (PDR) at 7Hz.    Focal Slowing:   None were present.    Sleep Background:  Drowsiness and stage II sleep transients were not recorded.    Other Non-Epileptiform Findings:  None were present.    Interictal Epileptiform Activity:   Abundant generalized periodic discharges (GPDs) at 1-1.5 Hz, with shifting right/left asymmetries and at times with triphasic morphology.  Frequent left temporal sharp-waves, max F7/T7.  Rare right temporal sharp waves, max F8/T8.    Events:  Clinical events: None recorded.  Seizures: None recorded.    Activation Procedures:   Hyperventilation was not performed.    Photic stimulation was not performed.     Artifacts:  Intermittent myogenic and movement artifacts were noted.    ECG:  The heart rate on single channel ECG was predominantly between 60-70 BPM.    _____________________________________________________________  EEG SUMMARY/CLASSIFICATION    Abnormal EEG in an encephalopathic patient.    - Abundant generalized periodic discharges (GPDs) at 1-1.5 Hz, with shifting right/left asymmetries and at times with triphasic morphology.  - Frequent left temporal sharp-waves, max F7/T7.  - Rare right temporal sharp waves, max F8/T8.  - Continuous polymorphic theta and delta range background slowing.    _____________________________________________________________  EEG IMPRESSION/CLINICAL CORRELATE    Abnormal EEG study.    - There is risk of focal onset seizures from L > R temporal regions.    - GPDs with triphasic morphology may be associated with an increased risk for generalized-onset seizure, but are typically seen in the context of a relatively severe metabolic encephalopathic process.    - Moderate nonspecific diffuse or multifocal cerebral dysfunction.   - No seizure seen.    Overall, the EEG background appeared to be improving in last quarter of the recording.      _____________________________________________________________    Caleb Leon MD, EMMY  Fellow, [facility] Epilepsy [place]

## 2021-10-31 NOTE — PROGRESS NOTE ADULT - ASSESSMENT
46y Female with a history of Traumatic Brain Injury now presenting with altered mental status and possible sepsis.     Seizure.  She reportedly is on Keppra at her nursing home.   It is not clear if she had a seizure there, or at [facility].   Will decrease Keppra to 1000 mg twice per day for now.   Continue EEG.     Mental status improved today.    Sepsis.  Seen by ID.   Antibiotics per ICU and ID.

## 2021-10-31 NOTE — PROGRESS NOTE ADULT - ASSESSMENT
46 Female w/ PMHx TBI, functional paraplegia, W/C bound at baseline, C spine fixation, s/p trach/ PEG (now decannulated), seizure d/o, urinary retention w/ indwelling arthur, DVT on Coumadin, ESBL E Coli bacteriemia who was transferred from Nursing Home to [facility] w/ altered mental status and now transferred to [facility] for cvEEG course complicated by ESBL bacteremia again with hemodynamic instability requiring icu level support with pressors now stabilized on Merrem now downgraded to hospitalist service.       #Seizure   Neuro consult appreciated  cvEEG in progress  Epilepsy consult appreciated, pending final read   c/w keppra and depakote   csf studies in lab from [facility]    #ESBL Bacteremia complicated by sepsis  sepsis now resolved   Lactate 1.3  ID consult appreciated  repeat bcx in lab  c/w Merrem   trend fever curve  trend wbcs    #DIEUDONNE  I&O's  avoid nephrotoxic agents  normal Serum Cr in Sept 2021   c/w maintanence ivf   trend bmp    #DVT  patient on heparin ggt  previously supratherapeutic INR from coumadin use.     #Chronic Urinary Retention  arthur in place  replaced on admission   maintaining adequate urine output     GOC: DNR/DNI    Plan of Care discussed with RN at bedside.  46 Female w/ PMHx TBI, functional paraplegia, W/C bound at baseline, C spine fixation, s/p trach/ PEG (now decannulated), seizure d/o, urinary retention w/ indwelling arthur, DVT on Coumadin, ESBL E Coli bacteriemia who was transferred from Nursing Home to [facility] w/ altered mental status and now transferred to [facility] for cvEEG course complicated by ESBL bacteremia again with hemodynamic instability requiring icu level support with pressors now stabilized on Merrem now downgraded to hospitalist service.       #Seizure   Neuro consult appreciated  cvEEG in progress  Epilepsy consult appreciated, pending final read   c/w keppra and depakote   csf studies in lab from [facility]  swallow eval pending, aspiration precautions and npo for now    #ESBL Bacteremia complicated by sepsis  sepsis now resolved   Lactate 1.3  ID consult appreciated  repeat bcx in lab  c/w Merrem   trend fever curve  trend wbcs    #DIEUDONNE  I&O's  avoid nephrotoxic agents  normal Serum Cr in Sept 2021   c/w maintanence ivf   trend bmp    #DVT  patient on heparin ggt  previously supratherapeutic INR from coumadin use.     #Chronic Urinary Retention  arthur in place  replaced on admission   maintaining adequate urine output     GOC: DNR/DNI    Plan of Care discussed with RN at bedside.

## 2021-10-31 NOTE — PROGRESS NOTE ADULT - SUBJECTIVE AND OBJECTIVE BOX
MICU DOWNGRADE NOTE:     CC: Patient is a 46y old  Female who presents with a chief complaint of Transfer for EEG (31 Oct 2021 01:34)    Brief Hospital Course:   46 Female w/ PMHx TBI, functional paraplegia, W/C bound at baseline, C spine fixation, s/p trach/ PEG (now decannulated), seizure d/o, urinary retention w/ indwelling arthur, DVT on Coumadin, ESBL E Coli bacteriemia who was transferred from Nursing Home to [facility] w/ altered mental status and now transferred to [facility] for cvEEG. On her initial presentation to [facility] she was altered, hypotensive, febrile 101 and in DIEUDONNE w/ a supra therapeutic INR ~ 9.5. Code stroke was called at that time, a CT head was noted grossly normal. During course, a code sepsis was called and patient was given IV fluid resuscitation and IV Abx. Her neuro status further worsened, and she became unresponsive to noxious stimuli. Due to pt being DNR/DNI she was not intubated. Pt was loaded w/ Keppra and another CT Head was done and resulted normal. A 3rd CT Head was done 6hrs later which was also normal.   Over the next 24hrs she was on and off pressors and weaned off Venturi to Room Air.  Patient's supratherapeutic INR decreased to 2.7 after Vit K.   EEG was suggestive of a neurological insult cortical irritability w/ B/L GPEDs placing pt at high risk of generalized onset seizures.   BCx were positive for ESBL E Coli for which patient was maintained on Merrem IV.   Neuro and ID services were consulted onto the case.   Patient was hemodynamically stable and downgrade called to hospitalist service.       SUBJECTIVE:  Patient seen and examined at bedside, No acute overnight events. cvEEG in progress.   Patient resting in bed, arthur in place, replaced on admission.   Per nursing, patient's mentation improved greatly compared to initial presentation.   Is able to follow commands and is now AAO to self.      Patient denies any current complaints. Says that she does not remember why she is here. However she states she currently is in [place]  Says that she feels no pain. Says she has inability to feel pain below her chest/breast line.   ROS: Denies fever, chest pain, SOB, abdominal pain, diarrhea, constipation, calf pain.      OBJECTIVE:  VS:   Vital Signs Last 24 Hrs  T(C): 37 (30 Oct 2021 23:00), Max: 37 (30 Oct 2021 05:00)  T(F): 98.6 (30 Oct 2021 23:00), Max: 98.6 (30 Oct 2021 05:00)  HR: 101 (31 Oct 2021 01:00) (90 - 110)  BP: 143/76 (31 Oct 2021 01:00) (116/89 - 160/74)  BP(mean): 96 (31 Oct 2021 01:00) (89 - 111)  RR: 17 (30 Oct 2021 23:00) (12 - 21)  SpO2: 97% (31 Oct 2021 01:00) (95% - 100%)    Physical Exam:   Gen: NAD, laying in icu bed   Eyes: both pupils with sluggish reactivity, right eye pupil more dilated than left.   Mouth: moist mucous membranes.   Neck: Decannulated trach  CVS: RRR, +S1/S2, no murmurs, rubs or gallops appreciated  Pulm: CTAB, no wheeze, rales, rhonchi  GI: +BS, soft, ND, NT.    : arthur in place, clear urine output   Ext: left upper extremity with edema, b/l lower extremities in vcd boots. withdraws upper extremities to noxious stimulus. no withdrawal from b/l lower extremities.   Neuro: AAOx1 to self, confused to place and time and to circumstances. Gait not assessed. Follows commands.   Skin: exposed skin warm, dry, well perfused. reported Stage 2 pressure ulcers over right perianal region 1cm in size.     Labs:                        13.6   20.96 )-----------( 185      ( 30 Oct 2021 07:08 )             43.3   10-30    144  |  107  |  26.6<H>  ----------------------------<  86  3.8   |  22.0  |  1.44<H>    Ca    8.6      30 Oct 2021 07:08  Phos  2.6     10-30  Mg     2.3     10-30    TPro  6.5<L>  /  Alb  2.6<L>  /  TBili  0.3<L>  /  DBili  x   /  AST  31  /  ALT  22  /  AlkPhos  118  10-30      10-29 @ 07:01  -  10-30 @ 07:00  --------------------------------------------------------  IN: 303 mL / OUT: 475 mL / NET: -172 mL    10-30 @ 07:01  -  10-31 @ 02:15  --------------------------------------------------------  IN: 2458 mL / OUT: 3630 mL / NET: -1172 mL        Medications:  MEDICATIONS  (STANDING):  chlorhexidine 4% Liquid 1 Application(s) Topical <User Schedule>  heparin  Infusion.  Unit(s)/Hr (18 mL/Hr) IV Continuous <Continuous>  levETIRAcetam  IVPB 1500 milliGRAM(s) IV Intermittent every 12 hours  meropenem  IVPB 1000 milliGRAM(s) IV Intermittent every 12 hours  multiple electrolytes Injection Type 1 1000 milliLiter(s) (100 mL/Hr) IV Continuous <Continuous>  pantoprazole  Injectable 40 milliGRAM(s) IV Push daily  petrolatum Ophthalmic Ointment 1 Application(s) Both EYES every 6 hours  valproate sodium IVPB 500 milliGRAM(s) IV Intermittent every 12 hours    MEDICATIONS  (PRN):  heparin   Injectable 8000 Unit(s) IV Push every 6 hours PRN For aPTT less than 40  heparin   Injectable 4000 Unit(s) IV Push every 6 hours PRN For aPTT between 40 - 57

## 2021-10-31 NOTE — SWALLOW BEDSIDE ASSESSMENT ADULT - SWALLOW EVAL: DIAGNOSIS
Oral stage marked by increased mastication time, decreased mastication ability and increased A-P transit time of minced and moist consistency. Swallow judged to be functional for puree and thin liquids with no OVERT s/s of aspiration/penetration noted
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
## 2021-10-31 NOTE — SWALLOW BEDSIDE ASSESSMENT ADULT - SLP GENERAL OBSERVATIONS
Pt received awake and alert, upright in bed NAD, s/p decannulation +PEG, pain level 0/10 via verbal pain scale pre and post eval

## 2021-10-31 NOTE — PROGRESS NOTE ADULT - SUBJECTIVE AND OBJECTIVE BOX
INFECTIOUS DISEASES AND INTERNAL MEDICINE at [place]  =======================================================  Wan Raya MD  Diplomates American Board of Internal Medicine and Infectious Diseases  Telephone [phone]  Fax            [phone]  =======================================================    RASHAUN FLORES [id number]    Follow up: AMS  BACTEREMIA ESBL    Allergies:  No Known Allergies      Medications:  chlorhexidine 4% Liquid 1 Application(s) Topical <User Schedule>  heparin   Injectable 8000 Unit(s) IV Push every 6 hours PRN  heparin   Injectable 4000 Unit(s) IV Push every 6 hours PRN  heparin  Infusion.  Unit(s)/Hr IV Continuous <Continuous>  lactated ringers. 1000 milliLiter(s) IV Continuous <Continuous>  levETIRAcetam  IVPB 1000 milliGRAM(s) IV Intermittent every 12 hours  meropenem  IVPB 1000 milliGRAM(s) IV Intermittent every 12 hours  pantoprazole  Injectable 40 milliGRAM(s) IV Push daily  petrolatum Ophthalmic Ointment 1 Application(s) Both EYES every 6 hours  potassium chloride  20 mEq/100 mL IVPB 20 milliEquivalent(s) IV Intermittent every 2 hours  valproate sodium IVPB 500 milliGRAM(s) IV Intermittent every 12 hours    SOCIAL       FAMILY   FAMILY HISTORY:  FH: stroke  dad    FH: diabetes mellitus  mom    Family history of hepatitis C  mom      REVIEW OF SYSTEMS:  CONSTITUTIONAL:  No Fever or chills  HEENT:   No diplopia or blurred vision.  No earache, sore throat or runny nose.  CARDIOVASCULAR:  No pressure, squeezing, strangling, tightness, heaviness or aching about the chest, neck, axilla or epigastrium.  RESPIRATORY:  No cough, shortness of breath, PND or orthopnea.  GASTROINTESTINAL:  No nausea, vomiting or diarrhea.  GENITOURINARY:  No dysuria, frequency or urgency. No Blood in urine  MUSCULOSKELETAL:   moves all joints  SKIN:  No change in skin, hair or nails.  NEUROLOGIC:  MORE AWAKE ANSWERS SIMPLE QUESTIONS         Physical Exam:  ICU Vital Signs Last 24 Hrs  T(C): 36.7 (31 Oct 2021 12:00), Max: 37 (30 Oct 2021 23:00)  T(F): 98 (31 Oct 2021 12:00), Max: 98.6 (30 Oct 2021 23:00)  HR: 69 (31 Oct 2021 14:00) (67 - 110)  BP: 98/66 (31 Oct 2021 14:00) (98/66 - 160/74)  BP(mean): 77 (31 Oct 2021 14:00) (77 - 111)  ABP: --  ABP(mean): --  RR: 20 (31 Oct 2021 12:00) (14 - 21)  SpO2: 96% (31 Oct 2021 14:00) (95% - 100%)    GEN: NAD,  AWAKE  ANSWERS  QUESTIONS   HEENT: normocephalic and atraumatic. EOMI. RAYNE.    NECK: Supple. No carotid bruits.  No lymphadenopathy or thyromegaly.  LUNGS: Clear to auscultation.  HEART: Regular rate and rhythm without murmur.  ABDOMEN: Soft, nontender, and nondistended.  Positive bowel sounds.    : No CVA tenderness  EXTREMITIES: Without any cyanosis, clubbing, rash, lesions or edema.  MSK: no joint swelling  NEUROLOGIC:  PAPRAPLEGIA        Labs:  Vitals:  ============  T(F): 98 (31 Oct 2021 12:00), Max: 98.6 (30 Oct 2021 23:00)  HR: 69 (31 Oct 2021 14:00)  BP: 98/66 (31 Oct 2021 14:00)  RR: 20 (31 Oct 2021 12:00)  SpO2: 96% (31 Oct 2021 14:00) (95% - 100%)  temp max in last 48H T(F): , Max: 98.6 (10-30-21 @ 05:00)    =======================================================  Current Antibiotics:  meropenem  IVPB 1000 milliGRAM(s) IV Intermittent every 12 hours    Other medications:  chlorhexidine 4% Liquid 1 Application(s) Topical <User Schedule>  heparin  Infusion.  Unit(s)/Hr IV Continuous <Continuous>  lactated ringers. 1000 milliLiter(s) IV Continuous <Continuous>  levETIRAcetam  IVPB 1000 milliGRAM(s) IV Intermittent every 12 hours  pantoprazole  Injectable 40 milliGRAM(s) IV Push daily  petrolatum Ophthalmic Ointment 1 Application(s) Both EYES every 6 hours  potassium chloride  20 mEq/100 mL IVPB 20 milliEquivalent(s) IV Intermittent every 2 hours  valproate sodium IVPB 500 milliGRAM(s) IV Intermittent every 12 hours      =======================================================  Labs:                        12.7   18.57 )-----------( 185      ( 31 Oct 2021 04:17 )             40.2     10-31    147<H>  |  107  |  28.5<H>  ----------------------------<  89  3.0<L>   |  21.0<L>  |  1.59<H>    Ca    8.5<L>      31 Oct 2021 04:17  Phos  2.4     10-31  Mg     2.3     10-31    TPro  6.4<L>  /  Alb  2.8<L>  /  TBili  0.3<L>  /  DBili  x   /  AST  18  /  ALT  18  /  AlkPhos  110  10-31      Creatinine, Serum: 1.59 mg/dL (10-31-21 @ 04:17)  Creatinine, Serum: 1.44 mg/dL (10-30-21 @ 07:08)            WBC Count: 18.57 K/uL (10-31-21 @ 04:17)  WBC Count: 20.96 K/uL (10-30-21 @ 07:08)          Alkaline Phosphatase, Serum: 110 U/L (10-31-21 @ 04:17)  Alkaline Phosphatase, Serum: 118 U/L (10-30-21 @ 07:08)  Alanine Aminotransferase (ALT/SGPT): 18 U/L (10-31-21 @ 04:17)  Alanine Aminotransferase (ALT/SGPT): 22 U/L (10-30-21 @ 07:08)  Aspartate Aminotransferase (AST/SGOT): 18 U/L (10-31-21 @ 04:17)  Aspartate Aminotransferase (AST/SGOT): 31 U/L (10-30-21 @ 07:08)  Bilirubin Total, Serum: 0.3 mg/dL (10-31-21 @ 04:17)  Bilirubin Total, Serum: 0.3 mg/dL (10-30-21 @ 07:08)

## 2021-11-01 LAB
ALBUMIN SERPL ELPH-MCNC: 2.4 G/DL — LOW (ref 3.3–5.2)
ALP SERPL-CCNC: 105 U/L — SIGNIFICANT CHANGE UP (ref 40–120)
ALT FLD-CCNC: 14 U/L — SIGNIFICANT CHANGE UP
ANION GAP SERPL CALC-SCNC: 15 MMOL/L — SIGNIFICANT CHANGE UP (ref 5–17)
APTT BLD: 106.1 SEC — HIGH (ref 27.5–35.5)
APTT BLD: 51.5 SEC — HIGH (ref 27.5–35.5)
APTT BLD: 86.4 SEC — HIGH (ref 27.5–35.5)
AST SERPL-CCNC: 14 U/L — SIGNIFICANT CHANGE UP
BASOPHILS # BLD AUTO: 0.06 K/UL — SIGNIFICANT CHANGE UP (ref 0–0.2)
BASOPHILS NFR BLD AUTO: 0.4 % — SIGNIFICANT CHANGE UP (ref 0–2)
BILIRUB SERPL-MCNC: 0.3 MG/DL — LOW (ref 0.4–2)
BUN SERPL-MCNC: 25.1 MG/DL — HIGH (ref 8–20)
CALCIUM SERPL-MCNC: 8 MG/DL — LOW (ref 8.6–10.2)
CHLORIDE SERPL-SCNC: 110 MMOL/L — HIGH (ref 98–107)
CO2 SERPL-SCNC: 23 MMOL/L — SIGNIFICANT CHANGE UP (ref 22–29)
CREAT SERPL-MCNC: 1.45 MG/DL — HIGH (ref 0.5–1.3)
EOSINOPHIL # BLD AUTO: 0.06 K/UL — SIGNIFICANT CHANGE UP (ref 0–0.5)
EOSINOPHIL NFR BLD AUTO: 0.4 % — SIGNIFICANT CHANGE UP (ref 0–6)
GLUCOSE SERPL-MCNC: 110 MG/DL — HIGH (ref 70–99)
HCT VFR BLD CALC: 36.9 % — SIGNIFICANT CHANGE UP (ref 34.5–45)
HGB BLD-MCNC: 11.7 G/DL — SIGNIFICANT CHANGE UP (ref 11.5–15.5)
IMM GRANULOCYTES NFR BLD AUTO: 0.7 % — SIGNIFICANT CHANGE UP (ref 0–1.5)
LYMPHOCYTES # BLD AUTO: 1.23 K/UL — SIGNIFICANT CHANGE UP (ref 1–3.3)
LYMPHOCYTES # BLD AUTO: 7.2 % — LOW (ref 13–44)
MAGNESIUM SERPL-MCNC: 2.1 MG/DL — SIGNIFICANT CHANGE UP (ref 1.6–2.6)
MCHC RBC-ENTMCNC: 27.1 PG — SIGNIFICANT CHANGE UP (ref 27–34)
MCHC RBC-ENTMCNC: 31.7 GM/DL — LOW (ref 32–36)
MCV RBC AUTO: 85.4 FL — SIGNIFICANT CHANGE UP (ref 80–100)
MONOCYTES # BLD AUTO: 0.94 K/UL — HIGH (ref 0–0.9)
MONOCYTES NFR BLD AUTO: 5.5 % — SIGNIFICANT CHANGE UP (ref 2–14)
NEUTROPHILS # BLD AUTO: 14.56 K/UL — HIGH (ref 1.8–7.4)
NEUTROPHILS NFR BLD AUTO: 85.8 % — HIGH (ref 43–77)
PHOSPHATE SERPL-MCNC: 2.1 MG/DL — LOW (ref 2.4–4.7)
PLATELET # BLD AUTO: 151 K/UL — SIGNIFICANT CHANGE UP (ref 150–400)
POTASSIUM SERPL-MCNC: 3.3 MMOL/L — LOW (ref 3.5–5.3)
POTASSIUM SERPL-SCNC: 3.3 MMOL/L — LOW (ref 3.5–5.3)
PROT SERPL-MCNC: 5.9 G/DL — LOW (ref 6.6–8.7)
RBC # BLD: 4.32 M/UL — SIGNIFICANT CHANGE UP (ref 3.8–5.2)
RBC # FLD: 15 % — HIGH (ref 10.3–14.5)
SODIUM SERPL-SCNC: 148 MMOL/L — HIGH (ref 135–145)
WBC # BLD: 16.97 K/UL — HIGH (ref 3.8–10.5)
WBC # FLD AUTO: 16.97 K/UL — HIGH (ref 3.8–10.5)

## 2021-11-01 PROCEDURE — 95720 EEG PHY/QHP EA INCR W/VEEG: CPT

## 2021-11-01 PROCEDURE — 99233 SBSQ HOSP IP/OBS HIGH 50: CPT

## 2021-11-01 PROCEDURE — 99232 SBSQ HOSP IP/OBS MODERATE 35: CPT

## 2021-11-01 PROCEDURE — 99221 1ST HOSP IP/OBS SF/LOW 40: CPT

## 2021-11-01 RX ORDER — SODIUM,POTASSIUM PHOSPHATES 278-250MG
1 POWDER IN PACKET (EA) ORAL EVERY 4 HOURS
Refills: 0 | Status: COMPLETED | OUTPATIENT
Start: 2021-11-01 | End: 2021-11-01

## 2021-11-01 RX ORDER — SODIUM CHLORIDE 9 MG/ML
1000 INJECTION, SOLUTION INTRAVENOUS
Refills: 0 | Status: DISCONTINUED | OUTPATIENT
Start: 2021-11-01 | End: 2021-11-02

## 2021-11-01 RX ORDER — METHOCARBAMOL 500 MG/1
1000 TABLET, FILM COATED ORAL ONCE
Refills: 0 | Status: COMPLETED | OUTPATIENT
Start: 2021-11-01 | End: 2021-11-01

## 2021-11-01 RX ORDER — POTASSIUM CHLORIDE 20 MEQ
20 PACKET (EA) ORAL
Refills: 0 | Status: COMPLETED | OUTPATIENT
Start: 2021-11-01 | End: 2021-11-01

## 2021-11-01 RX ORDER — ACETAMINOPHEN 500 MG
650 TABLET ORAL ONCE
Refills: 0 | Status: COMPLETED | OUTPATIENT
Start: 2021-11-01 | End: 2021-11-01

## 2021-11-01 RX ORDER — LEVETIRACETAM 250 MG/1
750 TABLET, FILM COATED ORAL
Refills: 0 | Status: DISCONTINUED | OUTPATIENT
Start: 2021-11-01 | End: 2021-11-03

## 2021-11-01 RX ADMIN — Medication 50 MILLIEQUIVALENT(S): at 09:35

## 2021-11-01 RX ADMIN — Medication 650 MILLIGRAM(S): at 23:45

## 2021-11-01 RX ADMIN — MEROPENEM 100 MILLIGRAM(S): 1 INJECTION INTRAVENOUS at 18:04

## 2021-11-01 RX ADMIN — PANTOPRAZOLE SODIUM 40 MILLIGRAM(S): 20 TABLET, DELAYED RELEASE ORAL at 11:37

## 2021-11-01 RX ADMIN — CHLORHEXIDINE GLUCONATE 1 APPLICATION(S): 213 SOLUTION TOPICAL at 05:36

## 2021-11-01 RX ADMIN — METHOCARBAMOL 1000 MILLIGRAM(S): 500 TABLET, FILM COATED ORAL at 22:46

## 2021-11-01 RX ADMIN — LEVETIRACETAM 400 MILLIGRAM(S): 250 TABLET, FILM COATED ORAL at 05:41

## 2021-11-01 RX ADMIN — HEPARIN SODIUM 1800 UNIT(S)/HR: 5000 INJECTION INTRAVENOUS; SUBCUTANEOUS at 21:18

## 2021-11-01 RX ADMIN — Medication 50 MILLIEQUIVALENT(S): at 11:37

## 2021-11-01 RX ADMIN — Medication 650 MILLIGRAM(S): at 22:45

## 2021-11-01 RX ADMIN — Medication 27.5 MILLIGRAM(S): at 05:37

## 2021-11-01 RX ADMIN — MEROPENEM 100 MILLIGRAM(S): 1 INJECTION INTRAVENOUS at 05:37

## 2021-11-01 RX ADMIN — Medication 1 APPLICATION(S): at 18:04

## 2021-11-01 RX ADMIN — Medication 1 APPLICATION(S): at 11:38

## 2021-11-01 RX ADMIN — HEPARIN SODIUM 1600 UNIT(S)/HR: 5000 INJECTION INTRAVENOUS; SUBCUTANEOUS at 06:46

## 2021-11-01 RX ADMIN — LEVETIRACETAM 400 MILLIGRAM(S): 250 TABLET, FILM COATED ORAL at 18:04

## 2021-11-01 RX ADMIN — HEPARIN SODIUM 4000 UNIT(S): 5000 INJECTION INTRAVENOUS; SUBCUTANEOUS at 21:20

## 2021-11-01 RX ADMIN — Medication 1 PACKET(S): at 18:03

## 2021-11-01 RX ADMIN — Medication 1 APPLICATION(S): at 05:37

## 2021-11-01 RX ADMIN — Medication 1 APPLICATION(S): at 23:26

## 2021-11-01 RX ADMIN — HEPARIN SODIUM 1600 UNIT(S)/HR: 5000 INJECTION INTRAVENOUS; SUBCUTANEOUS at 14:07

## 2021-11-01 RX ADMIN — Medication 1 PACKET(S): at 21:22

## 2021-11-01 RX ADMIN — HEPARIN SODIUM 4000 UNIT(S): 5000 INJECTION INTRAVENOUS; SUBCUTANEOUS at 00:59
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
## 2021-11-01 NOTE — PHYSICAL THERAPY INITIAL EVALUATION ADULT - ACTIVE RANGE OF MOTION EXAMINATION, REHAB EVAL
mild limitation right UE due to swelling - RN Maribel made aware/wang. upper extremity Active ROM was WNL (within normal limits)
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
## 2021-11-01 NOTE — PROGRESS NOTE ADULT - SUBJECTIVE AND OBJECTIVE BOX
INFECTIOUS DISEASES AND INTERNAL MEDICINE at [place]  =======================================================  Wan Raya MD  Diplomates American Board of Internal Medicine and Infectious Diseases  Telephone [phone]  Fax            [phone]  =======================================================    RASHAUN FLORES [id number]    Follow up: AMS  BACTEREMIA ESBL    Allergies:  No Known Allergies      Medications:  chlorhexidine 4% Liquid 1 Application(s) Topical <User Schedule>  heparin   Injectable 8000 Unit(s) IV Push every 6 hours PRN  heparin   Injectable 4000 Unit(s) IV Push every 6 hours PRN  heparin  Infusion.  Unit(s)/Hr IV Continuous <Continuous>  lactated ringers. 1000 milliLiter(s) IV Continuous <Continuous>  levETIRAcetam  IVPB 1000 milliGRAM(s) IV Intermittent every 12 hours  meropenem  IVPB 1000 milliGRAM(s) IV Intermittent every 12 hours  pantoprazole  Injectable 40 milliGRAM(s) IV Push daily  petrolatum Ophthalmic Ointment 1 Application(s) Both EYES every 6 hours  potassium chloride  20 mEq/100 mL IVPB 20 milliEquivalent(s) IV Intermittent every 2 hours  valproate sodium IVPB 500 milliGRAM(s) IV Intermittent every 12 hours    SOCIAL       FAMILY   FAMILY HISTORY:  FH: stroke  dad    FH: diabetes mellitus  mom    Family history of hepatitis C  mom      REVIEW OF SYSTEMS:  CONSTITUTIONAL:  No Fever or chills  HEENT:   No diplopia or blurred vision.  No earache, sore throat or runny nose.  CARDIOVASCULAR:  No pressure, squeezing, strangling, tightness, heaviness or aching about the chest, neck, axilla or epigastrium.  RESPIRATORY:  No cough, shortness of breath, PND or orthopnea.  GASTROINTESTINAL:  No nausea, vomiting or diarrhea.  GENITOURINARY:  No dysuria, frequency or urgency. No Blood in urine  MUSCULOSKELETAL:   moves all joints  SKIN:  No change in skin, hair or nails.  NEUROLOGIC:  MORE AWAKE ANSWERS SIMPLE QUESTIONS         Physical Exam:  I Vital Signs Last 24 Hrs  T(C): 37 (01 Nov 2021 12:32), Max: 37 (31 Oct 2021 23:52)  T(F): 98.6 (01 Nov 2021 12:32), Max: 98.6 (31 Oct 2021 23:52)  HR: 71 (01 Nov 2021 14:00) (58 - 96)  BP: 126/70 (01 Nov 2021 14:00) (110/72 - 132/119)  BP(mean): 85 (01 Nov 2021 14:00) (79 - 126)  RR: 16 (01 Nov 2021 08:00) (16 - 20)  SpO2: 95% (01 Nov 2021 14:00) (93% - 100%)    GEN: NAD,  AWAKE  ANSWERS  QUESTIONS   HEENT: normocephalic and atraumatic. EOMI. RAYNE.    NECK: Supple. No carotid bruits.  No lymphadenopathy or thyromegaly.  LUNGS: Clear to auscultation.  HEART: Regular rate and rhythm without murmur.  ABDOMEN: Soft, nontender, and nondistended.  Positive bowel sounds.    : No CVA tenderness  EXTREMITIES: Without any cyanosis, clubbing, rash, lesions or edema.  MSK: no joint swelling  NEUROLOGIC:  PAPRAPLEGIA        Labs:  Vitals:  ===     =======================================================  Current Antibiotics:  meropenem  IVPB 1000 milliGRAM(s) IV Intermittent every 12 hours    Other medications:  chlorhexidine 4% Liquid 1 Application(s) Topical <User Schedule>  heparin  Infusion.  Unit(s)/Hr IV Continuous <Continuous>  lactated ringers. 1000 milliLiter(s) IV Continuous <Continuous>  levETIRAcetam  IVPB 1000 milliGRAM(s) IV Intermittent every 12 hours  pantoprazole  Injectable 40 milliGRAM(s) IV Push daily  petrolatum Ophthalmic Ointment 1 Application(s) Both EYES every 6 hours  potassium chloride  20 mEq/100 mL IVPB 20 milliEquivalent(s) IV Intermittent every 2 hours  valproate sodium IVPB 500 milliGRAM(s) IV Intermittent every 12 hours      =======================================================  Labs:                               11.7   16.97 )-----------( 151      ( 01 Nov 2021 06:29 )             36.9   11-01    148<H>  |  110<H>  |  25.1<H>  ----------------------------<  110<H>  3.3<L>   |  23.0  |  1.45<H>    Ca    8.0<L>      01 Nov 2021 06:29  Phos  2.1     11-01  Mg     2.1     11-01    TPro  5.9<L>  /  Alb  2.4<L>  /  TBili  0.3<L>  /  DBili  x   /  AST  14  /  ALT  14  /  AlkPhos  105  11-01              WBC Count: 18.57 K/uL (10-31-21 @ 04:17)  WBC Count: 20.96 K/uL (10-30-21 @ 07:08)          Alkaline Phosphatase, Serum: 110 U/L (10-31-21 @ 04:17)  Alkaline Phosphatase, Serum: 118 U/L (10-30-21 @ 07:08)  Alanine Aminotransferase (ALT/SGPT): 18 U/L (10-31-21 @ 04:17)  Alanine Aminotransferase (ALT/SGPT): 22 U/L (10-30-21 @ 07:08)  Aspartate Aminotransferase (AST/SGOT): 18 U/L (10-31-21 @ 04:17)  Aspartate Aminotransferase (AST/SGOT): 31 U/L (10-30-21 @ 07:08)  Bilirubin Total, Serum: 0.3 mg/dL (10-31-21 @ 04:17)  Bilirubin Total, Serum: 0.3 mg/dL (10-30-21 @ 07:08)
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
## 2021-11-01 NOTE — EEG REPORT - NS EEG TEXT BOX
[facility]   COMPREHENSIVE EPILEPSY CENTER   REPORT OF LONG-TERM VIDEO EEG     [facility]: [address], [place] [address], Ph#: [phone]  [facility]: [phone] [place] [address], Ph#: [phone]  [facility]: [address] [place] [address], Ph#: [phone]    Patient Name: RASHAUN FLORES  Age and : 46y ([birth year])  MRN #: [id number]  Location: [address]  Referring Physician: Maryam Remy    Start Time/Date: 08:00 on 10-31-21  End Time/Date: 08:00 on 21  Duration: 24h    _____________________________________________________________  STUDY INFORMATION    EEG Recording Technique:  The patient underwent continuous Video-EEG monitoring, using Telemetry System hardware on the XLTek Digital System. EEG and video data were stored on a computer hard drive with important events saved in digital archive files. The material was reviewed by a physician (electroencephalographer / epileptologist) on a daily basis. Olu and seizure detection algorithms were utilized and reviewed. An EEG Technician attended to the patient, and was available throughout daytime work hours.  The epilepsy center neurologist was available in person or on call 24-hours per day.    EEG Placement and Labeling of Electrodes:  The EEG was performed utilizing 20 channel referential EEG connections (coronal over temporal over parasagittal montage) using all standard 10-20 electrode placements with EKG, with additional electrodes placed in the inferior temporal region using the modified 10-10 montage electrode placements for elective admissions, or if deemed necessary. Recording was at a sampling rate of 256 samples per second per channel. Time synchronized digital video recording was done simultaneously with EEG recording. A low light infrared camera was used for low light recording.     _____________________________________________________________  HISTORY    Patient is a 46y old  Female who presents with a chief complaint of Transfer for EEG (2021 08:47)      PERTINENT MEDICATION:  levETIRAcetam  IVPB 1000 milliGRAM(s) IV Intermittent every 12 hours  meropenem  IVPB 1000 milliGRAM(s) IV Intermittent every 12 hours  valproate sodium IVPB 500 milliGRAM(s) IV Intermittent every 12 hours    _____________________________________________________________  STUDY INTERPRETATION    Findings: The background was continuous and reactive. During wakefulness, the posterior dominant rhythm consisted of fragments of 7 Hz activity, with amplitude to 30 uV, that attenuated to eye opening.      Background Slowing:  Background predominantly consisted of theta and multifocal delta activities.    Focal Slowing:   None were present.    Sleep Background:  Drowsiness was characterized by fragmentation, attenuation, and slowing of the background activity.    Stage II sleep transients were not recorded.    Other Non-Epileptiform Findings:  None were present.    Interictal Epileptiform Activity:   None were present.    Events:  Clinical events: None recorded.  Seizures: None recorded.    Activation Procedures:   Hyperventilation was not performed.    Photic stimulation was not performed.     Artifacts:  Intermittent myogenic and movement artifacts were noted.    ECG:  The heart rate on single channel ECG was predominantly between 60-70 BPM.    _____________________________________________________________  EEG SUMMARY/CLASSIFICATION    Abnormal EEG in an altered patient.  - Moderate generalized slowing.    _____________________________________________________________  EEG IMPRESSION/CLINICAL CORRELATE    Abnormal EEG study.  1. Moderate nonspecific diffuse or multifocal cerebral dysfunction.   2. No epileptiform pattern or seizure seen.    Today's study improved significantly compared to prior 24hr recording due to resolution of epileptiform patterns.     _____________________________________________________________    Vilma Baugh MD  Director, Epilepsy/EMU - [facility]

## 2021-11-01 NOTE — CONSULT NOTE ADULT - TIME BILLING
chart review, lab review, imaging review, notes review. Discussion with  and coordination of care with all relevant providers and consultants caring for patient. A lot oi

## 2021-11-01 NOTE — PHYSICAL THERAPY INITIAL EVALUATION ADULT - ADDITIONAL COMMENTS
Pt. reports she has been a resident of [facility] since 2018. Pt. base line is dependent and use of a wade lift for OOB and into a w/c.
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
## 2021-11-01 NOTE — DIETITIAN INITIAL EVALUATION ADULT. - PERTINENT LABORATORY DATA
11-01 Na148 mmol/L<H> Glu 110 mg/dL<H> K+ 3.3 mmol/L<L> Cr  1.45 mg/dL<H> BUN 25.1 mg/dL<H> Phos 2.1 mg/dL<L> Alb 2.4 g/dL<L> PAB n/a

## 2021-11-01 NOTE — PROGRESS NOTE ADULT - SUBJECTIVE AND OBJECTIVE BOX
Chief Complaint:  seizures    SUBJECTIVE / OVERNIGHT EVENTS: No acute events reported overnight.  Pt offers no acute complaints at this time.  Patient denies chest pain, SOB, abd pain, N/V, fever, chills, dysuria or any other complaints. All remainder ROS negative.       I&O's Summary    31 Oct 2021 07:01  -  01 Nov 2021 07:00  --------------------------------------------------------  IN: 2974 mL / OUT: 1505 mL / NET: 1469 mL          PHYSICAL EXAM:  Vital Signs Last 24 Hrs  T(C): 37 (01 Nov 2021 12:32), Max: 37 (31 Oct 2021 23:52)  T(F): 98.6 (01 Nov 2021 12:32), Max: 98.6 (31 Oct 2021 23:52)  HR: 70 (01 Nov 2021 12:00) (58 - 96)  BP: 125/95 (01 Nov 2021 12:00) (98/66 - 132/119)  BP(mean): 106 (01 Nov 2021 12:00) (77 - 126)  RR: 16 (01 Nov 2021 08:00) (16 - 20)  SpO2: 95% (01 Nov 2021 12:00) (93% - 100%)      GENERAL: pt examined bedside, laying comfortably in bed in NAD  HEENT: moist oral mucosa, clear conjunctiva, sclera nonicteric   RESPIRATORY: Normal respiratory effort; CTA b/l, no wheezing, rhonchi, rales  CARDIOVASCULAR: RRR, normal S1 and S2  ABDOMEN: soft, NT/ND, normoactive bowel sounds, no rebound/guarding  EXTREMITIES: No cynaosis, no clubbing, no lower extremity edema; Peripheral pulses are 2+ bilaterally  PSYCH: affect appropriate and cooperative  NEUROLOGY: A+O to person, place, and time, quadreplegia, withdraws b/l UE to noxious stim but no withdrawal in b/l LE    SKIN: stage II perianal pressure ulcer          LABS:                        11.7   16.97 )-----------( 151      ( 01 Nov 2021 06:29 )             36.9     11-01    148<H>  |  110<H>  |  25.1<H>  ----------------------------<  110<H>  3.3<L>   |  23.0  |  1.45<H>    Ca    8.0<L>      01 Nov 2021 06:29  Phos  2.1     11-01  Mg     2.1     11-01    TPro  5.9<L>  /  Alb  2.4<L>  /  TBili  0.3<L>  /  DBili  x   /  AST  14  /  ALT  14  /  AlkPhos  105  11-01    PT/INR - ( 31 Oct 2021 04:17 )   PT: 14.0 sec;   INR: 1.22 ratio         PTT - ( 01 Nov 2021 13:19 )  PTT:86.4 sec          Culture - Blood (collected 30 Oct 2021 07:10)  Source: .Blood Blood  Preliminary Report (01 Nov 2021 09:00):    No growth at 48 hours      CAPILLARY BLOOD GLUCOSE            RADIOLOGY & ADDITIONAL TESTS:          MEDICATIONS  (STANDING):  chlorhexidine 4% Liquid 1 Application(s) Topical <User Schedule>  dextrose 5% + sodium chloride 0.45%. 1000 milliLiter(s) (75 mL/Hr) IV Continuous <Continuous>  heparin  Infusion.  Unit(s)/Hr (18 mL/Hr) IV Continuous <Continuous>  levETIRAcetam  IVPB 1000 milliGRAM(s) IV Intermittent every 12 hours  meropenem  IVPB 1000 milliGRAM(s) IV Intermittent every 12 hours  pantoprazole  Injectable 40 milliGRAM(s) IV Push daily  petrolatum Ophthalmic Ointment 1 Application(s) Both EYES every 6 hours  valproate sodium IVPB 500 milliGRAM(s) IV Intermittent every 12 hours    MEDICATIONS  (PRN):  heparin   Injectable 8000 Unit(s) IV Push every 6 hours PRN For aPTT less than 40  heparin   Injectable 4000 Unit(s) IV Push every 6 hours PRN For aPTT between 40 - 57

## 2021-11-02 LAB
ALBUMIN SERPL ELPH-MCNC: 2.4 G/DL — LOW (ref 3.3–5.2)
ALP SERPL-CCNC: 98 U/L — SIGNIFICANT CHANGE UP (ref 40–120)
ALT FLD-CCNC: 13 U/L — SIGNIFICANT CHANGE UP
ANION GAP SERPL CALC-SCNC: 14 MMOL/L — SIGNIFICANT CHANGE UP (ref 5–17)
APTT BLD: 86.6 SEC — HIGH (ref 27.5–35.5)
APTT BLD: 97.8 SEC — HIGH (ref 27.5–35.5)
AST SERPL-CCNC: 12 U/L — SIGNIFICANT CHANGE UP
BASOPHILS # BLD AUTO: 0.07 K/UL — SIGNIFICANT CHANGE UP (ref 0–0.2)
BASOPHILS NFR BLD AUTO: 0.6 % — SIGNIFICANT CHANGE UP (ref 0–2)
BILIRUB SERPL-MCNC: 0.3 MG/DL — LOW (ref 0.4–2)
BUN SERPL-MCNC: 20.4 MG/DL — HIGH (ref 8–20)
CALCIUM SERPL-MCNC: 7.9 MG/DL — LOW (ref 8.6–10.2)
CHLORIDE SERPL-SCNC: 111 MMOL/L — HIGH (ref 98–107)
CO2 SERPL-SCNC: 22 MMOL/L — SIGNIFICANT CHANGE UP (ref 22–29)
CREAT SERPL-MCNC: 1.41 MG/DL — HIGH (ref 0.5–1.3)
EOSINOPHIL # BLD AUTO: 0.14 K/UL — SIGNIFICANT CHANGE UP (ref 0–0.5)
EOSINOPHIL NFR BLD AUTO: 1.2 % — SIGNIFICANT CHANGE UP (ref 0–6)
GLUCOSE SERPL-MCNC: 105 MG/DL — HIGH (ref 70–99)
HCT VFR BLD CALC: 37 % — SIGNIFICANT CHANGE UP (ref 34.5–45)
HGB BLD-MCNC: 11.6 G/DL — SIGNIFICANT CHANGE UP (ref 11.5–15.5)
IMM GRANULOCYTES NFR BLD AUTO: 4.1 % — HIGH (ref 0–1.5)
LYMPHOCYTES # BLD AUTO: 1.4 K/UL — SIGNIFICANT CHANGE UP (ref 1–3.3)
LYMPHOCYTES # BLD AUTO: 12.3 % — LOW (ref 13–44)
MAGNESIUM SERPL-MCNC: 2 MG/DL — SIGNIFICANT CHANGE UP (ref 1.6–2.6)
MCHC RBC-ENTMCNC: 27.1 PG — SIGNIFICANT CHANGE UP (ref 27–34)
MCHC RBC-ENTMCNC: 31.4 GM/DL — LOW (ref 32–36)
MCV RBC AUTO: 86.4 FL — SIGNIFICANT CHANGE UP (ref 80–100)
MONOCYTES # BLD AUTO: 1 K/UL — HIGH (ref 0–0.9)
MONOCYTES NFR BLD AUTO: 8.8 % — SIGNIFICANT CHANGE UP (ref 2–14)
NEUTROPHILS # BLD AUTO: 8.3 K/UL — HIGH (ref 1.8–7.4)
NEUTROPHILS NFR BLD AUTO: 73 % — SIGNIFICANT CHANGE UP (ref 43–77)
PHOSPHATE SERPL-MCNC: 1.8 MG/DL — LOW (ref 2.4–4.7)
PLATELET # BLD AUTO: 155 K/UL — SIGNIFICANT CHANGE UP (ref 150–400)
POTASSIUM SERPL-MCNC: 3.5 MMOL/L — SIGNIFICANT CHANGE UP (ref 3.5–5.3)
POTASSIUM SERPL-SCNC: 3.5 MMOL/L — SIGNIFICANT CHANGE UP (ref 3.5–5.3)
PROT SERPL-MCNC: 5.9 G/DL — LOW (ref 6.6–8.7)
RBC # BLD: 4.28 M/UL — SIGNIFICANT CHANGE UP (ref 3.8–5.2)
RBC # FLD: 15.1 % — HIGH (ref 10.3–14.5)
SODIUM SERPL-SCNC: 147 MMOL/L — HIGH (ref 135–145)
WBC # BLD: 11.38 K/UL — HIGH (ref 3.8–10.5)
WBC # FLD AUTO: 11.38 K/UL — HIGH (ref 3.8–10.5)

## 2021-11-02 PROCEDURE — 76937 US GUIDE VASCULAR ACCESS: CPT | Mod: 26

## 2021-11-02 PROCEDURE — 99233 SBSQ HOSP IP/OBS HIGH 50: CPT

## 2021-11-02 PROCEDURE — 36556 INSERT NON-TUNNEL CV CATH: CPT

## 2021-11-02 PROCEDURE — 95718 EEG PHYS/QHP 2-12 HR W/VEEG: CPT

## 2021-11-02 PROCEDURE — 76942 ECHO GUIDE FOR BIOPSY: CPT | Mod: 26,59

## 2021-11-02 RX ORDER — HEPARIN SODIUM 5000 [USP'U]/ML
8000 INJECTION INTRAVENOUS; SUBCUTANEOUS EVERY 6 HOURS
Refills: 0 | Status: DISCONTINUED | OUTPATIENT
Start: 2021-11-02 | End: 2021-11-03

## 2021-11-02 RX ORDER — HEPARIN SODIUM 5000 [USP'U]/ML
8000 INJECTION INTRAVENOUS; SUBCUTANEOUS ONCE
Refills: 0 | Status: DISCONTINUED | OUTPATIENT
Start: 2021-11-02 | End: 2021-11-03

## 2021-11-02 RX ORDER — OXYCODONE HYDROCHLORIDE 5 MG/1
5 TABLET ORAL EVERY 6 HOURS
Refills: 0 | Status: DISCONTINUED | OUTPATIENT
Start: 2021-11-02 | End: 2021-11-03

## 2021-11-02 RX ORDER — NYSTATIN CREAM 100000 [USP'U]/G
1 CREAM TOPICAL
Qty: 0 | Refills: 0 | DISCHARGE

## 2021-11-02 RX ORDER — CALCIUM CARBONATE 500(1250)
2 TABLET ORAL
Qty: 0 | Refills: 0 | DISCHARGE

## 2021-11-02 RX ORDER — GABAPENTIN 400 MG/1
1 CAPSULE ORAL
Qty: 0 | Refills: 0 | DISCHARGE

## 2021-11-02 RX ORDER — FERROUS SULFATE 325(65) MG
1 TABLET ORAL
Qty: 0 | Refills: 0 | DISCHARGE

## 2021-11-02 RX ORDER — METHOCARBAMOL 500 MG/1
750 TABLET, FILM COATED ORAL DAILY
Refills: 0 | Status: DISCONTINUED | OUTPATIENT
Start: 2021-11-02 | End: 2021-11-03

## 2021-11-02 RX ORDER — HEPARIN SODIUM 5000 [USP'U]/ML
INJECTION INTRAVENOUS; SUBCUTANEOUS
Qty: 25000 | Refills: 0 | Status: DISCONTINUED | OUTPATIENT
Start: 2021-11-02 | End: 2021-11-03

## 2021-11-02 RX ORDER — WARFARIN SODIUM 2.5 MG/1
8 TABLET ORAL
Qty: 0 | Refills: 0 | DISCHARGE

## 2021-11-02 RX ORDER — SODIUM,POTASSIUM PHOSPHATES 278-250MG
1 POWDER IN PACKET (EA) ORAL
Refills: 0 | Status: COMPLETED | OUTPATIENT
Start: 2021-11-02 | End: 2021-11-02

## 2021-11-02 RX ORDER — ACETAMINOPHEN 500 MG
650 TABLET ORAL ONCE
Refills: 0 | Status: COMPLETED | OUTPATIENT
Start: 2021-11-02 | End: 2021-11-02

## 2021-11-02 RX ORDER — OXYCODONE HYDROCHLORIDE 5 MG/1
10 TABLET ORAL EVERY 6 HOURS
Refills: 0 | Status: DISCONTINUED | OUTPATIENT
Start: 2021-11-02 | End: 2021-11-08

## 2021-11-02 RX ORDER — POTASSIUM CHLORIDE 20 MEQ
40 PACKET (EA) ORAL ONCE
Refills: 0 | Status: COMPLETED | OUTPATIENT
Start: 2021-11-02 | End: 2021-11-02

## 2021-11-02 RX ORDER — MAGNESIUM HYDROXIDE 400 MG/1
30 TABLET, CHEWABLE ORAL
Qty: 0 | Refills: 0 | DISCHARGE

## 2021-11-02 RX ORDER — MEROPENEM 1 G/30ML
1000 INJECTION INTRAVENOUS EVERY 8 HOURS
Refills: 0 | Status: DISCONTINUED | OUTPATIENT
Start: 2021-11-03 | End: 2021-11-05

## 2021-11-02 RX ORDER — DICLOFENAC SODIUM 30 MG/G
4 GEL TOPICAL
Qty: 0 | Refills: 0 | DISCHARGE

## 2021-11-02 RX ORDER — HEPARIN SODIUM 5000 [USP'U]/ML
4000 INJECTION INTRAVENOUS; SUBCUTANEOUS EVERY 6 HOURS
Refills: 0 | Status: DISCONTINUED | OUTPATIENT
Start: 2021-11-02 | End: 2021-11-03

## 2021-11-02 RX ORDER — SODIUM CHLORIDE 9 MG/ML
1000 INJECTION, SOLUTION INTRAVENOUS
Refills: 0 | Status: COMPLETED | OUTPATIENT
Start: 2021-11-02 | End: 2021-11-02

## 2021-11-02 RX ADMIN — HEPARIN SODIUM 1800 UNIT(S)/HR: 5000 INJECTION INTRAVENOUS; SUBCUTANEOUS at 17:25

## 2021-11-02 RX ADMIN — CHLORHEXIDINE GLUCONATE 1 APPLICATION(S): 213 SOLUTION TOPICAL at 06:17

## 2021-11-02 RX ADMIN — PANTOPRAZOLE SODIUM 40 MILLIGRAM(S): 20 TABLET, DELAYED RELEASE ORAL at 17:48

## 2021-11-02 RX ADMIN — LEVETIRACETAM 400 MILLIGRAM(S): 250 TABLET, FILM COATED ORAL at 06:42

## 2021-11-02 RX ADMIN — LEVETIRACETAM 400 MILLIGRAM(S): 250 TABLET, FILM COATED ORAL at 18:46

## 2021-11-02 RX ADMIN — OXYCODONE HYDROCHLORIDE 10 MILLIGRAM(S): 5 TABLET ORAL at 14:54

## 2021-11-02 RX ADMIN — METHOCARBAMOL 750 MILLIGRAM(S): 500 TABLET, FILM COATED ORAL at 17:50

## 2021-11-02 RX ADMIN — MEROPENEM 100 MILLIGRAM(S): 1 INJECTION INTRAVENOUS at 06:16

## 2021-11-02 RX ADMIN — OXYCODONE HYDROCHLORIDE 10 MILLIGRAM(S): 5 TABLET ORAL at 15:50

## 2021-11-02 RX ADMIN — Medication 650 MILLIGRAM(S): at 21:30

## 2021-11-02 RX ADMIN — Medication 1 PACKET(S): at 12:37

## 2021-11-02 RX ADMIN — MEROPENEM 100 MILLIGRAM(S): 1 INJECTION INTRAVENOUS at 17:48

## 2021-11-02 RX ADMIN — Medication 1 PACKET(S): at 16:16

## 2021-11-02 RX ADMIN — Medication 1 APPLICATION(S): at 12:37

## 2021-11-02 RX ADMIN — Medication 1 PACKET(S): at 08:29

## 2021-11-02 RX ADMIN — Medication 650 MILLIGRAM(S): at 22:30

## 2021-11-02 RX ADMIN — Medication 40 MILLIEQUIVALENT(S): at 16:16

## 2021-11-02 RX ADMIN — Medication 1 APPLICATION(S): at 06:15

## 2021-11-02 RX ADMIN — SODIUM CHLORIDE 75 MILLILITER(S): 9 INJECTION, SOLUTION INTRAVENOUS at 19:00

## 2021-11-02 NOTE — PROGRESS NOTE ADULT - SUBJECTIVE AND OBJECTIVE BOX
Chief Complaint:  seizures    SUBJECTIVE / OVERNIGHT EVENTS: No acute events reported overnight.  Pt offers no acute complaints at this time.  Patient denies chest pain, SOB, abd pain, N/V, fever, chills, dysuria or any other complaints. All remainder ROS negative.       I&O's Summary    31 Oct 2021 07:01  -  01 Nov 2021 07:00  --------------------------------------------------------  IN: 2974 mL / OUT: 1505 mL / NET: 1469 mL          PHYSICAL EXAM:  Vital Signs Last 24 Hrs  T(C): 36.8 (02 Nov 2021 10:21), Max: 37.1 (01 Nov 2021 19:28)  T(F): 98.3 (02 Nov 2021 10:21), Max: 98.8 (02 Nov 2021 00:00)  HR: 75 (02 Nov 2021 10:21) (65 - 93)  BP: 137/74 (02 Nov 2021 10:21) (113/81 - 137/74)  BP(mean): 89 (02 Nov 2021 08:00) (81 - 98)  RR: 16 (02 Nov 2021 10:21) (15 - 97)  SpO2: 97% (02 Nov 2021 10:21) (92% - 99%)      GENERAL: pt examined bedside, laying comfortably in bed in NAD  HEENT: moist oral mucosa, clear conjunctiva, sclera nonicteric   RESPIRATORY: Normal respiratory effort; CTA b/l, no wheezing, rhonchi, rales  CARDIOVASCULAR: RRR, normal S1 and S2  ABDOMEN: soft, NT/ND, normoactive bowel sounds, no rebound/guarding  EXTREMITIES: No cynaosis, no clubbing, no lower extremity edema; Peripheral pulses are 2+ bilaterally  PSYCH: affect appropriate and cooperative  NEUROLOGY: A+O to person, place, and time, b/l LE paralysis, no withdrawal to noxious stim b/l LE, 2/4 strength b/l UE    SKIN: stage II right and left ischeal pressure ulcers           LABS:                                          11.6   11.38 )-----------( 155      ( 02 Nov 2021 04:48 )             37.0       11-02    147<H>  |  111<H>  |  20.4<H>  ----------------------------<  105<H>  3.5   |  22.0  |  1.41<H>    Ca    7.9<L>      02 Nov 2021 04:48  Phos  1.8     11-02  Mg     2.0     11-02    TPro  5.9<L>  /  Alb  2.4<L>  /  TBili  0.3<L>  /  DBili  x   /  AST  12  /  ALT  13  /  AlkPhos  98  11-02          Culture - Blood (collected 30 Oct 2021 07:10)  Source: .Blood Blood  Preliminary Report (01 Nov 2021 09:00):    No growth at 48 hours      CAPILLARY BLOOD GLUCOSE            RADIOLOGY & ADDITIONAL TESTS:          MEDICATIONS  (STANDING):  chlorhexidine 4% Liquid 1 Application(s) Topical <User Schedule>  dextrose 5% + sodium chloride 0.45%. 1000 milliLiter(s) (75 mL/Hr) IV Continuous <Continuous>  heparin  Infusion.  Unit(s)/Hr (18 mL/Hr) IV Continuous <Continuous>  levETIRAcetam  IVPB 1000 milliGRAM(s) IV Intermittent every 12 hours  meropenem  IVPB 1000 milliGRAM(s) IV Intermittent every 12 hours  pantoprazole  Injectable 40 milliGRAM(s) IV Push daily  petrolatum Ophthalmic Ointment 1 Application(s) Both EYES every 6 hours  valproate sodium IVPB 500 milliGRAM(s) IV Intermittent every 12 hours    MEDICATIONS  (PRN):  heparin   Injectable 8000 Unit(s) IV Push every 6 hours PRN For aPTT less than 40  heparin   Injectable 4000 Unit(s) IV Push every 6 hours PRN For aPTT between 40 - 57
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
## 2021-11-02 NOTE — ADVANCED PRACTICE NURSE CONSULT - ASSESSMENT
Patient A&Ox4 , requires 2 p assist with turning.  Current Gilberto Score of 12. Wound and Primary RN present at bedside for site assessment.  Patient with noted episode of urinary incontinence at time of assessment.     ·	Left Ischial Tuberosity Stage 2 PI (1cm x1cm x 0.5cm) - white moist wound bed scant serous drainage within deep crease/cleft, periwound with IAD related redness, moist, erythema, intact     ·	Right Ischial Tuberosity Stage 2 PI (0.7cm x0.8cm x 0.3cm) - white moist wound bed scant serous drainage within deep crease/cleft, periwound with IAD related redness, moist, erythema, intact Patient A&Ox4 , requires 2 p assist with turning.  Current Gilberto Score of 12. Wound and Primary RN present at bedside for site assessment.  Patient with noted episode of urinary incontinence at time of assessment.  Significant perineal erythema to buttock IAD/MAD related.      ·	Left Ischial Tuberosity Stage 2 PI (1cm x1cm x 0.5cm) - white moist wound bed scant serous drainage within deep crease/cleft, periwound with IAD related redness, moist, erythema, intact     ·	Right Ischial Tuberosity Stage 2 PI (0.7cm x0.8cm x 0.3cm) - white moist wound bed scant serous drainage within deep crease/cleft, periwound with IAD related redness, moist, erythema, intact

## 2021-11-02 NOTE — PROGRESS NOTE ADULT - SUBJECTIVE AND OBJECTIVE BOX
[facility]                                                                     MD Saray Hendricks,                                               Epilepsy Consult #: [phone] ([phone])                                               Office: [address] [place] [address], [address]                                                 Phone: [phone]; Fax: [phone]                            ==============================================    EPILEPSY FOLLOW-UP NOTE      INTERVAL HISTORY:  No epileptiform discharges or seizure on EEG as we stopped valproic acid and decreased levetiracetam yesterday.    MEDICATIONS  (STANDING):  chlorhexidine 4% Liquid 1 Application(s) Topical <User Schedule>  dextrose 5% + sodium chloride 0.45%. 1000 milliLiter(s) (75 mL/Hr) IV Continuous <Continuous>  heparin   Injectable 8000 Unit(s) IV Push once  heparin  Infusion.  Unit(s)/Hr (18 mL/Hr) IV Continuous <Continuous>  levETIRAcetam  IVPB 750 milliGRAM(s) IV Intermittent two times a day  meropenem  IVPB 1000 milliGRAM(s) IV Intermittent every 12 hours  pantoprazole  Injectable 40 milliGRAM(s) IV Push daily  petrolatum Ophthalmic Ointment 1 Application(s) Both EYES every 6 hours  potassium phosphate / sodium phosphate Powder (PHOS-NaK) 1 Packet(s) Oral every 3 hours    Vital Signs Last 24 Hrs  T(C): 36.9 (02 Nov 2021 04:30), Max: 37.1 (01 Nov 2021 19:28)  T(F): 98.4 (02 Nov 2021 04:30), Max: 98.8 (02 Nov 2021 00:00)  HR: 73 (02 Nov 2021 07:00) (65 - 93)  BP: 129/80 (02 Nov 2021 07:00) (110/72 - 136/69)  BP(mean): 96 (02 Nov 2021 07:00) (81 - 106)  RR: 17 (02 Nov 2021 06:00) (15 - 98)  SpO2: 96% (02 Nov 2021 06:00) (92% - 99%)    GENERAL PHYSICAL EXAM:  GEN: no distress  HEENT: NCAT, OP clear  EYES: no nystagmus  NECK: supple  CV: RRR, no murmur     		  PULM: CTAB, no wheezing  ABD: soft, +BS, NT  SKIN: warm, dry    NEUROLOGICAL EXAM:  Awake and alert, answering questions appropriately, following command  PERRL, no gross facial asymmetry   4-/5 in bilateral upper extremities   0/5 in bilateral lower extremities   No sensation from 4 EXTs  2+ reflex in BL biceps and patella                                    Plantar responses downward bilaterally  Coordination deferred  Gait deferred     LABS:                        11.7   16.97 )-----------( 151      ( 01 Nov 2021 06:29 )             36.9     11-01    148<H>  |  110<H>  |  25.1<H>  ----------------------------<  110<H>  3.3<L>   |  23.0  |  1.45<H>    Ca    8.0<L>      01 Nov 2021 06:29  Phos  2.1     11-01  Mg     2.1     11-01    TPro  5.9<L>  /  Alb  2.4<L>  /  TBili  0.3<L>  /  DBili  x   /  AST  14  /  ALT  14  /  AlkPhos  105  11-01    CAPILLARY BLOOD GLUCOSE        LIVER FUNCTIONS - ( 01 Nov 2021 06:29 )  Alb: 2.4 g/dL / Pro: 5.9 g/dL / ALK PHOS: 105 U/L / ALT: 14 U/L / AST: 14 U/L / GGT: x           PT/INR - ( 31 Oct 2021 04:17 )   PT: 14.0 sec;   INR: 1.22 ratio         PTT - ( 01 Nov 2021 13:19 )  PTT:86.4 sec      OTHER IMAGING AND STUDIES:    non-elective EMU 11/1 - 11/2/21:  EEG Summary:  Abnormal EEG in an altered patient.  - Mild generalized slowing.    Impression/Clinical Correlate:  11/1 – 11/2: No events or seizures were recorded.    Interictal findings suggest mild nonspecific multifocal cerebral dysfunction.                                                                        [facility]                                                                     MD Saray Hendricks,                                               Epilepsy Consult #: [phone] ([phone])                                               Office: [address] [place] [address], [address]                                                 Phone: [phone]; Fax: [phone]                            ==============================================    EPILEPSY FOLLOW-UP NOTE      INTERVAL HISTORY:  No epileptiform discharges or seizure on EEG as we stopped valproic acid and decreased levetiracetam yesterday.    MEDICATIONS  (STANDING):  chlorhexidine 4% Liquid 1 Application(s) Topical <User Schedule>  dextrose 5% + sodium chloride 0.45%. 1000 milliLiter(s) (75 mL/Hr) IV Continuous <Continuous>  heparin   Injectable 8000 Unit(s) IV Push once  heparin  Infusion.  Unit(s)/Hr (18 mL/Hr) IV Continuous <Continuous>  levETIRAcetam  IVPB 750 milliGRAM(s) IV Intermittent two times a day  meropenem  IVPB 1000 milliGRAM(s) IV Intermittent every 12 hours  pantoprazole  Injectable 40 milliGRAM(s) IV Push daily  petrolatum Ophthalmic Ointment 1 Application(s) Both EYES every 6 hours  potassium phosphate / sodium phosphate Powder (PHOS-NaK) 1 Packet(s) Oral every 3 hours    Vital Signs Last 24 Hrs  T(C): 36.9 (02 Nov 2021 04:30), Max: 37.1 (01 Nov 2021 19:28)  T(F): 98.4 (02 Nov 2021 04:30), Max: 98.8 (02 Nov 2021 00:00)  HR: 73 (02 Nov 2021 07:00) (65 - 93)  BP: 129/80 (02 Nov 2021 07:00) (110/72 - 136/69)  BP(mean): 96 (02 Nov 2021 07:00) (81 - 106)  RR: 17 (02 Nov 2021 06:00) (15 - 98)  SpO2: 96% (02 Nov 2021 06:00) (92% - 99%)    GENERAL PHYSICAL EXAM:  GEN: no distress  HEENT: NCAT, OP clear  EYES: no nystagmus  NECK: supple  CV: RRR, no murmur     		  PULM: CTAB, no wheezing  ABD: soft, +BS, NT  SKIN: warm, dry    NEUROLOGICAL EXAM:  Awake and alert, answering questions appropriately, following command  PERRL, no gross facial asymmetry   4-/5 in bilateral upper extremities   0/5 in bilateral lower extremities   No sensation from 4 EXTs  2+ reflex in BL biceps and patella                                    Plantar responses downward bilaterally  Coordination deferred  Gait deferred     LABS:                          11.6   11.38 )-----------( 155      ( 02 Nov 2021 04:48 )             37.0     11-02    147<H>  |  111<H>  |  20.4<H>  ----------------------------<  105<H>  3.5   |  22.0  |  1.41<H>    Ca    7.9<L>      02 Nov 2021 04:48  Phos  1.8     11-02  Mg     2.0     11-02    TPro  5.9<L>  /  Alb  2.4<L>  /  TBili  0.3<L>  /  DBili  x   /  AST  12  /  ALT  13  /  AlkPhos  98  11-02    CAPILLARY BLOOD GLUCOSE        LIVER FUNCTIONS - ( 02 Nov 2021 04:48 )  Alb: 2.4 g/dL / Pro: 5.9 g/dL / ALK PHOS: 98 U/L / ALT: 13 U/L / AST: 12 U/L / GGT: x           PTT - ( 02 Nov 2021 04:48 )  PTT:97.8 sec      11-01-21 @ 07:01  -  11-02-21 @ 07:00  --------------------------------------------------------  IN: 2679 mL / OUT: 1300 mL / NET: 1379 mL    11-02-21 @ 07:01  -  11-02-21 @ 15:36  --------------------------------------------------------  IN: 336 mL / OUT: 0 mL / NET: 336 mL                     OTHER IMAGING AND STUDIES:    non-elective EMU 11/1 - 11/2/21:  EEG Summary:  Abnormal EEG in an altered patient.  - Mild generalized slowing.    Impression/Clinical Correlate:  11/1 – 11/2: No events or seizures were recorded.    Interictal findings suggest mild nonspecific multifocal cerebral dysfunction.
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
## 2021-11-03 LAB
ANION GAP SERPL CALC-SCNC: 12 MMOL/L — SIGNIFICANT CHANGE UP (ref 5–17)
ANISOCYTOSIS BLD QL: SLIGHT — SIGNIFICANT CHANGE UP
APTT BLD: 114.7 SEC — HIGH (ref 27.5–35.5)
APTT BLD: 83.4 SEC — HIGH (ref 27.5–35.5)
APTT BLD: 84.2 SEC — HIGH (ref 27.5–35.5)
BASOPHILS # BLD AUTO: 0 K/UL — SIGNIFICANT CHANGE UP (ref 0–0.2)
BASOPHILS NFR BLD AUTO: 0 % — SIGNIFICANT CHANGE UP (ref 0–2)
BUN SERPL-MCNC: 16.8 MG/DL — SIGNIFICANT CHANGE UP (ref 8–20)
CALCIUM SERPL-MCNC: 8.4 MG/DL — LOW (ref 8.6–10.2)
CHLORIDE SERPL-SCNC: 111 MMOL/L — HIGH (ref 98–107)
CO2 SERPL-SCNC: 22 MMOL/L — SIGNIFICANT CHANGE UP (ref 22–29)
CREAT SERPL-MCNC: 1.37 MG/DL — HIGH (ref 0.5–1.3)
EOSINOPHIL # BLD AUTO: 0.23 K/UL — SIGNIFICANT CHANGE UP (ref 0–0.5)
EOSINOPHIL NFR BLD AUTO: 1.8 % — SIGNIFICANT CHANGE UP (ref 0–6)
GIANT PLATELETS BLD QL SMEAR: PRESENT — SIGNIFICANT CHANGE UP
GLUCOSE SERPL-MCNC: 90 MG/DL — SIGNIFICANT CHANGE UP (ref 70–99)
HCT VFR BLD CALC: 37.8 % — SIGNIFICANT CHANGE UP (ref 34.5–45)
HCT VFR BLD CALC: 39.1 % — SIGNIFICANT CHANGE UP (ref 34.5–45)
HCT VFR BLD CALC: 40.6 % — SIGNIFICANT CHANGE UP (ref 34.5–45)
HGB BLD-MCNC: 11.8 G/DL — SIGNIFICANT CHANGE UP (ref 11.5–15.5)
HGB BLD-MCNC: 12.1 G/DL — SIGNIFICANT CHANGE UP (ref 11.5–15.5)
HGB BLD-MCNC: 12.2 G/DL — SIGNIFICANT CHANGE UP (ref 11.5–15.5)
HYPOCHROMIA BLD QL: SLIGHT — SIGNIFICANT CHANGE UP
INR BLD: 1.53 RATIO — HIGH (ref 0.88–1.16)
LYMPHOCYTES # BLD AUTO: 17.5 % — SIGNIFICANT CHANGE UP (ref 13–44)
LYMPHOCYTES # BLD AUTO: 2.26 K/UL — SIGNIFICANT CHANGE UP (ref 1–3.3)
MAGNESIUM SERPL-MCNC: 2 MG/DL — SIGNIFICANT CHANGE UP (ref 1.6–2.6)
MANUAL SMEAR VERIFICATION: SIGNIFICANT CHANGE UP
MCHC RBC-ENTMCNC: 26.4 PG — LOW (ref 27–34)
MCHC RBC-ENTMCNC: 27.2 PG — SIGNIFICANT CHANGE UP (ref 27–34)
MCHC RBC-ENTMCNC: 27.3 PG — SIGNIFICANT CHANGE UP (ref 27–34)
MCHC RBC-ENTMCNC: 30 GM/DL — LOW (ref 32–36)
MCHC RBC-ENTMCNC: 30.9 GM/DL — LOW (ref 32–36)
MCHC RBC-ENTMCNC: 31.2 GM/DL — LOW (ref 32–36)
MCV RBC AUTO: 87.5 FL — SIGNIFICANT CHANGE UP (ref 80–100)
MCV RBC AUTO: 87.9 FL — SIGNIFICANT CHANGE UP (ref 80–100)
MCV RBC AUTO: 87.9 FL — SIGNIFICANT CHANGE UP (ref 80–100)
METAMYELOCYTES # FLD: 2.6 % — HIGH (ref 0–0)
MICROCYTES BLD QL: SLIGHT — SIGNIFICANT CHANGE UP
MONOCYTES # BLD AUTO: 0.57 K/UL — SIGNIFICANT CHANGE UP (ref 0–0.9)
MONOCYTES NFR BLD AUTO: 4.4 % — SIGNIFICANT CHANGE UP (ref 2–14)
MYELOCYTES NFR BLD: 4.4 % — HIGH (ref 0–0)
NEUTROPHILS # BLD AUTO: 8.95 K/UL — HIGH (ref 1.8–7.4)
NEUTROPHILS NFR BLD AUTO: 68.4 % — SIGNIFICANT CHANGE UP (ref 43–77)
NEUTS BAND # BLD: 0.9 % — SIGNIFICANT CHANGE UP (ref 0–8)
PHOSPHATE SERPL-MCNC: 2.2 MG/DL — LOW (ref 2.4–4.7)
PLAT MORPH BLD: NORMAL — SIGNIFICANT CHANGE UP
PLATELET # BLD AUTO: 157 K/UL — SIGNIFICANT CHANGE UP (ref 150–400)
PLATELET # BLD AUTO: 180 K/UL — SIGNIFICANT CHANGE UP (ref 150–400)
PLATELET # BLD AUTO: 216 K/UL — SIGNIFICANT CHANGE UP (ref 150–400)
POLYCHROMASIA BLD QL SMEAR: SLIGHT — SIGNIFICANT CHANGE UP
POTASSIUM SERPL-MCNC: 3.9 MMOL/L — SIGNIFICANT CHANGE UP (ref 3.5–5.3)
POTASSIUM SERPL-SCNC: 3.9 MMOL/L — SIGNIFICANT CHANGE UP (ref 3.5–5.3)
PROTHROM AB SERPL-ACNC: 17.4 SEC — HIGH (ref 10.6–13.6)
RBC # BLD: 4.32 M/UL — SIGNIFICANT CHANGE UP (ref 3.8–5.2)
RBC # BLD: 4.45 M/UL — SIGNIFICANT CHANGE UP (ref 3.8–5.2)
RBC # BLD: 4.62 M/UL — SIGNIFICANT CHANGE UP (ref 3.8–5.2)
RBC # FLD: 15 % — HIGH (ref 10.3–14.5)
RBC # FLD: 15.1 % — HIGH (ref 10.3–14.5)
RBC # FLD: 15.3 % — HIGH (ref 10.3–14.5)
RBC BLD AUTO: ABNORMAL
SODIUM SERPL-SCNC: 145 MMOL/L — SIGNIFICANT CHANGE UP (ref 135–145)
WBC # BLD: 12.92 K/UL — HIGH (ref 3.8–10.5)
WBC # BLD: 13.45 K/UL — HIGH (ref 3.8–10.5)
WBC # BLD: 16.3 K/UL — HIGH (ref 3.8–10.5)
WBC # FLD AUTO: 12.92 K/UL — HIGH (ref 3.8–10.5)
WBC # FLD AUTO: 13.45 K/UL — HIGH (ref 3.8–10.5)
WBC # FLD AUTO: 16.3 K/UL — HIGH (ref 3.8–10.5)

## 2021-11-03 PROCEDURE — 99233 SBSQ HOSP IP/OBS HIGH 50: CPT

## 2021-11-03 RX ORDER — METHOCARBAMOL 500 MG/1
1500 TABLET, FILM COATED ORAL EVERY 6 HOURS
Refills: 0 | Status: DISCONTINUED | OUTPATIENT
Start: 2021-11-03 | End: 2021-11-04

## 2021-11-03 RX ORDER — HEPARIN SODIUM 5000 [USP'U]/ML
INJECTION INTRAVENOUS; SUBCUTANEOUS
Qty: 25000 | Refills: 0 | Status: DISCONTINUED | OUTPATIENT
Start: 2021-11-03 | End: 2021-11-03

## 2021-11-03 RX ORDER — FOLIC ACID 0.8 MG
1 TABLET ORAL
Qty: 0 | Refills: 0 | DISCHARGE

## 2021-11-03 RX ORDER — POLYETHYLENE GLYCOL 3350 17 G/17G
17 POWDER, FOR SOLUTION ORAL
Qty: 0 | Refills: 0 | DISCHARGE

## 2021-11-03 RX ORDER — SENNA PLUS 8.6 MG/1
2 TABLET ORAL
Qty: 0 | Refills: 0 | DISCHARGE

## 2021-11-03 RX ORDER — SODIUM,POTASSIUM PHOSPHATES 278-250MG
1 POWDER IN PACKET (EA) ORAL EVERY 4 HOURS
Refills: 0 | Status: COMPLETED | OUTPATIENT
Start: 2021-11-03 | End: 2021-11-03

## 2021-11-03 RX ORDER — FERROUS SULFATE 325(65) MG
325 TABLET ORAL
Refills: 0 | Status: DISCONTINUED | OUTPATIENT
Start: 2021-11-03 | End: 2021-11-17

## 2021-11-03 RX ORDER — HEPARIN SODIUM 5000 [USP'U]/ML
4000 INJECTION INTRAVENOUS; SUBCUTANEOUS EVERY 6 HOURS
Refills: 0 | Status: DISCONTINUED | OUTPATIENT
Start: 2021-11-03 | End: 2021-11-04

## 2021-11-03 RX ORDER — LEVETIRACETAM 250 MG/1
750 TABLET, FILM COATED ORAL
Refills: 0 | Status: DISCONTINUED | OUTPATIENT
Start: 2021-11-03 | End: 2021-11-17

## 2021-11-03 RX ORDER — FAMOTIDINE 10 MG/ML
1 INJECTION INTRAVENOUS
Qty: 0 | Refills: 0 | DISCHARGE

## 2021-11-03 RX ORDER — SENNA PLUS 8.6 MG/1
2 TABLET ORAL AT BEDTIME
Refills: 0 | Status: DISCONTINUED | OUTPATIENT
Start: 2021-11-03 | End: 2021-11-17

## 2021-11-03 RX ORDER — GABAPENTIN 400 MG/1
300 CAPSULE ORAL
Refills: 0 | Status: DISCONTINUED | OUTPATIENT
Start: 2021-11-03 | End: 2021-11-04

## 2021-11-03 RX ORDER — GABAPENTIN 400 MG/1
100 CAPSULE ORAL
Refills: 0 | Status: DISCONTINUED | OUTPATIENT
Start: 2021-11-03 | End: 2021-11-04

## 2021-11-03 RX ORDER — LEVETIRACETAM 250 MG/1
750 TABLET, FILM COATED ORAL ONCE
Refills: 0 | Status: COMPLETED | OUTPATIENT
Start: 2021-11-03 | End: 2021-11-03

## 2021-11-03 RX ORDER — POLYETHYLENE GLYCOL 3350 17 G/17G
17 POWDER, FOR SOLUTION ORAL AT BEDTIME
Refills: 0 | Status: DISCONTINUED | OUTPATIENT
Start: 2021-11-03 | End: 2021-11-17

## 2021-11-03 RX ORDER — FOLIC ACID 0.8 MG
1 TABLET ORAL DAILY
Refills: 0 | Status: DISCONTINUED | OUTPATIENT
Start: 2021-11-03 | End: 2021-11-17

## 2021-11-03 RX ORDER — FERROUS SULFATE 325(65) MG
1 TABLET ORAL
Qty: 0 | Refills: 0 | DISCHARGE

## 2021-11-03 RX ORDER — HEPARIN SODIUM 5000 [USP'U]/ML
1600 INJECTION INTRAVENOUS; SUBCUTANEOUS
Qty: 25000 | Refills: 0 | Status: DISCONTINUED | OUTPATIENT
Start: 2021-11-03 | End: 2021-11-04

## 2021-11-03 RX ORDER — HEPARIN SODIUM 5000 [USP'U]/ML
8000 INJECTION INTRAVENOUS; SUBCUTANEOUS EVERY 6 HOURS
Refills: 0 | Status: DISCONTINUED | OUTPATIENT
Start: 2021-11-03 | End: 2021-11-04

## 2021-11-03 RX ORDER — ACETAMINOPHEN 500 MG
2 TABLET ORAL
Qty: 0 | Refills: 0 | DISCHARGE

## 2021-11-03 RX ORDER — WARFARIN SODIUM 2.5 MG/1
5 TABLET ORAL DAILY
Refills: 0 | Status: DISCONTINUED | OUTPATIENT
Start: 2021-11-03 | End: 2021-11-05

## 2021-11-03 RX ADMIN — Medication 1 APPLICATION(S): at 17:24

## 2021-11-03 RX ADMIN — Medication 1 PACKET(S): at 17:24

## 2021-11-03 RX ADMIN — OXYCODONE HYDROCHLORIDE 5 MILLIGRAM(S): 5 TABLET ORAL at 10:09

## 2021-11-03 RX ADMIN — OXYCODONE HYDROCHLORIDE 10 MILLIGRAM(S): 5 TABLET ORAL at 06:07

## 2021-11-03 RX ADMIN — GABAPENTIN 300 MILLIGRAM(S): 400 CAPSULE ORAL at 21:31

## 2021-11-03 RX ADMIN — LEVETIRACETAM 750 MILLIGRAM(S): 250 TABLET, FILM COATED ORAL at 05:56

## 2021-11-03 RX ADMIN — MEROPENEM 100 MILLIGRAM(S): 1 INJECTION INTRAVENOUS at 12:19

## 2021-11-03 RX ADMIN — OXYCODONE HYDROCHLORIDE 5 MILLIGRAM(S): 5 TABLET ORAL at 10:39

## 2021-11-03 RX ADMIN — OXYCODONE HYDROCHLORIDE 10 MILLIGRAM(S): 5 TABLET ORAL at 15:04

## 2021-11-03 RX ADMIN — MEROPENEM 100 MILLIGRAM(S): 1 INJECTION INTRAVENOUS at 17:23

## 2021-11-03 RX ADMIN — WARFARIN SODIUM 5 MILLIGRAM(S): 2.5 TABLET ORAL at 21:31

## 2021-11-03 RX ADMIN — Medication 1 APPLICATION(S): at 05:54

## 2021-11-03 RX ADMIN — Medication 1 PACKET(S): at 14:59

## 2021-11-03 RX ADMIN — Medication 1 APPLICATION(S): at 12:18

## 2021-11-03 RX ADMIN — OXYCODONE HYDROCHLORIDE 10 MILLIGRAM(S): 5 TABLET ORAL at 21:55

## 2021-11-03 RX ADMIN — HEPARIN SODIUM 1600 UNIT(S)/HR: 5000 INJECTION INTRAVENOUS; SUBCUTANEOUS at 22:11

## 2021-11-03 RX ADMIN — HEPARIN SODIUM 1600 UNIT(S)/HR: 5000 INJECTION INTRAVENOUS; SUBCUTANEOUS at 12:07

## 2021-11-03 RX ADMIN — PANTOPRAZOLE SODIUM 40 MILLIGRAM(S): 20 TABLET, DELAYED RELEASE ORAL at 12:18

## 2021-11-03 RX ADMIN — METHOCARBAMOL 1500 MILLIGRAM(S): 500 TABLET, FILM COATED ORAL at 12:17

## 2021-11-03 RX ADMIN — Medication 1 TABLET(S): at 12:18

## 2021-11-03 RX ADMIN — POLYETHYLENE GLYCOL 3350 17 GRAM(S): 17 POWDER, FOR SOLUTION ORAL at 21:32

## 2021-11-03 RX ADMIN — CHLORHEXIDINE GLUCONATE 1 APPLICATION(S): 213 SOLUTION TOPICAL at 06:05

## 2021-11-03 RX ADMIN — Medication 1 MILLIGRAM(S): at 12:18

## 2021-11-03 RX ADMIN — Medication 30 MILLIGRAM(S): at 12:17

## 2021-11-03 RX ADMIN — LEVETIRACETAM 750 MILLIGRAM(S): 250 TABLET, FILM COATED ORAL at 17:24

## 2021-11-03 RX ADMIN — Medication 1 APPLICATION(S): at 00:21

## 2021-11-03 RX ADMIN — OXYCODONE HYDROCHLORIDE 10 MILLIGRAM(S): 5 TABLET ORAL at 21:31

## 2021-11-03 RX ADMIN — SENNA PLUS 2 TABLET(S): 8.6 TABLET ORAL at 21:31

## 2021-11-03 NOTE — PROVIDER CONTACT NOTE (CHANGE IN STATUS NOTIFICATION) - SITUATION
Patient states that she is seeing spiders on bed and spider webs in room. Patient also states that she can hear her dad crying in hallway.

## 2021-11-03 NOTE — CHART NOTE - NSCHARTNOTEFT_GEN_A_CORE
Notified by RN pt having auditory hallucinations and yelling out. Upon arrival to pt bedside pt sleeping comfortably, in no signs of acute distress. Pt awakened, A&O x3, answers questions appropriately, conversation appropriate, speech clear. Patient denies seeing or hearing anything at the time of exam, HA, dizziness, changes in vision, CP, SOB.  Lungs CTA B/L, no wheezing, rhonchi or rales. Cardiac: +S1/S2, RRR. P 82, RR 18, O2 sat 98% on room air. Per documentation pt started on Robaxin today, PMD held medication earlier this evening due to pt having visual hallucinations. Pt due for Robaxin tonight, will D/C medication. Pt is at baseline at this time. RN to continue to monitor and escalate PRN. PMD will be notified in AM.

## 2021-11-03 NOTE — PROGRESS NOTE ADULT - SUBJECTIVE AND OBJECTIVE BOX
Chief Complaint:  seizures    SUBJECTIVE / OVERNIGHT EVENTS: No acute events reported overnight.  Pt offers no acute complaints at this time.  Patient denies chest pain, SOB, abd pain, N/V, fever, chills, dysuria or any other complaints. All remainder ROS negative.       I&O's Summary    31 Oct 2021 07:01  -  01 Nov 2021 07:00  --------------------------------------------------------  IN: 2974 mL / OUT: 1505 mL / NET: 1469 mL          PHYSICAL EXAM:  Vital Signs Last 24 Hrs  T(C): 36.7 (03 Nov 2021 04:07), Max: 38.1 (02 Nov 2021 21:01)  T(F): 98.1 (03 Nov 2021 04:07), Max: 100.5 (02 Nov 2021 21:01)  HR: 69 (03 Nov 2021 04:07) (69 - 83)  BP: 143/81 (03 Nov 2021 04:07) (132/74 - 147/82)  BP(mean): --  RR: 16 (03 Nov 2021 04:07) (16 - 18)  SpO2: 97% (03 Nov 2021 04:07) (97% - 99%)      GENERAL: pt examined bedside, laying comfortably in bed in NAD  HEENT: moist oral mucosa, clear conjunctiva, sclera nonicteric   RESPIRATORY: Normal respiratory effort; CTA b/l, no wheezing, rhonchi, rales  CARDIOVASCULAR: RRR, normal S1 and S2  ABDOMEN: soft, NT/ND, normoactive bowel sounds, no rebound/guarding  : +arthur   EXTREMITIES: No cynaosis, no clubbing, no lower extremity edema; Peripheral pulses are 2+ bilaterally  PSYCH: affect appropriate and cooperative  NEUROLOGY: A+O to person, place, and time, b/l LE paralysis, no withdrawal to noxious stim b/l LE, 2/4 strength b/l UE    SKIN: stage II right and left ischeal pressure ulcers           LABS:                                      12.2   12.92 )-----------( 157      ( 03 Nov 2021 07:06 )             40.6       11-03    145  |  111<H>  |  16.8  ----------------------------<  90  3.9   |  22.0  |  1.37<H>    Ca    8.4<L>      03 Nov 2021 07:06  Phos  2.2     11-03  Mg     2.0     11-03    TPro  5.9<L>  /  Alb  2.4<L>  /  TBili  0.3<L>  /  DBili  x   /  AST  12  /  ALT  13  /  AlkPhos  98  11-02      Culture - Blood (collected 30 Oct 2021 07:10)  Source: .Blood Blood  Preliminary Report (01 Nov 2021 09:00):    No growth at 48 hours      CAPILLARY BLOOD GLUCOSE            RADIOLOGY & ADDITIONAL TESTS:          MEDICATIONS  (STANDING):  chlorhexidine 4% Liquid 1 Application(s) Topical <User Schedule>  dextrose 5% + sodium chloride 0.45%. 1000 milliLiter(s) (75 mL/Hr) IV Continuous <Continuous>  heparin  Infusion.  Unit(s)/Hr (18 mL/Hr) IV Continuous <Continuous>  levETIRAcetam  IVPB 1000 milliGRAM(s) IV Intermittent every 12 hours  meropenem  IVPB 1000 milliGRAM(s) IV Intermittent every 12 hours  pantoprazole  Injectable 40 milliGRAM(s) IV Push daily  petrolatum Ophthalmic Ointment 1 Application(s) Both EYES every 6 hours  valproate sodium IVPB 500 milliGRAM(s) IV Intermittent every 12 hours    MEDICATIONS  (PRN):  heparin   Injectable 8000 Unit(s) IV Push every 6 hours PRN For aPTT less than 40  heparin   Injectable 4000 Unit(s) IV Push every 6 hours PRN For aPTT between 40 - 57

## 2021-11-03 NOTE — PROGRESS NOTE ADULT - SUBJECTIVE AND OBJECTIVE BOX
[facility]                                                                     MD Saray Hendricks DO                                              Epilepsy Consult #: [phone] ([phone])                                               Office: [address] [place] [address], [address]                                                 Phone: [phone]; Fax: [phone]                            ==============================================    EPILEPSY FOLLOW-UP NOTE      INTERVAL HISTORY:  C/o muscle spasm this morning.    MEDICATIONS  (STANDING):  chlorhexidine 4% Liquid 1 Application(s) Topical <User Schedule>  folic acid 1 milliGRAM(s) Oral daily  heparin  Infusion. 1600 Unit(s)/Hr (16 mL/Hr) IV Continuous <Continuous>  levETIRAcetam 750 milliGRAM(s) Oral two times a day  meropenem  IVPB 1000 milliGRAM(s) IV Intermittent every 8 hours  methocarbamol 1500 milliGRAM(s) Oral every 6 hours  multivitamin 1 Tablet(s) Oral daily  pantoprazole  Injectable 40 milliGRAM(s) IV Push daily  PARoxetine 30 milliGRAM(s) Oral daily  petrolatum Ophthalmic Ointment 1 Application(s) Both EYES every 6 hours  polyethylene glycol 3350 17 Gram(s) Oral at bedtime  potassium phosphate / sodium phosphate Powder (PHOS-NaK) 1 Packet(s) Oral every 4 hours  senna 2 Tablet(s) Oral at bedtime    Vital Signs Last 24 Hrs  T(C): 36.7 (03 Nov 2021 04:07), Max: 38.1 (02 Nov 2021 21:01)  T(F): 98.1 (03 Nov 2021 04:07), Max: 100.5 (02 Nov 2021 21:01)  HR: 69 (03 Nov 2021 04:07) (69 - 83)  BP: 143/81 (03 Nov 2021 04:07) (132/74 - 147/82)  BP(mean): --  RR: 16 (03 Nov 2021 04:07) (16 - 18)  SpO2: 97% (03 Nov 2021 04:07) (97% - 99%)    GENERAL PHYSICAL EXAM:  GEN: no distress  HEENT: NCAT, OP clear  EYES: no nystagmus  NECK: supple  CV: RRR, no murmur     		  PULM: CTAB, no wheezing  ABD: soft, +BS, NT  SKIN: warm, dry    NEUROLOGICAL EXAM:  Awake and alert, answering questions appropriately, following command  PERRL, no gross facial asymmetry   4-/5 in bilateral upper extremities   0/5 in bilateral lower extremities   No sensation from 4 EXTs  2+ reflex in BL biceps and patella                                    Plantar responses downward bilaterally  Coordination deferred  Gait deferred     LABS:                       12.2   12.92 )-----------( 157      ( 03 Nov 2021 07:06 )             40.6     11-03    145  |  111<H>  |  16.8  ----------------------------<  90  3.9   |  22.0  |  1.37<H>    Ca    8.4<L>      03 Nov 2021 07:06  Phos  2.2     11-03  Mg     2.0     11-03    TPro  5.9<L>  /  Alb  2.4<L>  /  TBili  0.3<L>  /  DBili  x   /  AST  12  /  ALT  13  /  AlkPhos  98  11-02    CAPILLARY BLOOD GLUCOSE        LIVER FUNCTIONS - ( 02 Nov 2021 04:48 )  Alb: 2.4 g/dL / Pro: 5.9 g/dL / ALK PHOS: 98 U/L / ALT: 13 U/L / AST: 12 U/L / GGT: x           PTT - ( 03 Nov 2021 07:06 )  PTT:114.7 sec      OTHER IMAGING AND STUDIES:    non-elective EMU 11/1 - 11/2/21:  EEG Summary:  Abnormal EEG in an altered patient.  - Mild generalized slowing.    Impression/Clinical Correlate:  11/1 – 11/2: No events or seizures were recorded.    Interictal findings suggest mild nonspecific multifocal cerebral dysfunction.

## 2021-11-03 NOTE — PROVIDER CONTACT NOTE (OTHER) - BACKGROUND
Pt transfer of care from [facility] [place] [facility] for EEG. H/O TBI paranalgesic, Bacteremia.
pt came from [facility] with arthur. Hematuria noted. on heparin gtt.
pt admitted with known esbl bacteremia. RL from [facility] for eeg. on meropenem

## 2021-11-03 NOTE — PROGRESS NOTE ADULT - TIME BILLING
Assessing presenting problems of acute illness, as well as medical decision making including initiating plan of care, obtaining history, examining the patient, reviewing data, reviewing radiologic exams, coordinating care with multidisciplinary team and writing this note.

## 2021-11-03 NOTE — CHART NOTE - NSCHARTNOTEFT_GEN_A_CORE
Contacted by RN due to patient with arthur leaking. RN had manager come take a look and multiple attempts at irrigating, advancing and deflating and reinflating the balloon with no success.   RN also reports patient lost peripheral iv access on her thumb, and is due for IV abx. She does have a midline that is currently being used for a heparin drip.    -replace arthur  -Peripheral IV access    Continue to monitor Contacted by RN due to patient with arthur leaking. RN had manager come take a look and multiple attempts at irrigating, advancing and deflating and reinflating the balloon with no success.   RN also reports patient lost peripheral iv access on her thumb, and is due for IV abx. She does have a midline that is currently being used for a heparin drip.    -replace arthur  -Peripheral IV access    Continue to monitor    Addendum:    RN able to successfully get arthur  IV access with US unsuccessful, will defer to IV team in the day  Patient due to Keppra 750mg IV, will hold IV morning dose and replace with one time Po Keppra 750 crushed up well in her puree diet
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
## 2021-11-04 LAB
ALBUMIN SERPL ELPH-MCNC: 2.6 G/DL — LOW (ref 3.3–5.2)
ALP SERPL-CCNC: 96 U/L — SIGNIFICANT CHANGE UP (ref 40–120)
ALT FLD-CCNC: 9 U/L — SIGNIFICANT CHANGE UP
ANION GAP SERPL CALC-SCNC: 13 MMOL/L — SIGNIFICANT CHANGE UP (ref 5–17)
ANION GAP SERPL CALC-SCNC: 14 MMOL/L — SIGNIFICANT CHANGE UP (ref 5–17)
APPEARANCE UR: ABNORMAL
APTT BLD: 70.2 SEC — HIGH (ref 27.5–35.5)
AST SERPL-CCNC: 13 U/L — SIGNIFICANT CHANGE UP
BACTERIA # UR AUTO: ABNORMAL
BASOPHILS # BLD AUTO: 0.11 K/UL — SIGNIFICANT CHANGE UP (ref 0–0.2)
BASOPHILS NFR BLD AUTO: 0.8 % — SIGNIFICANT CHANGE UP (ref 0–2)
BILIRUB SERPL-MCNC: 0.3 MG/DL — LOW (ref 0.4–2)
BILIRUB UR-MCNC: NEGATIVE — SIGNIFICANT CHANGE UP
BUN SERPL-MCNC: 15.2 MG/DL — SIGNIFICANT CHANGE UP (ref 8–20)
BUN SERPL-MCNC: 15.7 MG/DL — SIGNIFICANT CHANGE UP (ref 8–20)
CALCIUM SERPL-MCNC: 7.8 MG/DL — LOW (ref 8.6–10.2)
CALCIUM SERPL-MCNC: 7.8 MG/DL — LOW (ref 8.6–10.2)
CHLORIDE SERPL-SCNC: 112 MMOL/L — HIGH (ref 98–107)
CHLORIDE SERPL-SCNC: 112 MMOL/L — HIGH (ref 98–107)
CO2 SERPL-SCNC: 23 MMOL/L — SIGNIFICANT CHANGE UP (ref 22–29)
CO2 SERPL-SCNC: 23 MMOL/L — SIGNIFICANT CHANGE UP (ref 22–29)
COLOR SPEC: SIGNIFICANT CHANGE UP
CREAT SERPL-MCNC: 1.5 MG/DL — HIGH (ref 0.5–1.3)
CREAT SERPL-MCNC: 1.53 MG/DL — HIGH (ref 0.5–1.3)
CULTURE RESULTS: SIGNIFICANT CHANGE UP
DIFF PNL FLD: ABNORMAL
EOSINOPHIL # BLD AUTO: 0.35 K/UL — SIGNIFICANT CHANGE UP (ref 0–0.5)
EOSINOPHIL NFR BLD AUTO: 2.5 % — SIGNIFICANT CHANGE UP (ref 0–6)
EPI CELLS # UR: SIGNIFICANT CHANGE UP
GLUCOSE SERPL-MCNC: 92 MG/DL — SIGNIFICANT CHANGE UP (ref 70–99)
GLUCOSE SERPL-MCNC: 93 MG/DL — SIGNIFICANT CHANGE UP (ref 70–99)
GLUCOSE UR QL: NEGATIVE MG/DL — SIGNIFICANT CHANGE UP
HCT VFR BLD CALC: 31.2 % — LOW (ref 34.5–45)
HGB BLD-MCNC: 9.6 G/DL — LOW (ref 11.5–15.5)
IMM GRANULOCYTES NFR BLD AUTO: 13.5 % — HIGH (ref 0–1.5)
INR BLD: 2.36 RATIO — HIGH (ref 0.88–1.16)
KETONES UR-MCNC: NEGATIVE — SIGNIFICANT CHANGE UP
LACTATE BLDV-MCNC: 0.9 MMOL/L — SIGNIFICANT CHANGE UP (ref 0.5–2)
LEUKOCYTE ESTERASE UR-ACNC: ABNORMAL
LYMPHOCYTES # BLD AUTO: 17.3 % — SIGNIFICANT CHANGE UP (ref 13–44)
LYMPHOCYTES # BLD AUTO: 2.42 K/UL — SIGNIFICANT CHANGE UP (ref 1–3.3)
MAGNESIUM SERPL-MCNC: 2 MG/DL — SIGNIFICANT CHANGE UP (ref 1.6–2.6)
MCHC RBC-ENTMCNC: 26.8 PG — LOW (ref 27–34)
MCHC RBC-ENTMCNC: 30.8 GM/DL — LOW (ref 32–36)
MCV RBC AUTO: 87.2 FL — SIGNIFICANT CHANGE UP (ref 80–100)
MONOCYTES # BLD AUTO: 1.08 K/UL — HIGH (ref 0–0.9)
MONOCYTES NFR BLD AUTO: 7.7 % — SIGNIFICANT CHANGE UP (ref 2–14)
NEUTROPHILS # BLD AUTO: 8.13 K/UL — HIGH (ref 1.8–7.4)
NEUTROPHILS NFR BLD AUTO: 58.2 % — SIGNIFICANT CHANGE UP (ref 43–77)
NITRITE UR-MCNC: NEGATIVE — SIGNIFICANT CHANGE UP
PH UR: 7 — SIGNIFICANT CHANGE UP (ref 5–8)
PHOSPHATE SERPL-MCNC: 3.7 MG/DL — SIGNIFICANT CHANGE UP (ref 2.4–4.7)
PLATELET # BLD AUTO: 268 K/UL — SIGNIFICANT CHANGE UP (ref 150–400)
POTASSIUM SERPL-MCNC: 4.1 MMOL/L — SIGNIFICANT CHANGE UP (ref 3.5–5.3)
POTASSIUM SERPL-MCNC: 4.1 MMOL/L — SIGNIFICANT CHANGE UP (ref 3.5–5.3)
POTASSIUM SERPL-SCNC: 4.1 MMOL/L — SIGNIFICANT CHANGE UP (ref 3.5–5.3)
POTASSIUM SERPL-SCNC: 4.1 MMOL/L — SIGNIFICANT CHANGE UP (ref 3.5–5.3)
PROCALCITONIN SERPL-MCNC: 0.42 NG/ML — HIGH (ref 0.02–0.1)
PROT SERPL-MCNC: 5.4 G/DL — LOW (ref 6.6–8.7)
PROT UR-MCNC: 30 MG/DL
PROTHROM AB SERPL-ACNC: 26.3 SEC — HIGH (ref 10.6–13.6)
RBC # BLD: 3.58 M/UL — LOW (ref 3.8–5.2)
RBC # FLD: 15.2 % — HIGH (ref 10.3–14.5)
RBC CASTS # UR COMP ASSIST: ABNORMAL /HPF (ref 0–4)
SODIUM SERPL-SCNC: 148 MMOL/L — HIGH (ref 135–145)
SODIUM SERPL-SCNC: 149 MMOL/L — HIGH (ref 135–145)
SP GR SPEC: 1 — LOW (ref 1.01–1.02)
SPECIMEN SOURCE: SIGNIFICANT CHANGE UP
UROBILINOGEN FLD QL: NEGATIVE MG/DL — SIGNIFICANT CHANGE UP
WBC # BLD: 13.98 K/UL — HIGH (ref 3.8–10.5)
WBC # FLD AUTO: 13.98 K/UL — HIGH (ref 3.8–10.5)
WBC UR QL: ABNORMAL

## 2021-11-04 PROCEDURE — 99233 SBSQ HOSP IP/OBS HIGH 50: CPT

## 2021-11-04 PROCEDURE — 93010 ELECTROCARDIOGRAM REPORT: CPT

## 2021-11-04 PROCEDURE — 70450 CT HEAD/BRAIN W/O DYE: CPT | Mod: 26

## 2021-11-04 RX ORDER — PANTOPRAZOLE SODIUM 20 MG/1
40 TABLET, DELAYED RELEASE ORAL
Refills: 0 | Status: DISCONTINUED | OUTPATIENT
Start: 2021-11-05 | End: 2021-11-17

## 2021-11-04 RX ORDER — SODIUM CHLORIDE 9 MG/ML
1000 INJECTION, SOLUTION INTRAVENOUS
Refills: 0 | Status: COMPLETED | OUTPATIENT
Start: 2021-11-04 | End: 2021-11-05

## 2021-11-04 RX ADMIN — GABAPENTIN 100 MILLIGRAM(S): 400 CAPSULE ORAL at 05:24

## 2021-11-04 RX ADMIN — Medication 1 MILLIGRAM(S): at 12:18

## 2021-11-04 RX ADMIN — Medication 30 MILLIGRAM(S): at 12:17

## 2021-11-04 RX ADMIN — LEVETIRACETAM 750 MILLIGRAM(S): 250 TABLET, FILM COATED ORAL at 05:24

## 2021-11-04 RX ADMIN — Medication 1 TABLET(S): at 12:18

## 2021-11-04 RX ADMIN — MEROPENEM 100 MILLIGRAM(S): 1 INJECTION INTRAVENOUS at 01:53

## 2021-11-04 RX ADMIN — LEVETIRACETAM 750 MILLIGRAM(S): 250 TABLET, FILM COATED ORAL at 18:33

## 2021-11-04 RX ADMIN — Medication 1 APPLICATION(S): at 12:18

## 2021-11-04 RX ADMIN — Medication 1 APPLICATION(S): at 00:51

## 2021-11-04 RX ADMIN — MEROPENEM 100 MILLIGRAM(S): 1 INJECTION INTRAVENOUS at 18:33

## 2021-11-04 RX ADMIN — Medication 325 MILLIGRAM(S): at 18:33

## 2021-11-04 RX ADMIN — Medication 325 MILLIGRAM(S): at 05:24

## 2021-11-04 RX ADMIN — SODIUM CHLORIDE 80 MILLILITER(S): 9 INJECTION, SOLUTION INTRAVENOUS at 15:27

## 2021-11-04 RX ADMIN — SENNA PLUS 2 TABLET(S): 8.6 TABLET ORAL at 22:17

## 2021-11-04 RX ADMIN — CHLORHEXIDINE GLUCONATE 1 APPLICATION(S): 213 SOLUTION TOPICAL at 06:36

## 2021-11-04 RX ADMIN — PANTOPRAZOLE SODIUM 40 MILLIGRAM(S): 20 TABLET, DELAYED RELEASE ORAL at 12:18

## 2021-11-04 RX ADMIN — WARFARIN SODIUM 5 MILLIGRAM(S): 2.5 TABLET ORAL at 22:17

## 2021-11-04 RX ADMIN — POLYETHYLENE GLYCOL 3350 17 GRAM(S): 17 POWDER, FOR SOLUTION ORAL at 22:17

## 2021-11-04 RX ADMIN — MEROPENEM 100 MILLIGRAM(S): 1 INJECTION INTRAVENOUS at 12:17

## 2021-11-04 NOTE — PROVIDER CONTACT NOTE (OTHER) - ASSESSMENT
Pt noted to have auditory hallucinations. VSS stable. No signs or symptoms of acute distress noted.
pt A&Ox3. however at times forgetful, and noted to have auditory hallucinations. Believes her parents were speaking to her in the room. vitals otherwise stable
pt has had periods of frusteration and forgetfulness during shift. At times auditory and visual hallucinations noted. currently pt is a&Ox3 but uncooperative.
temp 100.5 oral. HR 80s. No s/s of acute distress noted.

## 2021-11-04 NOTE — CHART NOTE - NSCHARTNOTEFT_GEN_A_CORE
Notified by RN pt tachycardic HR increased to 150 bpm and spontaneously decreased to 130s sinus tach on cardiac monitor sustaining in the 130s, pt sleeping. Reviewed tele with monitor tech, pt HR now 100-115bpm sinus tach. 10/30 pt started on meropenem IVPB for ESBL bacteremia, ID onboard. Pt denies CP, SOB, cough, N/V/D, abd pain or discomfort, fever, chills.    VS: T 99.8 F rectal, /81, P 110, RR 20, O2 sat 99% on room air.  Lungs: CTA B/L, no wheezing, rhonchi, rales.    Cardiac: Mildly tachycardic, +S1/S2, no rubs  Abd: soft, nontender, nondistended.  : chronic F/C in place, changed 11/3 per documentation   Neuro: A&O x3    A/P: 47 y/o F receiving tx for ESBL bacteremia seen for sinus tachycardia.  - STAT EKG ordered: , sinus tach, no acute ischemic changes.  - Will repeat CBC with Diff, CMP, lactate, BC x2, procalcitonin, U/A STAT   - RN to continue to monitor and escalate PRN.   - PMD will be notified in AM.      - at pt bedside, pt refusing lab draw at this time. Despite educating pt about medical status and necessity of labs pt continues to refuse. Pt stated "No, not now. I'll let them draw it later when I feel like it." To revisit again for blood draw. Notified by RN pt tachycardic HR increased to 150 bpm and spontaneously decreased to 130s sinus tach on cardiac monitor sustaining in the 130s, pt sleeping. Reviewed tele with monitor tech, pt HR now 100-115bpm sinus tach. 10/30 pt started on meropenem IVPB for ESBL bacteremia, ID onboard. Pt denies CP, SOB, cough, N/V/D, abd pain or discomfort, fever, chills. Per RN wounds to buttock/coccyx without s/s infection, no foul odor, no purulent discharge.    VS: T 99.8 F rectal, /81, P 110, RR 20, O2 sat 99% on room air.  Lungs: CTA B/L, no wheezing, rhonchi, rales.    Cardiac: Mildly tachycardic, +S1/S2, no rubs  Abd: soft, nontender, nondistended.  : chronic F/C in place, changed 11/3 per documentation   Neuro: A&O x3    A/P: 47 y/o F receiving tx for ESBL bacteremia seen for sinus tachycardia.  - STAT EKG ordered: , sinus tach, no acute ischemic changes.  - Will repeat CBC with Diff, CMP, lactate, BC x2, procalcitonin, U/A STAT   - RN to continue to monitor and escalate PRN.   - PMD will be notified in AM.      - at pt bedside, pt refusing lab draw at this time. Despite educating pt about medical status and necessity of labs pt continues to refuse. Pt stated "No, not now. I'll let them draw it later when I feel like it." To revisit again for blood draw. Notified by RN pt tachycardic HR increased to 150 bpm and spontaneously decreased to 130s sinus tach on cardiac monitor sustaining in the 130s, pt sleeping. Reviewed tele with monitor tech, pt HR now 100-115bpm sinus tach. 10/30 pt started on meropenem IVPB for ESBL bacteremia, ID onboard. Pt denies CP, SOB, cough, N/V/D, abd pain or discomfort, fever, chills. Per RN wounds to buttock/coccyx without s/s infection, no foul odor, no purulent discharge.    VS: T 99.8 F rectal, /81, P 110, RR 20, O2 sat 99% on room air.  Lungs: CTA B/L, no wheezing, rhonchi, rales.    Cardiac: Mildly tachycardic, +S1/S2, no rubs  Abd: soft, nontender, nondistended.  : chronic F/C in place, changed 11/3 per documentation   Neuro: A&O x3    A/P: 47 y/o F receiving tx for ESBL bacteremia seen for sinus tachycardia.  - STAT EKG ordered: , sinus tach, no acute ischemic changes.  - Will repeat CBC with Diff, CMP, lactate, BC x2, procalcitonin, U/A STAT   - RN to continue to monitor and escalate PRN.   - PMD will be notified in AM.      - at pt bedside, pt refusing lab draw at this time. Despite educating pt about medical status and necessity of labs pt continues to refuse. Pt stated "No, not now. I'll let them draw it later when I feel like it." To revisit again for blood draw.  - 04:50 Pt agreeing to lab draw.  at pt bedside to perform blood draw.   -Discussed pt with Dr Zhang, in agreement with above. Recommend repeat CTH considering pt on heparin, F/U with ID and Epileptologist in AM.
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
## 2021-11-04 NOTE — PROVIDER CONTACT NOTE (OTHER) - REASON
temp 100.5
pt refusing stat blood work
pt tachycardic 130s-150s
rathur leaking around urethra
Pt noted with hallucinations

## 2021-11-04 NOTE — PROVIDER CONTACT NOTE (OTHER) - SITUATION
Pt refusing blood work despite education of importance. does not want to converse further and states " I will do it whenever I say to"
arthur leaking around urethra, unable to deflate balloon. IV infilatrated, only one access for  heparin gtt. due for antibiotics
notified by monitor tech that pt HR hit 150s for several minutes, now sustaining in 130s. pt sitting up in bed resting.
pt temp 100.5 no PRNs ordered.
Pt noted to have auditory hallucinations, yelling out in the room. Roboxan due at midnight.

## 2021-11-04 NOTE — PROGRESS NOTE ADULT - SUBJECTIVE AND OBJECTIVE BOX
Chief Complaint:  seizures    SUBJECTIVE / OVERNIGHT EVENTS:  Pt hallucinating overnight and noted to be tachycardic.  VS stable this morning and afebrile.  Pt poor historian at this time offers no acute complaints at this time.  Patient denies chest pain, SOB, abd pain, N/V, fever, chills, dysuria or any other complaints. All remainder ROS negative.       I&O's Summary    31 Oct 2021 07:01  -  01 Nov 2021 07:00  --------------------------------------------------------  IN: 2974 mL / OUT: 1505 mL / NET: 1469 mL          PHYSICAL EXAM:  Vital Signs Last 24 Hrs  T(C): 36.7 (03 Nov 2021 04:07), Max: 38.1 (02 Nov 2021 21:01)  T(F): 98.1 (03 Nov 2021 04:07), Max: 100.5 (02 Nov 2021 21:01)  HR: 69 (03 Nov 2021 04:07) (69 - 83)  BP: 143/81 (03 Nov 2021 04:07) (132/74 - 147/82)  BP(mean): --  RR: 16 (03 Nov 2021 04:07) (16 - 18)  SpO2: 97% (03 Nov 2021 04:07) (97% - 99%)      GENERAL: pt examined bedside, laying comfortably in bed in NAD  HEENT: moist oral mucosa, clear conjunctiva, sclera nonicteric   RESPIRATORY: Normal respiratory effort; CTA b/l, no wheezing, rhonchi, rales  CARDIOVASCULAR: RRR, normal S1 and S2  ABDOMEN: soft, NT/ND, normoactive bowel sounds, no rebound/guarding  : +arthur   EXTREMITIES: No cynaosis, no clubbing, no lower extremity edema; Peripheral pulses are 2+ bilaterally  PSYCH: affect appropriate and cooperative  NEUROLOGY: A+O to person, place, and time, b/l LE paralysis, no withdrawal to noxious stim b/l LE, 2/4 strength b/l UE    SKIN: stage II right and left ischeal pressure ulcers           LABS:                                      12.2   12.92 )-----------( 157      ( 03 Nov 2021 07:06 )             40.6       11-03    145  |  111<H>  |  16.8  ----------------------------<  90  3.9   |  22.0  |  1.37<H>    Ca    8.4<L>      03 Nov 2021 07:06  Phos  2.2     11-03  Mg     2.0     11-03    TPro  5.9<L>  /  Alb  2.4<L>  /  TBili  0.3<L>  /  DBili  x   /  AST  12  /  ALT  13  /  AlkPhos  98  11-02      Culture - Blood (collected 30 Oct 2021 07:10)  Source: .Blood Blood  Preliminary Report (01 Nov 2021 09:00):    No growth at 48 hours      CAPILLARY BLOOD GLUCOSE            RADIOLOGY & ADDITIONAL TESTS:          MEDICATIONS  (STANDING):  chlorhexidine 4% Liquid 1 Application(s) Topical <User Schedule>  dextrose 5% + sodium chloride 0.45%. 1000 milliLiter(s) (75 mL/Hr) IV Continuous <Continuous>  heparin  Infusion.  Unit(s)/Hr (18 mL/Hr) IV Continuous <Continuous>  levETIRAcetam  IVPB 1000 milliGRAM(s) IV Intermittent every 12 hours  meropenem  IVPB 1000 milliGRAM(s) IV Intermittent every 12 hours  pantoprazole  Injectable 40 milliGRAM(s) IV Push daily  petrolatum Ophthalmic Ointment 1 Application(s) Both EYES every 6 hours  valproate sodium IVPB 500 milliGRAM(s) IV Intermittent every 12 hours    MEDICATIONS  (PRN):  heparin   Injectable 8000 Unit(s) IV Push every 6 hours PRN For aPTT less than 40  heparin   Injectable 4000 Unit(s) IV Push every 6 hours PRN For aPTT between 40 - 57                                     Chief Complaint:  seizures    SUBJECTIVE / OVERNIGHT EVENTS:  Pt hallucinating overnight and noted to be tachycardic.  VS stable this morning and afebrile however still hallucinating. Offers no acute complaints at this time. Denies chest pain, SOB, abd pain, N/V, fever, chills, dysuria or any other complaints. All remainder ROS negative.       I&O's Summary    31 Oct 2021 07:01  -  01 Nov 2021 07:00  --------------------------------------------------------  IN: 2974 mL / OUT: 1505 mL / NET: 1469 mL          PHYSICAL EXAM:  Vital Signs Last 24 Hrs  T(C): 37 (04 Nov 2021 05:00), Max: 37.7 (04 Nov 2021 01:52)  T(F): 98.6 (04 Nov 2021 05:00), Max: 99.8 (04 Nov 2021 01:52)  HR: 98 (04 Nov 2021 05:00) (78 - 133)  BP: 119/77 (04 Nov 2021 05:00) (118/76 - 132/79)  BP(mean): --  RR: 18 (04 Nov 2021 05:00) (16 - 20)  SpO2: 97% (04 Nov 2021 05:00) (97% - 100%)      GENERAL: pt examined bedside, laying comfortably in bed in NAD  HEENT: moist oral mucosa, clear conjunctiva, sclera nonicteric   RESPIRATORY: Normal respiratory effort; CTA b/l, no wheezing, rhonchi, rales  CARDIOVASCULAR: RRR, normal S1 and S2  ABDOMEN: soft, NT/ND, normoactive bowel sounds, no rebound/guarding  : +arthur w/ light jose alfredo urine  EXTREMITIES: No cynaosis, no clubbing, no lower extremity edema; Peripheral pulses are 2+ bilaterally  PSYCH: affect appropriate and cooperative  NEUROLOGY: A+O to person, place, and time, b/l LE paralysis, no withdrawal to noxious stim b/l LE, 2/4 strength b/l UE    SKIN: stage II right and left ischeal pressure ulcers with no evidence of drainage and not malodorous            LABS:                                            9.6    13.98 )-----------( 268      ( 04 Nov 2021 07:08 )             31.2     11-04    148<H>  |  112<H>  |  15.7  ----------------------------<  92  4.1   |  23.0  |  1.53<H>    Ca    7.8<L>      04 Nov 2021 07:08  Phos  3.7     11-04  Mg     2.0     11-04    TPro  5.4<L>  /  Alb  2.6<L>  /  TBili  0.3<L>  /  DBili  x   /  AST  13  /  ALT  9   /  AlkPhos  96  11-04      Culture - Blood (collected 30 Oct 2021 07:10)  Source: .Blood Blood  Preliminary Report (01 Nov 2021 09:00):    No growth at 48 hours      CAPILLARY BLOOD GLUCOSE            RADIOLOGY & ADDITIONAL TESTS:          MEDICATIONS  (STANDING):  chlorhexidine 4% Liquid 1 Application(s) Topical <User Schedule>  dextrose 5% + sodium chloride 0.45%. 1000 milliLiter(s) (75 mL/Hr) IV Continuous <Continuous>  heparin  Infusion.  Unit(s)/Hr (18 mL/Hr) IV Continuous <Continuous>  levETIRAcetam  IVPB 1000 milliGRAM(s) IV Intermittent every 12 hours  meropenem  IVPB 1000 milliGRAM(s) IV Intermittent every 12 hours  pantoprazole  Injectable 40 milliGRAM(s) IV Push daily  petrolatum Ophthalmic Ointment 1 Application(s) Both EYES every 6 hours  valproate sodium IVPB 500 milliGRAM(s) IV Intermittent every 12 hours    MEDICATIONS  (PRN):  heparin   Injectable 8000 Unit(s) IV Push every 6 hours PRN For aPTT less than 40  heparin   Injectable 4000 Unit(s) IV Push every 6 hours PRN For aPTT between 40 - 57

## 2021-11-04 NOTE — PROVIDER CONTACT NOTE (OTHER) - ACTION/TREATMENT ORDERED:
change arthur, irrigate intermittently. will check for IV access with ultrasound
continue to monitor pt
stat ekg
Mj loredo. Will evaluate patient
ordered tylenol.

## 2021-11-05 LAB
ALBUMIN SERPL ELPH-MCNC: 2.8 G/DL — LOW (ref 3.3–5.2)
ALP SERPL-CCNC: 93 U/L — SIGNIFICANT CHANGE UP (ref 40–120)
ALT FLD-CCNC: 8 U/L — SIGNIFICANT CHANGE UP
ANION GAP SERPL CALC-SCNC: 14 MMOL/L — SIGNIFICANT CHANGE UP (ref 5–17)
APTT BLD: 35.6 SEC — HIGH (ref 27.5–35.5)
AST SERPL-CCNC: 11 U/L — SIGNIFICANT CHANGE UP
BASOPHILS # BLD AUTO: 0 K/UL — SIGNIFICANT CHANGE UP (ref 0–0.2)
BASOPHILS NFR BLD AUTO: 0 % — SIGNIFICANT CHANGE UP (ref 0–2)
BILIRUB SERPL-MCNC: 0.3 MG/DL — LOW (ref 0.4–2)
BLD GP AB SCN SERPL QL: SIGNIFICANT CHANGE UP
BUN SERPL-MCNC: 17.2 MG/DL — SIGNIFICANT CHANGE UP (ref 8–20)
CALCIUM SERPL-MCNC: 7.8 MG/DL — LOW (ref 8.6–10.2)
CHLORIDE SERPL-SCNC: 107 MMOL/L — SIGNIFICANT CHANGE UP (ref 98–107)
CO2 SERPL-SCNC: 22 MMOL/L — SIGNIFICANT CHANGE UP (ref 22–29)
CREAT SERPL-MCNC: 1.41 MG/DL — HIGH (ref 0.5–1.3)
EOSINOPHIL # BLD AUTO: 0.2 K/UL — SIGNIFICANT CHANGE UP (ref 0–0.5)
EOSINOPHIL NFR BLD AUTO: 0.9 % — SIGNIFICANT CHANGE UP (ref 0–6)
GIANT PLATELETS BLD QL SMEAR: PRESENT — SIGNIFICANT CHANGE UP
GLUCOSE SERPL-MCNC: 102 MG/DL — HIGH (ref 70–99)
HCT VFR BLD CALC: 19.3 % — CRITICAL LOW (ref 34.5–45)
HCT VFR BLD CALC: 22.6 % — LOW (ref 34.5–45)
HGB BLD-MCNC: 6 G/DL — CRITICAL LOW (ref 11.5–15.5)
HGB BLD-MCNC: 7.1 G/DL — LOW (ref 11.5–15.5)
INR BLD: 3.05 RATIO — HIGH (ref 0.88–1.16)
LYMPHOCYTES # BLD AUTO: 10.4 % — LOW (ref 13–44)
LYMPHOCYTES # BLD AUTO: 2.35 K/UL — SIGNIFICANT CHANGE UP (ref 1–3.3)
MAGNESIUM SERPL-MCNC: 2 MG/DL — SIGNIFICANT CHANGE UP (ref 1.6–2.6)
MANUAL SMEAR VERIFICATION: SIGNIFICANT CHANGE UP
MCHC RBC-ENTMCNC: 27.7 PG — SIGNIFICANT CHANGE UP (ref 27–34)
MCHC RBC-ENTMCNC: 27.9 PG — SIGNIFICANT CHANGE UP (ref 27–34)
MCHC RBC-ENTMCNC: 31.1 GM/DL — LOW (ref 32–36)
MCHC RBC-ENTMCNC: 31.4 GM/DL — LOW (ref 32–36)
MCV RBC AUTO: 88.3 FL — SIGNIFICANT CHANGE UP (ref 80–100)
MCV RBC AUTO: 89.8 FL — SIGNIFICANT CHANGE UP (ref 80–100)
MONOCYTES # BLD AUTO: 1.76 K/UL — HIGH (ref 0–0.9)
MONOCYTES NFR BLD AUTO: 7.8 % — SIGNIFICANT CHANGE UP (ref 2–14)
MYELOCYTES NFR BLD: 0.9 % — HIGH (ref 0–0)
NEUTROPHILS # BLD AUTO: 17.84 K/UL — HIGH (ref 1.8–7.4)
NEUTROPHILS NFR BLD AUTO: 79.1 % — HIGH (ref 43–77)
PHOSPHATE SERPL-MCNC: 4.3 MG/DL — SIGNIFICANT CHANGE UP (ref 2.4–4.7)
PLAT MORPH BLD: NORMAL — SIGNIFICANT CHANGE UP
PLATELET # BLD AUTO: 351 K/UL — SIGNIFICANT CHANGE UP (ref 150–400)
PLATELET # BLD AUTO: 374 K/UL — SIGNIFICANT CHANGE UP (ref 150–400)
POLYCHROMASIA BLD QL SMEAR: SLIGHT — SIGNIFICANT CHANGE UP
POTASSIUM SERPL-MCNC: 4.3 MMOL/L — SIGNIFICANT CHANGE UP (ref 3.5–5.3)
POTASSIUM SERPL-SCNC: 4.3 MMOL/L — SIGNIFICANT CHANGE UP (ref 3.5–5.3)
PROT SERPL-MCNC: 5.7 G/DL — LOW (ref 6.6–8.7)
PROTHROM AB SERPL-ACNC: 33.6 SEC — HIGH (ref 10.6–13.6)
RAPID RVP RESULT: SIGNIFICANT CHANGE UP
RBC # BLD: 2.15 M/UL — LOW (ref 3.8–5.2)
RBC # BLD: 2.56 M/UL — LOW (ref 3.8–5.2)
RBC # FLD: 15.5 % — HIGH (ref 10.3–14.5)
RBC # FLD: 15.6 % — HIGH (ref 10.3–14.5)
RBC BLD AUTO: ABNORMAL
SARS-COV-2 RNA SPEC QL NAA+PROBE: SIGNIFICANT CHANGE UP
SODIUM SERPL-SCNC: 143 MMOL/L — SIGNIFICANT CHANGE UP (ref 135–145)
VARIANT LYMPHS # BLD: 0.9 % — SIGNIFICANT CHANGE UP (ref 0–6)
WBC # BLD: 22.56 K/UL — HIGH (ref 3.8–10.5)
WBC # BLD: 25.47 K/UL — HIGH (ref 3.8–10.5)
WBC # FLD AUTO: 22.56 K/UL — HIGH (ref 3.8–10.5)
WBC # FLD AUTO: 25.47 K/UL — HIGH (ref 3.8–10.5)

## 2021-11-05 PROCEDURE — 73701 CT LOWER EXTREMITY W/DYE: CPT | Mod: 26,RT

## 2021-11-05 PROCEDURE — 99232 SBSQ HOSP IP/OBS MODERATE 35: CPT

## 2021-11-05 PROCEDURE — 99233 SBSQ HOSP IP/OBS HIGH 50: CPT

## 2021-11-05 PROCEDURE — 95720 EEG PHY/QHP EA INCR W/VEEG: CPT

## 2021-11-05 RX ORDER — WARFARIN SODIUM 2.5 MG/1
3 TABLET ORAL ONCE
Refills: 0 | Status: DISCONTINUED | OUTPATIENT
Start: 2021-11-05 | End: 2021-11-05

## 2021-11-05 RX ORDER — MEROPENEM 1 G/30ML
1000 INJECTION INTRAVENOUS EVERY 8 HOURS
Refills: 0 | Status: COMPLETED | OUTPATIENT
Start: 2021-11-05 | End: 2021-11-11

## 2021-11-05 RX ORDER — MUPIROCIN 20 MG/G
1 OINTMENT TOPICAL EVERY 12 HOURS
Refills: 0 | Status: DISCONTINUED | OUTPATIENT
Start: 2021-11-05 | End: 2021-11-17

## 2021-11-05 RX ADMIN — Medication 325 MILLIGRAM(S): at 17:48

## 2021-11-05 RX ADMIN — Medication 1 MILLIGRAM(S): at 12:31

## 2021-11-05 RX ADMIN — SENNA PLUS 2 TABLET(S): 8.6 TABLET ORAL at 21:03

## 2021-11-05 RX ADMIN — LEVETIRACETAM 750 MILLIGRAM(S): 250 TABLET, FILM COATED ORAL at 05:04

## 2021-11-05 RX ADMIN — SODIUM CHLORIDE 80 MILLILITER(S): 9 INJECTION, SOLUTION INTRAVENOUS at 05:09

## 2021-11-05 RX ADMIN — Medication 1 TABLET(S): at 12:32

## 2021-11-05 RX ADMIN — MEROPENEM 100 MILLIGRAM(S): 1 INJECTION INTRAVENOUS at 12:30

## 2021-11-05 RX ADMIN — Medication 1 APPLICATION(S): at 17:43

## 2021-11-05 RX ADMIN — Medication 1 APPLICATION(S): at 12:33

## 2021-11-05 RX ADMIN — MUPIROCIN 1 APPLICATION(S): 20 OINTMENT TOPICAL at 21:05

## 2021-11-05 RX ADMIN — PANTOPRAZOLE SODIUM 40 MILLIGRAM(S): 20 TABLET, DELAYED RELEASE ORAL at 05:04

## 2021-11-05 RX ADMIN — LEVETIRACETAM 750 MILLIGRAM(S): 250 TABLET, FILM COATED ORAL at 17:48

## 2021-11-05 RX ADMIN — MEROPENEM 100 MILLIGRAM(S): 1 INJECTION INTRAVENOUS at 02:00

## 2021-11-05 RX ADMIN — MEROPENEM 100 MILLIGRAM(S): 1 INJECTION INTRAVENOUS at 21:04

## 2021-11-05 RX ADMIN — Medication 30 MILLIGRAM(S): at 12:32

## 2021-11-05 RX ADMIN — Medication 1 APPLICATION(S): at 05:04

## 2021-11-05 RX ADMIN — Medication 1 APPLICATION(S): at 01:06

## 2021-11-05 RX ADMIN — POLYETHYLENE GLYCOL 3350 17 GRAM(S): 17 POWDER, FOR SOLUTION ORAL at 21:04

## 2021-11-05 RX ADMIN — Medication 325 MILLIGRAM(S): at 05:04

## 2021-11-05 NOTE — CHART NOTE - NSCHARTNOTEFT_GEN_A_CORE
Pt acutely ill and needs urgent CT.  Pregnancy test has not been performed.  Benefit of performing CT outweighs risk.

## 2021-11-05 NOTE — CONSULT NOTE ADULT - ASSESSMENT
46F w/ PMHx TBI, functional paraplegia, W/C bound at baseline, C spine fixation, s/p trach/ PEG (now decannulated), seizure d/o, urinary retention w/ indwelling arthur, DVT on Coumadin, ESBL E Coli bacteriemia was [place] [facility] [place] and now transferred to [facility] for cvEEG. [place] [facility] [place] was altered, hypotensive, febrile 101 and in DIEUDONNE w/ a supra therapeutic INR ~ 9.5. Code stroke was called and stat CTH was grossly normal. Later a code sepsis was called and pt was resuscitated and given IV Abx. Her neuro status further worsened, and she became unresponsive to noxious stimuli. Not intubated as he was a DNR/DNI. Pt was loaded w/ Keppra and another CTH was requested which was also WNLs. A 3rd CTH was obtained 6hrs later which was also normal.   Over the next 24hrs she was on and off pressors and weaned of VM to RA. INR came down to 2.7 after Vit K. EEG was suggestive of a catastrophic neurological insult w/ B/L GPEDs. BCx were positive for ESBL E Coli and Meropenem was switched to Ayvcaz as per ID recs. Pt was loaded w/ VA 2g prior to transfer to [facility]  (30 Oct 2021 05:02)    General surgery consulted for management of a right proximal thigh hematoma. The patient is not currently endorsing any pain at the site of the hematoma. Of note, the patient is getting Coumadin 3 mg dosed daily for a provoked DVT.  CT scan was notable for a large, mixed density hematoma with active extravasation measuring 131 x 110 x 155 mm extending into the abductor and posterior compartments.     - No surgical intervention at this time  - Patient's duplexes should be repeated, and examined for evidence of a DVT; if there is no evidence of a DVT found, the patient's warfarin should be discontinued as it may be leading to the formation of a hematoma.     Discussed with Dr. Neno Bettencourt MD PGY3 46F R thigh hematoma while on coumadin for DVT, Hb6.0 receiving 2u PRBC, sinus tachyardia but normotensive    - No intervention from general surgery for thigh hematoma  - Repeat LE Duplex, if no evidence of DVT can consider holding AC  -q6h h/h and transfuse prbc for hb >7.0  -Hold coumadin and any AC if risk allows by primary team, can consult IR for IVC filter  -If Hb continues to down trend can consult Interventional Radiology for possible embolization      Discussed with Dr. Neno Bettencourt MD PGY3
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
## 2021-11-05 NOTE — PROGRESS NOTE ADULT - ASSESSMENT
46F w/ PMHx TBI, functional paraplegia, W/C bound at baseline, C spine fixation, s/p trach/ PEG (now decannulated), seizure d/o, urinary retention w/ indwelling arthur, DVT on Coumadin, ESBL E Coli bacteriemia was [place] [facility] and now transferred to [facility] for cvEEG. On presentation to [facility] she was altered, hypotensive, febrile 101 and in DIEUDONNE w/ a supra therapeutic INR ~ 9.5. Code stroke was called and stat CTH was grossly normal. Later a code sepsis was called and pt was resuscitated and given IV Abx. Her neuro status further worsened, and she became unresponsive to noxious stimuli. Not intubated as he was a DNR/DNI. Pt was loaded w/ Keppra and another CTH was requested which was also WNLs. A 3rd CTH was obtained 6hrs later which was also normal.   Over the next 24hrs she was on and off pressors and weaned of VM to RA. INR came down to 2.7 after Vit K. EEG was suggestive of a catastrophic neurological insult w/ B/L GPEDs. BCx were positive for ESBL E Coli   ESBL BACTEREMIA   CONTINUE   T  MERREM    REPEAT BLOOD CX  NEG  CSF NEG   WOULD PLAN ON TOTAL 2 WEEKS IV ABX FOR ESBL BACTERMIA   WOULD TREAT THROUGH 11/11/21  WILL  FOLLOW UP    AS NEEDED   PLEASE CALL IF QUESTIONS
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
## 2021-11-05 NOTE — PROGRESS NOTE ADULT - SUBJECTIVE AND OBJECTIVE BOX
Chief Complaint:  seizures    SUBJECTIVE / OVERNIGHT EVENTS:  Pts hallucinations worsening and more frequent episodes of sinus tachycardia, remainder of VS stable.  Unable to obtain ROS from pt due to mentation.       I&O's Summary    31 Oct 2021 07:01  -  01 Nov 2021 07:00  --------------------------------------------------------  IN: 2974 mL / OUT: 1505 mL / NET: 1469 mL          PHYSICAL EXAM:  Vital Signs Last 24 Hrs  T(C): 36.3 (05 Nov 2021 13:05), Max: 36.8 (05 Nov 2021 04:17)  T(F): 97.4 (05 Nov 2021 13:05), Max: 98.2 (05 Nov 2021 04:17)  HR: 96 (05 Nov 2021 13:05) (96 - 107)  BP: 90/59 (05 Nov 2021 13:05) (90/59 - 135/78)  BP(mean): --  RR: 18 (05 Nov 2021 04:17) (18 - 18)  SpO2: 100% (05 Nov 2021 13:05) (94% - 100%)      GENERAL: pt examined bedside, laying comfortably in bed in NAD  HEENT: moist oral mucosa, clear conjunctiva, sclera nonicteric   RESPIRATORY: Normal respiratory effort; CTA b/l, no wheezing, rhonchi, rales  CARDIOVASCULAR: RRR, normal S1 and S2  ABDOMEN: soft, NT/ND, normoactive bowel sounds, no rebound/guarding  : +arthur w/ light jose alfredo urine  EXTREMITIES: No cynaosis, no clubbing, no lower extremity edema; Peripheral pulses are 2+ bilaterally  PSYCH: intermittently agitated, hallucinating and not redirectable   NEUROLOGY: A+O to person, place, and time, b/l LE paralysis, no withdrawal to noxious stim b/l LE, 2/4 strength b/l UE    SKIN: stage II right and left ischeal pressure ulcers with no evidence of drainage, not malodorous but indurated today            LABS:                                          7.1    22.56 )-----------( 374      ( 05 Nov 2021 08:11 )             22.6       11-05    143  |  107  |  17.2  ----------------------------<  102<H>  4.3   |  22.0  |  1.41<H>    Ca    7.8<L>      05 Nov 2021 08:11  Phos  4.3     11-05  Mg     2.0     11-05    TPro  5.7<L>  /  Alb  2.8<L>  /  TBili  0.3<L>  /  DBili  x   /  AST  11  /  ALT  8   /  AlkPhos  93  11-05      Culture - Blood (collected 30 Oct 2021 07:10)  Source: .Blood Blood  Preliminary Report (01 Nov 2021 09:00):    No growth at 48 hours      CAPILLARY BLOOD GLUCOSE            RADIOLOGY & ADDITIONAL TESTS:          MEDICATIONS  (STANDING):  chlorhexidine 4% Liquid 1 Application(s) Topical <User Schedule>  dextrose 5% + sodium chloride 0.45%. 1000 milliLiter(s) (75 mL/Hr) IV Continuous <Continuous>  heparin  Infusion.  Unit(s)/Hr (18 mL/Hr) IV Continuous <Continuous>  levETIRAcetam  IVPB 1000 milliGRAM(s) IV Intermittent every 12 hours  meropenem  IVPB 1000 milliGRAM(s) IV Intermittent every 12 hours  pantoprazole  Injectable 40 milliGRAM(s) IV Push daily  petrolatum Ophthalmic Ointment 1 Application(s) Both EYES every 6 hours  valproate sodium IVPB 500 milliGRAM(s) IV Intermittent every 12 hours    MEDICATIONS  (PRN):  heparin   Injectable 8000 Unit(s) IV Push every 6 hours PRN For aPTT less than 40  heparin   Injectable 4000 Unit(s) IV Push every 6 hours PRN For aPTT between 40 - 57

## 2021-11-05 NOTE — CONSULT NOTE ADULT - SUBJECTIVE AND OBJECTIVE BOX
GENERAL SURGERY CONSULT    HPI:  46F w/ PMHx TBI, functional paraplegia, W/C bound at baseline, C spine fixation, s/p trach/ PEG (now decannulated), seizure d/o, urinary retention w/ indwelling arthur, DVT on Coumadin, ESBL E Coli bacteriemia was sent over from NH to [facility] w/ AMS and now transferred to [facility] for cvEEG. On presentation to [facility] she was altered, hypotensive, febrile 101 and in DIEUDONNE w/ a supra therapeutic INR ~ 9.5. Code stroke was called and stat CTH was grossly normal. Later a code sepsis was called and pt was resuscitated and given IV Abx. Her neuro status further worsened, and she became unresponsive to noxious stimuli. Not intubated as he was a DNR/DNI. Pt was loaded w/ Keppra and another CTH was requested which was also WNLs. A 3rd CTH was obtained 6hrs later which was also normal.   Over the next 24hrs she was on and off pressors and weaned of VM to RA. INR came down to 2.7 after Vit K. EEG was suggestive of a catastrophic neurological insult w/ B/L GPEDs. BCx were positive for ESBL E Coli and Meropenem was switched to Ayvcaz as per ID recs. Pt was loaded w/ VA 2g prior to transfer to [facility]  (30 Oct 2021 05:02)    General surgery consulted for management of a right proximal thigh hematoma. The patient is not currently endorsing any pain at the site of the hematoma. Of note, the patient is getting Coumadin 3 mg dosed daily for a provoked DVT.  CT scan was notable for a large, mixed density hematoma with active extravasation measuring 131 x 110 x 155 mm extending into the abductor and posterior compartments.     PAST MEDICAL HISTORY:  TBI (traumatic brain injury)    History of paraplegia    DVT, lower extremity      PAST SURGICAL HISTORY:  Chronic neck pain with history of cervical spinal surgery    History of Lorenza-en-Y gastric bypass        ALLERGIES:  No Known Allergies      FAMILY HISTORY: Noncontributory    SOCIAL HISTORY: Denies tobacco, EtOH, illicit substance use.     HOME MEDICATIONS:    MEDICATIONS  (STANDING):  chlorhexidine 4% Liquid 1 Application(s) Topical <User Schedule>  ferrous    sulfate 325 milliGRAM(s) Oral two times a day  folic acid 1 milliGRAM(s) Oral daily  levETIRAcetam 750 milliGRAM(s) Oral two times a day  meropenem  IVPB 1000 milliGRAM(s) IV Intermittent every 8 hours  multivitamin 1 Tablet(s) Oral daily  mupirocin 2% Nasal 1 Application(s) Nasal every 12 hours  pantoprazole   Suspension 40 milliGRAM(s) Oral before breakfast  PARoxetine 30 milliGRAM(s) Oral daily  petrolatum Ophthalmic Ointment 1 Application(s) Both EYES every 6 hours  polyethylene glycol 3350 17 Gram(s) Oral at bedtime  senna 2 Tablet(s) Oral at bedtime    MEDICATIONS  (PRN):  oxyCODONE    IR 5 milliGRAM(s) Oral every 6 hours PRN Moderate Pain (4 - 6)  oxyCODONE    IR 10 milliGRAM(s) Oral every 6 hours PRN Severe Pain (7 - 10)      VITALS & I/Os:  Vital Signs Last 24 Hrs  T(C): 36.7 (2021 21:00), Max: 37 (2021 20:19)  T(F): 98 (:00), Max: 98.6 (2021 20:19)  HR: 102 (2021 21:00) (96 - 107)  BP: 109/71 (2021 21:00) (90/59 - 135/78)  BP(mean): --  RR: 20 (2021 21:00) (18 - 20)  SpO2: 95% (2021 21:00) (94% - 100%)  CAPILLARY BLOOD GLUCOSE          I&O's Summary    2021 07:01  -  2021 07:00  --------------------------------------------------------  IN: 1010 mL / OUT: 1350 mL / NET: -340 mL    2021 07:01  -  2021 21:19  --------------------------------------------------------  IN: 0 mL / OUT: 300 mL / NET: -300 mL        GENERAL: In no acute distress, at times mumbling to herself about current events  RESPIRATORY: Nonlabored on RA  CARDIOVASCULAR: RRR   GASTROINTESTINAL: Abdomen soft, NT, ND, -R/-G.  No pulsatile mass, no flank tenderness or suprapubic tenderness. No hepatosplenomegaly.  MUSCULOSKELETAL: Hematoma noted on posterior R thigh, non-tender, no skin changes noted    LABS:                        6.0    25.47 )-----------( 351      ( 2021 18:02 )             19.3     11-    143  |  107  |  17.2  ----------------------------<  102<H>  4.3   |  22.0  |  1.41<H>    Ca    7.8<L>      2021 08:11  Phos  4.3     11-  Mg     2.0     11    TPro  5.7<L>  /  Alb  2.8<L>  /  TBili  0.3<L>  /  DBili  x   /  AST  11  /  ALT  8   /  AlkPhos  93  11-05    Lactate: chlorhexidine 4% Liquid 1 Application(s) Topical <User Schedule>  ferrous    sulfate 325 milliGRAM(s) Oral two times a day  folic acid 1 milliGRAM(s) Oral daily  levETIRAcetam 750 milliGRAM(s) Oral two times a day  meropenem  IVPB 1000 milliGRAM(s) IV Intermittent every 8 hours  multivitamin 1 Tablet(s) Oral daily  mupirocin 2% Nasal 1 Application(s) Nasal every 12 hours  oxyCODONE    IR 5 milliGRAM(s) Oral every 6 hours PRN  oxyCODONE    IR 10 milliGRAM(s) Oral every 6 hours PRN  pantoprazole   Suspension 40 milliGRAM(s) Oral before breakfast  PARoxetine 30 milliGRAM(s) Oral daily  petrolatum Ophthalmic Ointment 1 Application(s) Both EYES every 6 hours  polyethylene glycol 3350 17 Gram(s) Oral at bedtime  senna 2 Tablet(s) Oral at bedtime     PT/INR - ( 2021 08:11 )   PT: 33.6 sec;   INR: 3.05 ratio         PTT - ( 2021 08:11 )  PTT:35.6 sec          Urinalysis Basic - ( 2021 07:21 )    Color: Pale Yellow / Appearance: Slightly Turbid / S.005 / pH: x  Gluc: x / Ketone: Negative  / Bili: Negative / Urobili: Negative mg/dL   Blood: x / Protein: 30 mg/dL / Nitrite: Negative   Leuk Esterase: Moderate / RBC: 11-25 /HPF / WBC 11-25   Sq Epi: x / Non Sq Epi: Few / Bacteria: Few        IMAGING:       EXAM:  CT LWR EXT IC RT                          PROCEDURE DATE:  2021          INTERPRETATION:  CT OF THE RIGHT LOWER EXTREMITY    CLINICAL INFORMATION: Right lower extremity pain and swelling from the gluteal fold to the knee. Evaluate for hematoma versus fistula versus abscess.  TECHNIQUE: Multidetector CT of the right lower extremity was performed from the level of the pelvis through the level of the right knee. The study was performed following the intravenous administration of 96 ml Omnipaque 300 (4 ml discarded) . Multiplanar reformats were generated for review.    COMPARISON: CT angiogram of the abdomen and pelvis 2019.    FINDINGS:    BONE: Degeneration and partial fusion of the bilateral sacroiliac joints. No acute fracture. Well-corticated erosion is identified along the anterior aspect of the femoral head-neck junction on the left, which appears new when compared to prior CT of 2019. No additional osseous erosions are identified. Bilateral hip cartilage space narrowing and marginal osteophyte formation. Mild degeneration of the right knee joint.    SOFT TISSUE: In the adductor compartment of the left thigh there is a large mixed density collection measuring up to 131 x 110 x 135 mm in greatest dimensions favored to represent a hematoma. There is suspicion of active extravasation in the superior aspect of the hematoma (3:299-311, 5:65-74). Smaller amounts of fluid are seen tracking along the hamstring muscles in the posterior compartment of the right thigh. There is extensive edema in the subcutaneous fat along the medial aspect of the right proximal thigh. No additional focal fluid collections are identified.      IMPRESSION:  1.  In the right proximal thigh there is a large mixed density hematoma with active extravasation measuring 131 x 110 x 135 mm in greatest dimensions extending into the abductor and posterior compartments, as described above.  2.  Apparent osseous erosion in the anterior aspect of the left femoral head-neck junction, new when compared to prior exam. Consider nonemergent dedicated left hip imaging for further evaluation.    These results were discussed by Dr. Vargas with POLI Staley on 2021 at 1750 with repeat back.    --- End of Report ---            ROSS VARGAS MD; Attending Radiologist  This document has been electronically signed. 2021  5:49PM

## 2021-11-05 NOTE — PHARMACOTHERAPY INTERVENTION NOTE - COMMENTS
s/w MD and changed meropenem 1G Q12 to meropenem 1G Q8 as pts renal function has improved
s/w MD to change pantoprazole IV to PO
s/w MD to decrease warfarin dose from 5mg to 3mg based on INR of 3.05

## 2021-11-05 NOTE — PROGRESS NOTE ADULT - SUBJECTIVE AND OBJECTIVE BOX
INFECTIOUS DISEASES AND INTERNAL MEDICINE at [facility]  =======================================================  Wan Raya MD  Diplomates American Board of Internal Medicine and Infectious Diseases  Telephone [phone]  Fax            [phone]  =======================================================    RASHAUN FLORES [id number]    Follow up: AMS  BACTEREMIA ESBL    Allergies:  No Known Allergies      Medications:  chlorhexidine 4% Liquid 1 Application(s) Topical <User Schedule>  heparin   Injectable 8000 Unit(s) IV Push every 6 hours PRN  heparin   Injectable 4000 Unit(s) IV Push every 6 hours PRN  heparin  Infusion.  Unit(s)/Hr IV Continuous <Continuous>  lactated ringers. 1000 milliLiter(s) IV Continuous <Continuous>  levETIRAcetam  IVPB 1000 milliGRAM(s) IV Intermittent every 12 hours  meropenem  IVPB 1000 milliGRAM(s) IV Intermittent every 12 hours  pantoprazole  Injectable 40 milliGRAM(s) IV Push daily  petrolatum Ophthalmic Ointment 1 Application(s) Both EYES every 6 hours  potassium chloride  20 mEq/100 mL IVPB 20 milliEquivalent(s) IV Intermittent every 2 hours  valproate sodium IVPB 500 milliGRAM(s) IV Intermittent every 12 hours    SOCIAL       FAMILY   FAMILY HISTORY:  FH: stroke  dad    FH: diabetes mellitus  mom    Family history of hepatitis C  mom      REVIEW OF SYSTEMS:  CONSTITUTIONAL:  No Fever or chills  HEENT:   No diplopia or blurred vision.  No earache, sore throat or runny nose.  CARDIOVASCULAR:  No pressure, squeezing, strangling, tightness, heaviness or aching about the chest, neck, axilla or epigastrium.  RESPIRATORY:  No cough, shortness of breath, PND or orthopnea.  GASTROINTESTINAL:  No nausea, vomiting or diarrhea.  GENITOURINARY:  No dysuria, frequency or urgency. No Blood in urine  MUSCULOSKELETAL:   moves all joints  SKIN:  No change in skin, hair or nails.  NEUROLOGIC:  MORE AWAKE ANSWERS   QUESTIONS         Physical Exam:  I  Vital Signs Last 24 Hrs  T(C): 36.8 (05 Nov 2021 04:17), Max: 36.8 (05 Nov 2021 04:17)  T(F): 98.2 (05 Nov 2021 04:17), Max: 98.2 (05 Nov 2021 04:17)  HR: 107 (05 Nov 2021 04:17) (106 - 107)  BP: 118/70 (05 Nov 2021 04:17) (118/70 - 135/78)  BP(mean): --  RR: 18 (05 Nov 2021 04:17) (18 - 18)  SpO2: 94% (05 Nov 2021 04:17) (94% - 100%)    GEN: NAD,  AWAKE  ANSWERS  QUESTIONS   HEENT: normocephalic and atraumatic. EOMI. RAYNE.    NECK: Supple. No carotid bruits.  No lymphadenopathy or thyromegaly.  LUNGS: Clear to auscultation.  HEART: Regular rate and rhythm without murmur.  ABDOMEN: Soft, nontender, and nondistended.  Positive bowel sounds.    : No CVA tenderness  EXTREMITIES: Without any cyanosis, clubbing, rash, lesions or edema.  MSK: no joint swelling  NEUROLOGIC:  PAPRAPLEGIA        Labs:  Vitals:  ===     =======================================================  Current Antibiotics:  meropenem  IVPB 1000 milliGRAM(s) IV Intermittent every 12 hours    Other medications:  chlorhexidine 4% Liquid 1 Application(s) Topical <User Schedule>  heparin  Infusion.  Unit(s)/Hr IV Continuous <Continuous>  lactated ringers. 1000 milliLiter(s) IV Continuous <Continuous>  levETIRAcetam  IVPB 1000 milliGRAM(s) IV Intermittent every 12 hours  pantoprazole  Injectable 40 milliGRAM(s) IV Push daily  petrolatum Ophthalmic Ointment 1 Application(s) Both EYES every 6 hours  potassium chloride  20 mEq/100 mL IVPB 20 milliEquivalent(s) IV Intermittent every 2 hours  valproate sodium IVPB 500 milliGRAM(s) IV Intermittent every 12 hours      =======================================================  Labs:                                  7.1    22.56 )-----------( 374      ( 05 Nov 2021 08:11 )             22.6   11-05    143  |  107  |  17.2  ----------------------------<  102<H>  4.3   |  22.0  |  1.41<H>    Ca    7.8<L>      05 Nov 2021 08:11  Phos  4.3     11-05  Mg     2.0     11-05    TPro  5.7<L>  /  Alb  2.8<L>  /  TBili  0.3<L>  /  DBili  x   /  AST  11  /  ALT  8   /  AlkPhos  93  11-05              WBC Count: 18.57 K/uL (10-31-21 @ 04:17)  WBC Count: 20.96 K/uL (10-30-21 @ 07:08)          Alkaline Phosphatase, Serum: 110 U/L (10-31-21 @ 04:17)  Alkaline Phosphatase, Serum: 118 U/L (10-30-21 @ 07:08)  Alanine Aminotransferase (ALT/SGPT): 18 U/L (10-31-21 @ 04:17)  Alanine Aminotransferase (ALT/SGPT): 22 U/L (10-30-21 @ 07:08)  Aspartate Aminotransferase (AST/SGOT): 18 U/L (10-31-21 @ 04:17)  Aspartate Aminotransferase (AST/SGOT): 31 U/L (10-30-21 @ 07:08)  Bilirubin Total, Serum: 0.3 mg/dL (10-31-21 @ 04:17)  Bilirubin Total, Serum: 0.3 mg/dL (10-30-21 @ 07:08)

## 2021-11-05 NOTE — CONSULT NOTE ADULT - CONSULT REASON
Right thigh hematoma
" Obtunded, DNR/DNI, [place] [facility] [place]"
R/O INFECTIOUS PROCESS
seizure

## 2021-11-06 LAB
ALBUMIN SERPL ELPH-MCNC: 2.5 G/DL — LOW (ref 3.3–5.2)
ALP SERPL-CCNC: 125 U/L — HIGH (ref 40–120)
ALT FLD-CCNC: 11 U/L — SIGNIFICANT CHANGE UP
ANION GAP SERPL CALC-SCNC: 14 MMOL/L — SIGNIFICANT CHANGE UP (ref 5–17)
APTT BLD: 39.6 SEC — HIGH (ref 27.5–35.5)
AST SERPL-CCNC: 28 U/L — SIGNIFICANT CHANGE UP
BASOPHILS # BLD AUTO: 0.15 K/UL — SIGNIFICANT CHANGE UP (ref 0–0.2)
BASOPHILS NFR BLD AUTO: 0.5 % — SIGNIFICANT CHANGE UP (ref 0–2)
BILIRUB SERPL-MCNC: 0.4 MG/DL — SIGNIFICANT CHANGE UP (ref 0.4–2)
BUN SERPL-MCNC: 18.7 MG/DL — SIGNIFICANT CHANGE UP (ref 8–20)
CALCIUM SERPL-MCNC: 7.6 MG/DL — LOW (ref 8.6–10.2)
CHLORIDE SERPL-SCNC: 106 MMOL/L — SIGNIFICANT CHANGE UP (ref 98–107)
CO2 SERPL-SCNC: 20 MMOL/L — LOW (ref 22–29)
CREAT SERPL-MCNC: 1.28 MG/DL — SIGNIFICANT CHANGE UP (ref 0.5–1.3)
EOSINOPHIL # BLD AUTO: 0.13 K/UL — SIGNIFICANT CHANGE UP (ref 0–0.5)
EOSINOPHIL NFR BLD AUTO: 0.4 % — SIGNIFICANT CHANGE UP (ref 0–6)
GLUCOSE SERPL-MCNC: 87 MG/DL — SIGNIFICANT CHANGE UP (ref 70–99)
HCT VFR BLD CALC: 20.6 % — CRITICAL LOW (ref 34.5–45)
HCT VFR BLD CALC: 25.1 % — LOW (ref 34.5–45)
HCT VFR BLD CALC: 25.2 % — LOW (ref 34.5–45)
HGB BLD-MCNC: 7.1 G/DL — LOW (ref 11.5–15.5)
HGB BLD-MCNC: 8.3 G/DL — LOW (ref 11.5–15.5)
HGB BLD-MCNC: 8.3 G/DL — LOW (ref 11.5–15.5)
IMM GRANULOCYTES NFR BLD AUTO: 5.1 % — HIGH (ref 0–1.5)
INR BLD: 3.71 RATIO — HIGH (ref 0.88–1.16)
LYMPHOCYTES # BLD AUTO: 2.52 K/UL — SIGNIFICANT CHANGE UP (ref 1–3.3)
LYMPHOCYTES # BLD AUTO: 7.6 % — LOW (ref 13–44)
MCHC RBC-ENTMCNC: 28.3 PG — SIGNIFICANT CHANGE UP (ref 27–34)
MCHC RBC-ENTMCNC: 28.4 PG — SIGNIFICANT CHANGE UP (ref 27–34)
MCHC RBC-ENTMCNC: 29 PG — SIGNIFICANT CHANGE UP (ref 27–34)
MCHC RBC-ENTMCNC: 32.9 GM/DL — SIGNIFICANT CHANGE UP (ref 32–36)
MCHC RBC-ENTMCNC: 33.1 GM/DL — SIGNIFICANT CHANGE UP (ref 32–36)
MCHC RBC-ENTMCNC: 34.5 GM/DL — SIGNIFICANT CHANGE UP (ref 32–36)
MCV RBC AUTO: 82.1 FL — SIGNIFICANT CHANGE UP (ref 80–100)
MCV RBC AUTO: 86 FL — SIGNIFICANT CHANGE UP (ref 80–100)
MCV RBC AUTO: 88.1 FL — SIGNIFICANT CHANGE UP (ref 80–100)
MONOCYTES # BLD AUTO: 1.72 K/UL — HIGH (ref 0–0.9)
MONOCYTES NFR BLD AUTO: 5.2 % — SIGNIFICANT CHANGE UP (ref 2–14)
NEUTROPHILS # BLD AUTO: 26.99 K/UL — HIGH (ref 1.8–7.4)
NEUTROPHILS NFR BLD AUTO: 81.2 % — HIGH (ref 43–77)
PLATELET # BLD AUTO: 223 K/UL — SIGNIFICANT CHANGE UP (ref 150–400)
PLATELET # BLD AUTO: 369 K/UL — SIGNIFICANT CHANGE UP (ref 150–400)
PLATELET # BLD AUTO: 395 K/UL — SIGNIFICANT CHANGE UP (ref 150–400)
POTASSIUM SERPL-MCNC: 5.1 MMOL/L — SIGNIFICANT CHANGE UP (ref 3.5–5.3)
POTASSIUM SERPL-SCNC: 5.1 MMOL/L — SIGNIFICANT CHANGE UP (ref 3.5–5.3)
PROT SERPL-MCNC: 5.9 G/DL — LOW (ref 6.6–8.7)
PROTHROM AB SERPL-ACNC: 40.4 SEC — HIGH (ref 10.6–13.6)
RBC # BLD: 2.51 M/UL — LOW (ref 3.8–5.2)
RBC # BLD: 2.86 M/UL — LOW (ref 3.8–5.2)
RBC # BLD: 2.92 M/UL — LOW (ref 3.8–5.2)
RBC # FLD: 14.6 % — HIGH (ref 10.3–14.5)
RBC # FLD: 15.1 % — HIGH (ref 10.3–14.5)
RBC # FLD: 15.3 % — HIGH (ref 10.3–14.5)
SODIUM SERPL-SCNC: 140 MMOL/L — SIGNIFICANT CHANGE UP (ref 135–145)
WBC # BLD: 26.92 K/UL — HIGH (ref 3.8–10.5)
WBC # BLD: 33.21 K/UL — HIGH (ref 3.8–10.5)
WBC # BLD: 33.61 K/UL — HIGH (ref 3.8–10.5)
WBC # FLD AUTO: 26.92 K/UL — HIGH (ref 3.8–10.5)
WBC # FLD AUTO: 33.21 K/UL — HIGH (ref 3.8–10.5)
WBC # FLD AUTO: 33.61 K/UL — HIGH (ref 3.8–10.5)

## 2021-11-06 PROCEDURE — 93970 EXTREMITY STUDY: CPT | Mod: 26

## 2021-11-06 PROCEDURE — 95720 EEG PHY/QHP EA INCR W/VEEG: CPT

## 2021-11-06 PROCEDURE — 99233 SBSQ HOSP IP/OBS HIGH 50: CPT

## 2021-11-06 RX ADMIN — OXYCODONE HYDROCHLORIDE 10 MILLIGRAM(S): 5 TABLET ORAL at 06:53

## 2021-11-06 RX ADMIN — PANTOPRAZOLE SODIUM 40 MILLIGRAM(S): 20 TABLET, DELAYED RELEASE ORAL at 06:48

## 2021-11-06 RX ADMIN — LEVETIRACETAM 750 MILLIGRAM(S): 250 TABLET, FILM COATED ORAL at 17:08

## 2021-11-06 RX ADMIN — MEROPENEM 100 MILLIGRAM(S): 1 INJECTION INTRAVENOUS at 06:48

## 2021-11-06 RX ADMIN — MUPIROCIN 1 APPLICATION(S): 20 OINTMENT TOPICAL at 06:48

## 2021-11-06 RX ADMIN — Medication 325 MILLIGRAM(S): at 06:48

## 2021-11-06 RX ADMIN — CHLORHEXIDINE GLUCONATE 1 APPLICATION(S): 213 SOLUTION TOPICAL at 06:00

## 2021-11-06 RX ADMIN — POLYETHYLENE GLYCOL 3350 17 GRAM(S): 17 POWDER, FOR SOLUTION ORAL at 21:42

## 2021-11-06 RX ADMIN — MEROPENEM 100 MILLIGRAM(S): 1 INJECTION INTRAVENOUS at 21:43

## 2021-11-06 RX ADMIN — SENNA PLUS 2 TABLET(S): 8.6 TABLET ORAL at 21:42

## 2021-11-06 RX ADMIN — Medication 30 MILLIGRAM(S): at 12:21

## 2021-11-06 RX ADMIN — Medication 1 MILLIGRAM(S): at 12:22

## 2021-11-06 RX ADMIN — Medication 1 APPLICATION(S): at 12:22

## 2021-11-06 RX ADMIN — Medication 1 APPLICATION(S): at 17:09

## 2021-11-06 RX ADMIN — Medication 1 APPLICATION(S): at 06:53

## 2021-11-06 RX ADMIN — LEVETIRACETAM 750 MILLIGRAM(S): 250 TABLET, FILM COATED ORAL at 06:48

## 2021-11-06 RX ADMIN — Medication 1 TABLET(S): at 12:21

## 2021-11-06 RX ADMIN — OXYCODONE HYDROCHLORIDE 10 MILLIGRAM(S): 5 TABLET ORAL at 22:50

## 2021-11-06 RX ADMIN — OXYCODONE HYDROCHLORIDE 10 MILLIGRAM(S): 5 TABLET ORAL at 07:23

## 2021-11-06 RX ADMIN — MEROPENEM 100 MILLIGRAM(S): 1 INJECTION INTRAVENOUS at 14:50

## 2021-11-06 RX ADMIN — OXYCODONE HYDROCHLORIDE 10 MILLIGRAM(S): 5 TABLET ORAL at 21:50

## 2021-11-06 RX ADMIN — Medication 1 APPLICATION(S): at 01:34

## 2021-11-06 RX ADMIN — MUPIROCIN 1 APPLICATION(S): 20 OINTMENT TOPICAL at 17:09

## 2021-11-06 RX ADMIN — Medication 325 MILLIGRAM(S): at 17:08
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
## 2021-11-06 NOTE — PROGRESS NOTE ADULT - SUBJECTIVE AND OBJECTIVE BOX
SUBJECTIVE:    Patient evaluated at bedside. No acute distress. No acute events overnight. Remains hemodynamically stable and afebrile.     Vital Signs Last 24 Hrs  T(C): 36.9 (06 Nov 2021 04:11), Max: 37 (05 Nov 2021 20:19)  T(F): 98.4 (06 Nov 2021 04:11), Max: 98.6 (05 Nov 2021 20:19)  HR: 98 (06 Nov 2021 06:23) (91 - 117)  BP: 133/59 (06 Nov 2021 06:23) (90/59 - 133/59)  BP(mean): 71 (06 Nov 2021 06:23) (71 - 88)  RR: 19 (06 Nov 2021 06:23) (16 - 21)  SpO2: 100% (06 Nov 2021 06:23) (95% - 100%)    PHYSICAL EXAM:  GENERAL: In no acute distress, at times mumbling to herself about current events  RESPIRATORY: Nonlabored on RA  CARDIOVASCULAR: RRR   GASTROINTESTINAL: Abdomen soft, NT, ND, -R/-G.  No pulsatile mass, no flank tenderness or suprapubic tenderness. No hepatosplenomegaly.  MUSCULOSKELETAL: Hematoma noted on posterior R thigh, non-tender, no skin changes noted                      I&O's Summary    05 Nov 2021 07:01  -  06 Nov 2021 07:00  --------------------------------------------------------  IN: 0 mL / OUT: 1300 mL / NET: -1300 mL      I&O's Detail    05 Nov 2021 07:01  -  06 Nov 2021 07:00  --------------------------------------------------------  IN:  Total IN: 0 mL    OUT:    Indwelling Catheter - Urethral (mL): 1000 mL    Voided (mL): 300 mL  Total OUT: 1300 mL    Total NET: -1300 mL          MEDICATIONS  (STANDING):  chlorhexidine 4% Liquid 1 Application(s) Topical <User Schedule>  ferrous    sulfate 325 milliGRAM(s) Oral two times a day  folic acid 1 milliGRAM(s) Oral daily  levETIRAcetam 750 milliGRAM(s) Oral two times a day  meropenem  IVPB 1000 milliGRAM(s) IV Intermittent every 8 hours  multivitamin 1 Tablet(s) Oral daily  mupirocin 2% Nasal 1 Application(s) Nasal every 12 hours  pantoprazole   Suspension 40 milliGRAM(s) Oral before breakfast  PARoxetine 30 milliGRAM(s) Oral daily  petrolatum Ophthalmic Ointment 1 Application(s) Both EYES every 6 hours  polyethylene glycol 3350 17 Gram(s) Oral at bedtime  senna 2 Tablet(s) Oral at bedtime    MEDICATIONS  (PRN):  oxyCODONE    IR 10 milliGRAM(s) Oral every 6 hours PRN Severe Pain (7 - 10)  oxyCODONE    IR 5 milliGRAM(s) Oral every 6 hours PRN Moderate Pain (4 - 6)      LABS:                        8.3    33.21 )-----------( 369      ( 06 Nov 2021 07:42 )             25.1     11-06    140  |  106  |  18.7  ----------------------------<  87  5.1   |  20.0<L>  |  1.28    Ca    7.6<L>      06 Nov 2021 06:48  Phos  4.3     11-05  Mg     2.0     11-05    TPro  5.9<L>  /  Alb  2.5<L>  /  TBili  0.4  /  DBili  x   /  AST  28  /  ALT  11  /  AlkPhos  125<H>  11-06    PT/INR - ( 06 Nov 2021 06:45 )   PT: 40.4 sec;   INR: 3.71 ratio         PTT - ( 06 Nov 2021 06:45 )  PTT:39.6 sec      RADIOLOGY & ADDITIONAL STUDIES:

## 2021-11-06 NOTE — PROVIDER CONTACT NOTE (CRITICAL VALUE NOTIFICATION) - NS PROVIDER READ BACK
Medicare Wellness Visit  Plan for Preventive Care    A good way for you to stay healthy is to use preventive care.  Medicare covers many services that can help you stay healthy.* The goal of these services is to find any health problems as quickly as possible. Finding problems early can help make them easier to treat.  Your personal plan below lists the services you may need and when they are due.     Health Maintenance Summary     Topic Due On Due Status Completed On    MAMMOGRAM - BREAST CANCER SCREENING Mar 9, 2017 Overdue Mar 9, 2015    Colorectal Cancer Screening - Colonoscopy May 5, 2024 Not Due May 5, 2014    Pap Smear - Cervical Cancer Screening  Jun 8, 2021 Not Due Jun 8, 2016    Immunization - TDAP Pregnancy  Hidden     IMMUNIZATION - DTaP/Tdap/Td Apr 11, 2026 Not Due Apr 11, 2016    Immunization-Influenza  Completed Oct 31, 2016           Preventive Care for Women and Men    Heart Screenings (Cardiovascular):  · Blood tests are used to check your cholesterol, lipid and triglyceride levels. High levels can increase your risk for heart disease and stroke. High levels can be treated with medications, diet and exercise. Lowering your levels can help keep your heart and blood vessels healthy.  Your provider will order these tests if they are needed.    · An ultrasound is done to see if you have an abdominal aortic aneurysm (AAA).  This is an enlargement of one of the main blood vessels that delivers blood to the body.   In the [place], 9,000 deaths are caused by AAA.  You may not even know you have this problem and as many as 1 in 3 people will have a serious problem if it is not treated.  Early diagnosis allows for more effective treatment and cure.  If you have a family history of AAA or are a male age 65-75 who has smoked, you are at higher risk of an AAA.  Your provider can order this test, if needed.    Colorectal Screening:  · There are many tests that are used to check for cancer of your colon  and rectum. You and your provider should discuss what test is best for you and when to have it done.  Options include:  · Screening Colonoscopy: exam of the entire colon, seen through a flexible lighted tube.  · Flexible Sigmoidoscopy: exam of the last third (sigmoid portion) of the colon and rectum, seen through a flexible lighted tube.  · Cologuard DNA stool test: a sample of your stool is used to screen for cancer and unseen blood in your stool.  · Fecal Occult Blood Test: a sample of your stool is studied to find any unseen blood    Flu Shot:  · An immunization that helps to prevent influenza (the flu). You should get this every year. The best time to get the shot is in the fall.    Pneumococcal Shot:  • Vaccines are available that can help prevent pneumococcal disease, which is any type of infection caused by Streptococcus pneumoniae bacteria.   Their use can prevent some cases of pneumonia, meningitis, and sepsis. There are two types of pneumococcal vaccines:   o Conjugate vaccines (PCV-13 or Prevnar 13®) - helps protect against the 13 types of pneumococcal bacteria that are the most common causes of serious infections in children and adults.    o Polysaccharide vaccine (PPSV23 or [id number]®) - helps protect against 23 types of pneumococcal bacteria for patients who are recommended to get it.  These vaccines should be given at least 12 months apart.  A booster is usually not needed.         Hepatitis B Shot:  · An immunization that helps to protect people from getting Hepatitis B. Hepatitis B is a virus that spreads through contact with infected blood or body fluids. Many people with the virus do not have symptoms.  The virus can lead to serious problems, such as liver disease. Some people are at higher risk than others. Your doctor will tell you if you need this shot.     Diabetes Screening:  · A test to measure sugar (glucose) in your blood is called a fasting blood sugar. Fasting means you cannot have  food or drink for at least 8 hours before the test. This test can detect diabetes long before you may notice symptoms.    Glaucoma Screening:  · Glaucoma screening is performed by your eye doctor. The test measures the fluid pressure inside your eyes to determine if you have glaucoma.     Hepatitis C Screening:  · A blood test to see if you have the hepatitis C virus.  Hepatitis C attacks the liver and is a major cause of chronic liver disease.  Medicare will cover a single screening for all adults born between 1945 & 1965, or high risk patients (people who have injected illegal drugs or people who have had blood transfusions).  High risk patients who continue to inject illegal drugs can be screened for Hepatitis C every year.    Smoking and Tobacco-Use Cessation Counseling:  · Tobacco is the single greatest cause of disease and early death in our country today. Medication and counseling together can increase a person’s chance of quitting for good.   · Medicare covers two quitting attempts per year, with four counseling sessions per attempt (eight sessions in a 12 month period)    Preventive Screening tests for Women    Screening Mammograms and Breast Exams:  · An x-ray of your breasts to check for breast cancer before you or your doctor may be able to feel it.  If breast cancer is found early it can usually be treated with success.    Pelvic Exams and Pap Tests:  · An exam to check for cervical and vaginal cancer. A Pap test is a lab test in which cells are taken from your cervix and sent to the lab to look for signs of cervical cancer. If cancer of the cervix is found early, chances for a cure are good. Testing can generally end at age 65, or if a woman has a hysterectomy for a benign condition. Your provider may recommend more frequent testing if certain abnormal results are found.    Bone Mass Measurements:  · A painless x-ray of your bone density to see if you are at risk for a broken bone. Bone density refers  yes to the thickness of bones or how tightly the bone tissue is packed.    Preventive Screening tests for Men    Prostate Screening:  · PSA - Prostate Cancer blood test.  Experts do not recommend routine screening of healthy men with no signs or symptoms of prostate disease.  However, men should not ignore urinary symptoms, and should discuss their family history with their doctor.    *Medicare pays for many preventive services to keep you healthy. For some of these services, you might have to pay a deductible, coinsurance, and / or copayment.  The amounts vary depending on the type of services you need and the kind of Medicare health plan you have.

## 2021-11-06 NOTE — PROVIDER CONTACT NOTE (CRITICAL VALUE NOTIFICATION) - SITUATION
Hct of 20.6 critical lab value. As per PA will review chart; possible order of 1 unit of PRBC to be ordered. Awaiting further orders at this time.

## 2021-11-06 NOTE — PROGRESS NOTE ADULT - SUBJECTIVE AND OBJECTIVE BOX
a litle confused but no pain rt thigh  afebru=ile   rt thigh soft nontender no eechymosis  hct 25   monitor hct   supportive care

## 2021-11-06 NOTE — PROGRESS NOTE ADULT - ASSESSMENT
46F R thigh hematoma while on coumadin for DVT, Hb6.0 receiving 2u PRBC, sinus tachyardia but normotensive    - No intervention from general surgery for thigh hematoma  - Repeat LE Duplex, if no evidence of DVT can consider holding AC  -q6h h/h and transfuse prbc for hb >7.0  -Hold coumadin and any AC if risk allows by primary team, can consult IR for IVC filter  -If Hb continues to down trend can consult Interventional Radiology for possible embolization

## 2021-11-06 NOTE — CHART NOTE - NSCHARTNOTEFT_GEN_A_CORE
Patient with R thigh hematoma while on coumadin for DVT  Hb dropped to 6.0 11/5 and patient received 2U PRBCs  Hemoglobin improved to 8.3 at 11:17 today   Called with critical value H&H 7.1/20.6 at 21:52   In light of 1.3 point drop will transfuse 1U PRBCs   Patient is hemodynamically stable at this time   RN to notify with any acute changes Pre-visit Chart review notification/  Spoke with pt about overdue HM screenings. Scheduled eye camera appt for 09/01/20 at [facility] at 1000, order linked at this time. Pt stated that she is scheduled for a Colonoscopy at [facility] on 08/27/. Cancelled appt with Dr Soriano for a CCS on 09/01/20.

## 2021-11-07 LAB
APTT BLD: 44.3 SEC — HIGH (ref 27.5–35.5)
HCT VFR BLD CALC: 24.5 % — LOW (ref 34.5–45)
HGB BLD-MCNC: 8 G/DL — LOW (ref 11.5–15.5)
INR BLD: 3.99 RATIO — HIGH (ref 0.88–1.16)
PROTHROM AB SERPL-ACNC: 43.3 SEC — HIGH (ref 10.6–13.6)

## 2021-11-07 PROCEDURE — 95718 EEG PHYS/QHP 2-12 HR W/VEEG: CPT

## 2021-11-07 PROCEDURE — 99233 SBSQ HOSP IP/OBS HIGH 50: CPT

## 2021-11-07 RX ADMIN — OXYCODONE HYDROCHLORIDE 10 MILLIGRAM(S): 5 TABLET ORAL at 05:54

## 2021-11-07 RX ADMIN — Medication 30 MILLIGRAM(S): at 10:38

## 2021-11-07 RX ADMIN — Medication 325 MILLIGRAM(S): at 05:54

## 2021-11-07 RX ADMIN — Medication 1 APPLICATION(S): at 00:11

## 2021-11-07 RX ADMIN — Medication 1 TABLET(S): at 10:38

## 2021-11-07 RX ADMIN — LEVETIRACETAM 750 MILLIGRAM(S): 250 TABLET, FILM COATED ORAL at 18:22

## 2021-11-07 RX ADMIN — Medication 1 MILLIGRAM(S): at 10:38

## 2021-11-07 RX ADMIN — MEROPENEM 100 MILLIGRAM(S): 1 INJECTION INTRAVENOUS at 05:55

## 2021-11-07 RX ADMIN — OXYCODONE HYDROCHLORIDE 10 MILLIGRAM(S): 5 TABLET ORAL at 08:10

## 2021-11-07 RX ADMIN — MUPIROCIN 1 APPLICATION(S): 20 OINTMENT TOPICAL at 18:23

## 2021-11-07 RX ADMIN — OXYCODONE HYDROCHLORIDE 10 MILLIGRAM(S): 5 TABLET ORAL at 22:26

## 2021-11-07 RX ADMIN — Medication 1 APPLICATION(S): at 10:38

## 2021-11-07 RX ADMIN — Medication 325 MILLIGRAM(S): at 18:22

## 2021-11-07 RX ADMIN — Medication 1 APPLICATION(S): at 05:56

## 2021-11-07 RX ADMIN — MUPIROCIN 1 APPLICATION(S): 20 OINTMENT TOPICAL at 05:54

## 2021-11-07 RX ADMIN — Medication 1 APPLICATION(S): at 18:24

## 2021-11-07 RX ADMIN — MEROPENEM 100 MILLIGRAM(S): 1 INJECTION INTRAVENOUS at 18:21

## 2021-11-07 RX ADMIN — SENNA PLUS 2 TABLET(S): 8.6 TABLET ORAL at 22:21

## 2021-11-07 RX ADMIN — OXYCODONE HYDROCHLORIDE 10 MILLIGRAM(S): 5 TABLET ORAL at 22:56

## 2021-11-07 RX ADMIN — POLYETHYLENE GLYCOL 3350 17 GRAM(S): 17 POWDER, FOR SOLUTION ORAL at 22:21

## 2021-11-07 RX ADMIN — LEVETIRACETAM 750 MILLIGRAM(S): 250 TABLET, FILM COATED ORAL at 05:54

## 2021-11-07 RX ADMIN — PANTOPRAZOLE SODIUM 40 MILLIGRAM(S): 20 TABLET, DELAYED RELEASE ORAL at 05:55

## 2021-11-07 NOTE — PROGRESS NOTE ADULT - SUBJECTIVE AND OBJECTIVE BOX
RASHAUN FLORES Patient is a 46y old  Female who presents with a chief complaint of Transfer for EEG (07 Nov 2021 05:03)     HPI:  46F w/ PMHx TBI, functional paraplegia, W/C bound at baseline, C spine fixation, s/p trach/ PEG (now decannulated), seizure d/o, urinary retention w/ indwelling arthur, DVT on Coumadin, ESBL E Coli bacteriemia was sent over from [place] to [facility] [place] w/ AMS and now transferred to [facility] for cvEEG. On presentation to [facility] [place] she was altered, hypotensive, febrile 101 and in DIEUDONNE w/ a supra therapeutic INR ~ 9.5. Code stroke was called and stat CTH was grossly normal. Later a code sepsis was called and pt was resuscitated and given IV Abx. Her neuro status further worsened, and she became unresponsive to noxious stimuli. Not intubated as he was a DNR/DNI. Pt was loaded w/ Keppra and another CTH was requested which was also WNLs. A 3rd CTH was obtained 6hrs later which was also normal.   Over the next 24hrs she was on and off pressors and weaned of VM to RA. INR came down to 2.7 after Vit K. EEG was suggestive of a catastrophic neurological insult w/ B/L GPEDs. BCx were positive for ESBL E Coli and Meropenem was switched to Ayvcaz as per ID recs. Pt was loaded w/ VA 2g prior to transfer to [facility]  (30 Oct 2021 05:02)    The patient was seen and evaluated   The patient is in no acute distress.  Denied any fever chest pain, palpitations, shortness of breath, abdominal pain, fever, dysuria, cough, Complains of "stop touching me and all these people here are just staring at me"- NO one is in the room- patient appears to have hallucinations - possible delusions    I&O's Summary    06 Nov 2021 08:01  -  07 Nov 2021 07:00  --------------------------------------------------------  IN: 906 mL / OUT: 1420 mL / NET: -514 mL      Allergies    No Known Allergies    Intolerances      HEALTH ISSUES - PROBLEM Dx:        PAST MEDICAL & SURGICAL HISTORY:  TBI (traumatic brain injury)    History of paraplegia    DVT, lower extremity    Chronic neck pain with history of cervical spinal surgery    History of Lorenza-en-Y gastric bypass            Vital Signs Last 24 Hrs  T(C): 36.6 (07 Nov 2021 08:00), Max: 36.8 (06 Nov 2021 16:00)  T(F): 97.8 (07 Nov 2021 08:00), Max: 98.3 (06 Nov 2021 16:00)  HR: 77 (07 Nov 2021 08:00) (69 - 100)  BP: 107/67 (07 Nov 2021 08:00) (101/53 - 129/51)  BP(mean): 76 (07 Nov 2021 05:50) (65 - 76)  RR: 17 (07 Nov 2021 08:00) (16 - 22)  SpO2: 100% (07 Nov 2021 08:00) (96% - 100%)T(C): 36.6 (11-07-21 @ 08:00), Max: 36.8 (11-06-21 @ 16:00)  HR: 77 (11-07-21 @ 08:00) (69 - 100)  BP: 107/67 (11-07-21 @ 08:00) (101/53 - 129/51)  RR: 17 (11-07-21 @ 08:00) (16 - 22)  SpO2: 100% (11-07-21 @ 08:00) (96% - 100%)  Wt(kg): --    PHYSICAL EXAM:    GENERAL: NAD,confused??- hallucinating   HEAD:  Atraumatic, Normocephalic  EYES: EOMI, PERRL  NERVOUS SYSTEM:  Alert & randomly Moves upper and lower extremities; CNS-II-XII  CHEST/LUNG: Clear to auscultation bilaterally; No rales, rhonchi, wheezing,   HEART: Regular rate and rhythm; No murmurs,   ABDOMEN: Soft, Nontender, Nondistended; Bowel sounds present  EXTREMITIES:  Peripheral Pulses, No  cyanosis, or edema  psychiatry- unclear Insight and judgement intact     chlorhexidine 4% Liquid 1 Application(s) Topical <User Schedule>  ferrous    sulfate 325 milliGRAM(s) Oral two times a day  folic acid 1 milliGRAM(s) Oral daily  levETIRAcetam 750 milliGRAM(s) Oral two times a day  meropenem  IVPB 1000 milliGRAM(s) IV Intermittent every 8 hours  multivitamin 1 Tablet(s) Oral daily  mupirocin 2% Nasal 1 Application(s) Nasal every 12 hours  oxyCODONE    IR 5 milliGRAM(s) Oral every 6 hours PRN  oxyCODONE    IR 10 milliGRAM(s) Oral every 6 hours PRN  pantoprazole   Suspension 40 milliGRAM(s) Oral before breakfast  PARoxetine 30 milliGRAM(s) Oral daily  petrolatum Ophthalmic Ointment 1 Application(s) Both EYES every 6 hours  polyethylene glycol 3350 17 Gram(s) Oral at bedtime  senna 2 Tablet(s) Oral at bedtime      LABS:                          8.0    x     )-----------( x        ( 07 Nov 2021 08:20 )             24.5     11-06    140  |  106  |  18.7  ----------------------------<  87  5.1   |  20.0<L>  |  1.28    Ca    7.6<L>      06 Nov 2021 06:48    TPro  5.9<L>  /  Alb  2.5<L>  /  TBili  0.4  /  DBili  x   /  AST  28  /  ALT  11  /  AlkPhos  125<H>  11-06    LIVER FUNCTIONS - ( 06 Nov 2021 06:48 )  Alb: 2.5 g/dL / Pro: 5.9 g/dL / ALK PHOS: 125 U/L / ALT: 11 U/L / AST: 28 U/L / GGT: x           PT/INR - ( 07 Nov 2021 08:20 )   PT: 43.3 sec;   INR: 3.99 ratio         PTT - ( 07 Nov 2021 08:20 )  PTT:44.3 sec        CAPILLARY BLOOD GLUCOSE          RADIOLOGY & ADDITIONAL TESTS:      Consultant notes reviewed    Case discussed with consultant/provider/ family /patient  RASHAUN FLORES Patient is a 46y old  Female who presents with a chief complaint of Transfer for EEG (07 Nov 2021 05:03)     HPI:  46F w/ PMHx TBI, functional paraplegia, W/C bound at baseline, C spine fixation, s/p trach/ PEG (now decannulated), seizure d/o, urinary retention w/ indwelling arthur, DVT on Coumadin, ESBL E Coli bacteriemia was sent over from NH to [facility] [place] w/ AMS and now transferred to [facility] for cvEEG. On presentation to [facility] [place] she was altered, hypotensive, febrile 101 and in DIEUDONNE w/ a supra therapeutic INR ~ 9.5. Code stroke was called and stat CTH was grossly normal. Later a code sepsis was called and pt was resuscitated and given IV Abx. Her neuro status further worsened, and she became unresponsive to noxious stimuli. Not intubated as he was a DNR/DNI. Pt was loaded w/ Keppra and another CTH was requested which was also WNLs. A 3rd CTH was obtained 6hrs later which was also normal.   Over the next 24hrs she was on and off pressors and weaned of VM to RA. INR came down to 2.7 after Vit K. EEG was suggestive of a catastrophic neurological insult w/ B/L GPEDs. BCx were positive for ESBL E Coli and Meropenem was switched to Ayvcaz as per ID recs. Pt was loaded w/ VA 2g prior to transfer to [facility]  (30 Oct 2021 05:02)    The patient was seen and evaluated   The patient is in no acute distress.  Denied any fever chest pain, palpitations, shortness of breath, abdominal pain, fever, dysuria, cough, Complains of "stop touching me and all these people here are just staring at me"- NO one is in the room- patient appears to have hallucinations - possible delusions    I&O's Summary    06 Nov 2021 08:01  -  07 Nov 2021 07:00  --------------------------------------------------------  IN: 906 mL / OUT: 1420 mL / NET: -514 mL      Allergies    No Known Allergies    Intolerances      HEALTH ISSUES - PROBLEM Dx:        PAST MEDICAL & SURGICAL HISTORY:  TBI (traumatic brain injury)    History of paraplegia    DVT, lower extremity    Chronic neck pain with history of cervical spinal surgery    History of Lorenza-en-Y gastric bypass            Vital Signs Last 24 Hrs  T(C): 36.6 (07 Nov 2021 08:00), Max: 36.8 (06 Nov 2021 16:00)  T(F): 97.8 (07 Nov 2021 08:00), Max: 98.3 (06 Nov 2021 16:00)  HR: 77 (07 Nov 2021 08:00) (69 - 100)  BP: 107/67 (07 Nov 2021 08:00) (101/53 - 129/51)  BP(mean): 76 (07 Nov 2021 05:50) (65 - 76)  RR: 17 (07 Nov 2021 08:00) (16 - 22)  SpO2: 100% (07 Nov 2021 08:00) (96% - 100%)T(C): 36.6 (11-07-21 @ 08:00), Max: 36.8 (11-06-21 @ 16:00)  HR: 77 (11-07-21 @ 08:00) (69 - 100)  BP: 107/67 (11-07-21 @ 08:00) (101/53 - 129/51)  RR: 17 (11-07-21 @ 08:00) (16 - 22)  SpO2: 100% (11-07-21 @ 08:00) (96% - 100%)  Wt(kg): --    PHYSICAL EXAM:    GENERAL: NAD,confused??- hallucinating - functional paraplegia   HEAD:  Atraumatic, Normocephalic  EYES: EOMI, PERRL  NERVOUS SYSTEM:  Alert & randomly Moves upper extremities; CNS-II-XII  CHEST/LUNG: Clear to auscultation bilaterally; No rales, rhonchi, wheezing,   HEART: Regular rate and rhythm; No murmurs,   ABDOMEN: Soft, Nontender, Nondistended; Bowel sounds present  EXTREMITIES:  Peripheral Pulses, No  cyanosis, or edema  psychiatry- unclear Insight and judgement intact     chlorhexidine 4% Liquid 1 Application(s) Topical <User Schedule>  ferrous    sulfate 325 milliGRAM(s) Oral two times a day  folic acid 1 milliGRAM(s) Oral daily  levETIRAcetam 750 milliGRAM(s) Oral two times a day  meropenem  IVPB 1000 milliGRAM(s) IV Intermittent every 8 hours  multivitamin 1 Tablet(s) Oral daily  mupirocin 2% Nasal 1 Application(s) Nasal every 12 hours  oxyCODONE    IR 5 milliGRAM(s) Oral every 6 hours PRN  oxyCODONE    IR 10 milliGRAM(s) Oral every 6 hours PRN  pantoprazole   Suspension 40 milliGRAM(s) Oral before breakfast  PARoxetine 30 milliGRAM(s) Oral daily  petrolatum Ophthalmic Ointment 1 Application(s) Both EYES every 6 hours  polyethylene glycol 3350 17 Gram(s) Oral at bedtime  senna 2 Tablet(s) Oral at bedtime      LABS:                          8.0    x     )-----------( x        ( 07 Nov 2021 08:20 )             24.5     11-06    140  |  106  |  18.7  ----------------------------<  87  5.1   |  20.0<L>  |  1.28    Ca    7.6<L>      06 Nov 2021 06:48    TPro  5.9<L>  /  Alb  2.5<L>  /  TBili  0.4  /  DBili  x   /  AST  28  /  ALT  11  /  AlkPhos  125<H>  11-06    LIVER FUNCTIONS - ( 06 Nov 2021 06:48 )  Alb: 2.5 g/dL / Pro: 5.9 g/dL / ALK PHOS: 125 U/L / ALT: 11 U/L / AST: 28 U/L / GGT: x           PT/INR - ( 07 Nov 2021 08:20 )   PT: 43.3 sec;   INR: 3.99 ratio         PTT - ( 07 Nov 2021 08:20 )  PTT:44.3 sec        CAPILLARY BLOOD GLUCOSE          RADIOLOGY & ADDITIONAL TESTS:      Consultant notes reviewed    Case discussed with consultant/provider/ family /patient
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
## 2021-11-07 NOTE — EEG REPORT - NS EEG HISTORY CONTINUOUS VIDEO EEG SUG
evaluation of mental status change
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
## 2021-11-07 NOTE — PROGRESS NOTE ADULT - SUBJECTIVE AND OBJECTIVE BOX
HPI/OVERNIGHT EVENTS: Patient seen and examined at bedside this AM. Overnight patient dropped her H/H and got one unit of PRBC, remained hemodynamically stable. Denies fever, chills, nausea, vomiting, chest pain, SOB, dizziness, abd pain or any other concerning symptoms.    Vital Signs Last 24 Hrs  T(C): 36.4 (07 Nov 2021 03:00), Max: 36.9 (06 Nov 2021 09:23)  T(F): 97.6 (07 Nov 2021 03:00), Max: 98.4 (06 Nov 2021 09:23)  HR: 69 (07 Nov 2021 03:00) (69 - 107)  BP: 123/53 (07 Nov 2021 03:00) (101/53 - 137/62)  BP(mean): 69 (07 Nov 2021 03:00) (65 - 73)  RR: 18 (07 Nov 2021 03:00) (16 - 22)  SpO2: 100% (07 Nov 2021 03:00) (96% - 100%)    I&O's Detail    05 Nov 2021 07:01  -  06 Nov 2021 07:00  --------------------------------------------------------  IN:  Total IN: 0 mL    OUT:    Indwelling Catheter - Urethral (mL): 1000 mL    Voided (mL): 300 mL  Total OUT: 1300 mL    Total NET: -1300 mL      06 Nov 2021 08:01  -  07 Nov 2021 05:03  --------------------------------------------------------  IN:    IV PiggyBack: 50 mL    Oral Fluid: 540 mL    PRBCs (Packed Red Blood Cells): 316 mL  Total IN: 906 mL    OUT:    Indwelling Catheter - Urethral (mL): 845 mL    Voided (mL): 400 mL  Total OUT: 1245 mL    Total NET: -339 mL          Constitutional: patient resting comfortably in bed, in no acute distress  HEENT: EOMI, PERRLA, MMM.  Respiratory: Non labored breathing on RA  Cardiovascular: RRR  Gastrointestinal: Abdomen soft, non-tender, non-distended, no rebound tenderness / guarding  Musculoskeletal: Right thigh is soft and non tender. NO signs of compartment syndrome.   Vascular: Extremities warm and well perfused.     LABS:                        7.1    26.92 )-----------( 395      ( 06 Nov 2021 21:52 )             20.6     11-06    140  |  106  |  18.7  ----------------------------<  87  5.1   |  20.0<L>  |  1.28    Ca    7.6<L>      06 Nov 2021 06:48  Phos  4.3     11-05  Mg     2.0     11-05    TPro  5.9<L>  /  Alb  2.5<L>  /  TBili  0.4  /  DBili  x   /  AST  28  /  ALT  11  /  AlkPhos  125<H>  11-06    PT/INR - ( 06 Nov 2021 06:45 )   PT: 40.4 sec;   INR: 3.71 ratio         PTT - ( 06 Nov 2021 06:45 )  PTT:39.6 sec      MEDICATIONS  (STANDING):  chlorhexidine 4% Liquid 1 Application(s) Topical <User Schedule>  ferrous    sulfate 325 milliGRAM(s) Oral two times a day  folic acid 1 milliGRAM(s) Oral daily  levETIRAcetam 750 milliGRAM(s) Oral two times a day  meropenem  IVPB 1000 milliGRAM(s) IV Intermittent every 8 hours  multivitamin 1 Tablet(s) Oral daily  mupirocin 2% Nasal 1 Application(s) Nasal every 12 hours  pantoprazole   Suspension 40 milliGRAM(s) Oral before breakfast  PARoxetine 30 milliGRAM(s) Oral daily  petrolatum Ophthalmic Ointment 1 Application(s) Both EYES every 6 hours  polyethylene glycol 3350 17 Gram(s) Oral at bedtime  senna 2 Tablet(s) Oral at bedtime    MEDICATIONS  (PRN):  oxyCODONE    IR 10 milliGRAM(s) Oral every 6 hours PRN Severe Pain (7 - 10)  oxyCODONE    IR 5 milliGRAM(s) Oral every 6 hours PRN Moderate Pain (4 - 6)
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
## 2021-11-07 NOTE — PROGRESS NOTE ADULT - ASSESSMENT
46F R thigh hematoma while on coumadin for DVT, Hb6.0 receiving 2u PRBC, sinus tachyardia but normotensive. Patient dropped her H/H yesterday and and got 1U of PRBC, patient likely bleeding.     - No intervention from general surgery for thigh hematoma  - Consider consulting   - q6h h/h and transfuse prbc for hb >7.0  -Hold coumadin and any AC if risk allows by primary team, can consult IR for IVC filter. Patient has no DVT per US 11/6

## 2021-11-08 LAB
HCT VFR BLD CALC: 26.8 % — LOW (ref 34.5–45)
HGB BLD-MCNC: 8.5 G/DL — LOW (ref 11.5–15.5)
MCHC RBC-ENTMCNC: 27.4 PG — SIGNIFICANT CHANGE UP (ref 27–34)
MCHC RBC-ENTMCNC: 31.7 GM/DL — LOW (ref 32–36)
MCV RBC AUTO: 86.5 FL — SIGNIFICANT CHANGE UP (ref 80–100)
PLATELET # BLD AUTO: 498 K/UL — HIGH (ref 150–400)
RBC # BLD: 3.1 M/UL — LOW (ref 3.8–5.2)
RBC # FLD: 16.7 % — HIGH (ref 10.3–14.5)
WBC # BLD: 18.63 K/UL — HIGH (ref 3.8–10.5)
WBC # FLD AUTO: 18.63 K/UL — HIGH (ref 3.8–10.5)

## 2021-11-08 PROCEDURE — 99233 SBSQ HOSP IP/OBS HIGH 50: CPT

## 2021-11-08 RX ORDER — MORPHINE SULFATE 50 MG/1
2 CAPSULE, EXTENDED RELEASE ORAL ONCE
Refills: 0 | Status: DISCONTINUED | OUTPATIENT
Start: 2021-11-08 | End: 2021-11-08

## 2021-11-08 RX ORDER — ASCORBIC ACID 60 MG
500 TABLET,CHEWABLE ORAL DAILY
Refills: 0 | Status: DISCONTINUED | OUTPATIENT
Start: 2021-11-08 | End: 2021-11-17

## 2021-11-08 RX ADMIN — Medication 325 MILLIGRAM(S): at 16:47

## 2021-11-08 RX ADMIN — Medication 325 MILLIGRAM(S): at 06:23

## 2021-11-08 RX ADMIN — OXYCODONE HYDROCHLORIDE 10 MILLIGRAM(S): 5 TABLET ORAL at 22:10

## 2021-11-08 RX ADMIN — MORPHINE SULFATE 2 MILLIGRAM(S): 50 CAPSULE, EXTENDED RELEASE ORAL at 00:57

## 2021-11-08 RX ADMIN — Medication 30 MILLIGRAM(S): at 10:02

## 2021-11-08 RX ADMIN — MUPIROCIN 1 APPLICATION(S): 20 OINTMENT TOPICAL at 06:24

## 2021-11-08 RX ADMIN — MEROPENEM 100 MILLIGRAM(S): 1 INJECTION INTRAVENOUS at 17:05

## 2021-11-08 RX ADMIN — Medication 1 APPLICATION(S): at 10:02

## 2021-11-08 RX ADMIN — POLYETHYLENE GLYCOL 3350 17 GRAM(S): 17 POWDER, FOR SOLUTION ORAL at 22:13

## 2021-11-08 RX ADMIN — MEROPENEM 100 MILLIGRAM(S): 1 INJECTION INTRAVENOUS at 02:39

## 2021-11-08 RX ADMIN — MEROPENEM 100 MILLIGRAM(S): 1 INJECTION INTRAVENOUS at 09:59

## 2021-11-08 RX ADMIN — MORPHINE SULFATE 2 MILLIGRAM(S): 50 CAPSULE, EXTENDED RELEASE ORAL at 00:27

## 2021-11-08 RX ADMIN — Medication 1 MILLIGRAM(S): at 10:02

## 2021-11-08 RX ADMIN — LEVETIRACETAM 750 MILLIGRAM(S): 250 TABLET, FILM COATED ORAL at 06:23

## 2021-11-08 RX ADMIN — PANTOPRAZOLE SODIUM 40 MILLIGRAM(S): 20 TABLET, DELAYED RELEASE ORAL at 06:23

## 2021-11-08 RX ADMIN — Medication 1 APPLICATION(S): at 16:47

## 2021-11-08 RX ADMIN — Medication 1 APPLICATION(S): at 06:24

## 2021-11-08 RX ADMIN — SENNA PLUS 2 TABLET(S): 8.6 TABLET ORAL at 22:08

## 2021-11-08 RX ADMIN — Medication 1 TABLET(S): at 10:02

## 2021-11-08 RX ADMIN — LEVETIRACETAM 750 MILLIGRAM(S): 250 TABLET, FILM COATED ORAL at 16:47

## 2021-11-08 RX ADMIN — Medication 1 APPLICATION(S): at 00:32

## 2021-11-08 RX ADMIN — CHLORHEXIDINE GLUCONATE 1 APPLICATION(S): 213 SOLUTION TOPICAL at 06:24

## 2021-11-08 RX ADMIN — Medication 500 MILLIGRAM(S): at 16:49

## 2021-11-08 RX ADMIN — MUPIROCIN 1 APPLICATION(S): 20 OINTMENT TOPICAL at 16:47

## 2021-11-08 NOTE — CHART NOTE - NSCHARTNOTEFT_GEN_A_CORE
Source: Patient [ ]  Family [ ]   other [x ] pt sleeping; confusion noted    Current Diet: Diet, Pureed (10-31-21 @ 15:00)    PO intake:  Pt with poor po intake at meals per nursing flowsheets; however pt consumed ~50% at breakfast per tray observation at bedside this morning.  Pt requires assistance at meals noted.    Current Weight:   (11/7) 217.8 lbs  (10/30) 213.8 lbs    % Weight Change - no wt loss noted, will continue to monitor.  Aware pt with 1+ trace generalized edema noted per documentation.    Pertinent Medications: MEDICATIONS  (STANDING):  chlorhexidine 4% Liquid 1 Application(s) Topical <User Schedule>  ferrous    sulfate 325 milliGRAM(s) Oral two times a day  folic acid 1 milliGRAM(s) Oral daily  levETIRAcetam 750 milliGRAM(s) Oral two times a day  meropenem  IVPB 1000 milliGRAM(s) IV Intermittent every 8 hours  multivitamin 1 Tablet(s) Oral daily  mupirocin 2% Nasal 1 Application(s) Nasal every 12 hours  pantoprazole   Suspension 40 milliGRAM(s) Oral before breakfast  PARoxetine 30 milliGRAM(s) Oral daily  petrolatum Ophthalmic Ointment 1 Application(s) Both EYES every 6 hours  polyethylene glycol 3350 17 Gram(s) Oral at bedtime  senna 2 Tablet(s) Oral at bedtime    MEDICATIONS  (PRN):  oxyCODONE    IR 5 milliGRAM(s) Oral every 6 hours PRN Moderate Pain (4 - 6)  oxyCODONE    IR 10 milliGRAM(s) Oral every 6 hours PRN Severe Pain (7 - 10)    Pertinent Labs: CBC Full  -  ( 08 Nov 2021 06:40 )  WBC Count : 18.63 K/uL  RBC Count : 3.10 M/uL  Hemoglobin : 8.5 g/dL  Hematocrit : 26.8 %  Platelet Count - Automated : 498 K/uL  Mean Cell Volume : 86.5 fl  Mean Cell Hemoglobin : 27.4 pg  Mean Cell Hemoglobin Concentration : 31.7 gm/dL    Skin: Unstageable B/L ischial tuberosity in cleft and unstageable left thigh    LIMITED Nutrition focused physical exam conducted - found signs of malnutrition [x ]absent where able to observe while pt sleeping [ ]present    Subcutaneous fat loss: [ ] Orbital fat pads region, [ ]Buccal fat region, [ ]Triceps region,  [ ]Ribs region    Muscle wasting: [ ]Temples region, [ ]Clavicle region, [ ]Shoulder region, [ ]Scapula region, [ ]Interosseous region,  [ ]thigh region, [ ]Calf region    Current Nutrition Diagnosis: Pt remains at nutrition risk secondary to increased nutrient needs related to increased physiologic demand for nutrient as evidenced by pt with acute metabolic encephalopathy, TBI, septic shock 2/2 ESBL bacteremia and decreased po intake.  Pt continues with poor/fair po intake; tolerating puree diet and requires assistance at meals.  RD to remain available.    Recommendations:   1. RX: Ensure Enlive TID   2. RX: Vit C 500mg daily  3. Continue with MVI and folic acid daily  4. Obtain daily weight and monitor trend     Monitoring and Evaluation:   [x ] PO intake [x ] Tolerance to diet prescription [X] Weights  [X] Follow up per protocol [X] Labs:

## 2021-11-08 NOTE — GOALS OF CARE CONVERSATION - ADVANCED CARE PLANNING - CONVERSATION DETAILS
Notified by RN that Pt has a MOLST in chart stating DNR/I but no DNR/I order. MOLST reviewed and although filled out in 2019 stating DNR/I, there is a addendum on third page on 11/5/21 saying Pt rescinded DNR/I and a new MOLST was filled out. New MOLST could not be found. Patient is alert and oriented x3, Notified by RN that Pt has a MOLST in chart stating DNR/I but no DNR/I order. MOLST reviewed and although filled out in 2019 stating DNR/I, there is a addendum on third page on 11/5/21 saying Pt rescinded DNR/I and a new MOLST was filled out. New MOLST could not be found. Patient is alert and oriented x3, answering all questions appropriately. Pt states she wants all interventions including chest compressions and intubation if needed. Pt states that she had discussed this a few days ago with another provider and reiterates the same. RN aware Pt FULL CODE. New MOLST completed 11/8 Notified by RN that Pt has a MOLST in chart stating DNR/I but no DNR/I order. MOLST reviewed and although filled out in 2019 stating DNR/I, there is a addendum on third page on 11/5/21 saying Pt rescinded DNR/I and a new MOLST was filled out. New MOLST could not be found. Patient is alert and oriented x3, answering all questions appropriately. Pt states she wants all interventions including chest compressions and intubation if needed. Pt states that she had discussed this a few days ago with another provider and reiterates the same. RN aware Pt FULL CODE. New MOLST completed 11/8. MD aware.

## 2021-11-08 NOTE — PROGRESS NOTE ADULT - SUBJECTIVE AND OBJECTIVE BOX
Acute Care Surgery/Trauma Surgery Progress Note:    No acute overnight events. Patient afebrile, and hemodynamically competent. . Tolerating pureed diet. No in acute respiratory distress. Patient experiencing hallucinations and refusing physical examination from surgical team in two occasions.     Diet, Pureed (10-31-21 @ 15:00)    Scheduled Medications:   chlorhexidine 4% Liquid 1 Application(s) Topical <User Schedule>  ferrous    sulfate 325 milliGRAM(s) Oral two times a day  folic acid 1 milliGRAM(s) Oral daily  levETIRAcetam 750 milliGRAM(s) Oral two times a day  meropenem  IVPB 1000 milliGRAM(s) IV Intermittent every 8 hours  multivitamin 1 Tablet(s) Oral daily  mupirocin 2% Nasal 1 Application(s) Nasal every 12 hours  pantoprazole   Suspension 40 milliGRAM(s) Oral before breakfast  PARoxetine 30 milliGRAM(s) Oral daily  petrolatum Ophthalmic Ointment 1 Application(s) Both EYES every 6 hours  polyethylene glycol 3350 17 Gram(s) Oral at bedtime  senna 2 Tablet(s) Oral at bedtime    PRN Medications:  oxyCODONE    IR 5 milliGRAM(s) Oral every 6 hours PRN Moderate Pain (4 - 6)  oxyCODONE    IR 10 milliGRAM(s) Oral every 6 hours PRN Severe Pain (7 - 10)    Objective:   T(F): 98.1 (11-08 @ 00:00), Max: 98.4 (11-07 @ 20:00)  HR: 77 (11-08 @ 00:00) (69 - 88)  BP: 135/60 (11-08 @ 00:00) (106/51 - 149/74)  BP(mean): 78 (11-08 @ 00:00) (65 - 92)  ABP: --  ABP(mean): --  RR: 18 (11-08 @ 00:00) (16 - 18)  SpO2: 100% (11-08 @ 00:00) (99% - 100%)      Physical Exam:   GEN: patient resting comfortably in bed, in no acute respiratory distress but active hallucinations.   RESP: respirations are unlabored, no conversational dyspnea  CVS: according to previous physicals RRR.  GI: unable to provide information.  Extremities: symmetric, able to move upper limbs, paraplegia  Neuro: experiencing hallucinations. Refusing several intents of physical examination displaying verbal aggression.    I&O's    11-06 @ 08:01  -  11-07 @ 07:00  --------------------------------------------------------  IN:    IV PiggyBack: 50 mL    Oral Fluid: 540 mL    PRBCs (Packed Red Blood Cells): 316 mL  Total IN: 906 mL    OUT:    Indwelling Catheter - Urethral (mL): 1020 mL    Voided (mL): 400 mL  Total OUT: 1420 mL    Total NET: -514 mL    11-07 @ 07:01  -  11-08 @ 01:47  --------------------------------------------------------  IN:    Oral Fluid: 360 mL  Total IN: 360 mL    OUT:    Indwelling Catheter - Urethral (mL): 210 mL  Total OUT: 210 mL    Total NET: 150 mL    LABS:                        8.0    x     )-----------( x        ( 07 Nov 2021 08:20 )             24.5     11-06    140  |  106  |  18.7  ----------------------------<  87  5.1   |  20.0<L>  |  1.28    Ca    7.6<L>      06 Nov 2021 06:48    TPro  5.9<L>  /  Alb  2.5<L>  /  TBili  0.4  /  DBili  x   /  AST  28  /  ALT  11  /  AlkPhos  125<H>  11-06  PT/INR - ( 07 Nov 2021 08:20 )   PT: 43.3 sec;   INR: 3.99 ratio    PTT - ( 07 Nov 2021 08:20 )  PTT:44.3 sec  MICROBIOLOGY: na    Culture - Blood (collected 11-04 @ 08:13)  Source: .Blood Blood  Preliminary Report (11-06 @ 09:00):    No growth at 48 hours    Culture - Blood (collected 11-04 @ 07:08)  Source: .Blood Blood  Preliminary Report (11-06 @ 09:01):    No growth at 48 hours    PATHOLOGY: na    Assessment:   R thigh hematoma while on coumadin for DVT, due to  Hb6.0 received 2u PRBC.  Normotensive. Patient dropped her H/H11/6 and and got 1U of PRBC, patient likely bled at her hematoma site.    PLAN:   - No intervention from general surgery for thigh hematoma  - q6h h/h and transfuse prbc for hb >7.0. 11/7/ 8 gr/dl.  -Hold coumadin and any AC if risk allows by primary team, can consult IR for IVC filter. Patient has no DVT per US 11/6  - Consider consulting as necessary.
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
## 2021-11-08 NOTE — CHART NOTE - NSCHARTNOTEFT_GEN_A_CORE
RASHAUN FLORES Patient is a 46y old  Female who came to our institution as a Transfer for EEG (08 Nov 2021 01:47)   with  PMHx TBI, functional paraplegia, W/C bound at baseline, C spine fixation, s/p trach/ PEG (now decannulated), seizure d/o, urinary retention w/ indwelling arthur, DVT on Coumadin, ESBL E Coli bacteriemia [place] [facility] [place] and now transferred to [facility] for cvEEG. [place] [facility] [place] was altered, hypotensive, febrile 101 and in DIEUDONNE w/ a supra therapeutic INR ~ 9.5.   SUrgery was consulted due to hematoma and decreasing of hemoglobin she has required 2 PRBC transfusions   todays hemoglobin is 8.7 yesterdays 8   Plan   - No intervention from general surgery for thigh hematoma  - if further decreased of H& H consider IR embolization   -Hold coumadin and any AC if risk allows by primary team, can consult IR for IVC filter. Patient has no DVT per US 11/6  - Consider re consulting as necessary.

## 2021-11-09 LAB
ALBUMIN SERPL ELPH-MCNC: 2.7 G/DL — LOW (ref 3.3–5.2)
ALP SERPL-CCNC: 102 U/L — SIGNIFICANT CHANGE UP (ref 40–120)
ALT FLD-CCNC: 11 U/L — SIGNIFICANT CHANGE UP
ANION GAP SERPL CALC-SCNC: 12 MMOL/L — SIGNIFICANT CHANGE UP (ref 5–17)
ANISOCYTOSIS BLD QL: SLIGHT — SIGNIFICANT CHANGE UP
AST SERPL-CCNC: 13 U/L — SIGNIFICANT CHANGE UP
BASOPHILS # BLD AUTO: 0 K/UL — SIGNIFICANT CHANGE UP (ref 0–0.2)
BASOPHILS NFR BLD AUTO: 0 % — SIGNIFICANT CHANGE UP (ref 0–2)
BILIRUB SERPL-MCNC: 0.7 MG/DL — SIGNIFICANT CHANGE UP (ref 0.4–2)
BUN SERPL-MCNC: 12.5 MG/DL — SIGNIFICANT CHANGE UP (ref 8–20)
BURR CELLS BLD QL SMEAR: PRESENT — SIGNIFICANT CHANGE UP
CALCIUM SERPL-MCNC: 8 MG/DL — LOW (ref 8.6–10.2)
CHLORIDE SERPL-SCNC: 108 MMOL/L — HIGH (ref 98–107)
CO2 SERPL-SCNC: 24 MMOL/L — SIGNIFICANT CHANGE UP (ref 22–29)
CREAT SERPL-MCNC: 0.54 MG/DL — SIGNIFICANT CHANGE UP (ref 0.5–1.3)
CULTURE RESULTS: SIGNIFICANT CHANGE UP
CULTURE RESULTS: SIGNIFICANT CHANGE UP
EOSINOPHIL # BLD AUTO: 0.23 K/UL — SIGNIFICANT CHANGE UP (ref 0–0.5)
EOSINOPHIL NFR BLD AUTO: 0.9 % — SIGNIFICANT CHANGE UP (ref 0–6)
GLUCOSE SERPL-MCNC: 101 MG/DL — HIGH (ref 70–99)
HCT VFR BLD CALC: 25.7 % — LOW (ref 34.5–45)
HGB BLD-MCNC: 8 G/DL — LOW (ref 11.5–15.5)
HYPOCHROMIA BLD QL: SLIGHT — SIGNIFICANT CHANGE UP
INR BLD: 4.13 RATIO — HIGH (ref 0.88–1.16)
LYMPHOCYTES # BLD AUTO: 2.48 K/UL — SIGNIFICANT CHANGE UP (ref 1–3.3)
LYMPHOCYTES # BLD AUTO: 9.6 % — LOW (ref 13–44)
MACROCYTES BLD QL: SLIGHT — SIGNIFICANT CHANGE UP
MANUAL SMEAR VERIFICATION: SIGNIFICANT CHANGE UP
MCHC RBC-ENTMCNC: 27.6 PG — SIGNIFICANT CHANGE UP (ref 27–34)
MCHC RBC-ENTMCNC: 31.1 GM/DL — LOW (ref 32–36)
MCV RBC AUTO: 88.6 FL — SIGNIFICANT CHANGE UP (ref 80–100)
METAMYELOCYTES # FLD: 2.6 % — HIGH (ref 0–0)
MICROCYTES BLD QL: SLIGHT — SIGNIFICANT CHANGE UP
MONOCYTES # BLD AUTO: 1.37 K/UL — HIGH (ref 0–0.9)
MONOCYTES NFR BLD AUTO: 5.3 % — SIGNIFICANT CHANGE UP (ref 2–14)
MYELOCYTES NFR BLD: 0.9 % — HIGH (ref 0–0)
NEUTROPHILS # BLD AUTO: 20.82 K/UL — HIGH (ref 1.8–7.4)
NEUTROPHILS NFR BLD AUTO: 80.7 % — HIGH (ref 43–77)
PLAT MORPH BLD: NORMAL — SIGNIFICANT CHANGE UP
PLATELET # BLD AUTO: 604 K/UL — HIGH (ref 150–400)
POIKILOCYTOSIS BLD QL AUTO: SLIGHT — SIGNIFICANT CHANGE UP
POLYCHROMASIA BLD QL SMEAR: SIGNIFICANT CHANGE UP
POTASSIUM SERPL-MCNC: 4.5 MMOL/L — SIGNIFICANT CHANGE UP (ref 3.5–5.3)
POTASSIUM SERPL-SCNC: 4.5 MMOL/L — SIGNIFICANT CHANGE UP (ref 3.5–5.3)
PROT SERPL-MCNC: 6.8 G/DL — SIGNIFICANT CHANGE UP (ref 6.6–8.7)
PROTHROM AB SERPL-ACNC: 44.8 SEC — HIGH (ref 10.6–13.6)
RBC # BLD: 2.9 M/UL — LOW (ref 3.8–5.2)
RBC # FLD: 16.7 % — HIGH (ref 10.3–14.5)
RBC BLD AUTO: ABNORMAL
SODIUM SERPL-SCNC: 143 MMOL/L — SIGNIFICANT CHANGE UP (ref 135–145)
SPECIMEN SOURCE: SIGNIFICANT CHANGE UP
SPECIMEN SOURCE: SIGNIFICANT CHANGE UP
WBC # BLD: 25.8 K/UL — HIGH (ref 3.8–10.5)
WBC # FLD AUTO: 25.8 K/UL — HIGH (ref 3.8–10.5)

## 2021-11-09 PROCEDURE — 99233 SBSQ HOSP IP/OBS HIGH 50: CPT

## 2021-11-09 RX ADMIN — MEROPENEM 100 MILLIGRAM(S): 1 INJECTION INTRAVENOUS at 21:29

## 2021-11-09 RX ADMIN — Medication 500 MILLIGRAM(S): at 11:38

## 2021-11-09 RX ADMIN — MUPIROCIN 1 APPLICATION(S): 20 OINTMENT TOPICAL at 16:42

## 2021-11-09 RX ADMIN — CHLORHEXIDINE GLUCONATE 1 APPLICATION(S): 213 SOLUTION TOPICAL at 16:40

## 2021-11-09 RX ADMIN — Medication 325 MILLIGRAM(S): at 16:41

## 2021-11-09 RX ADMIN — Medication 1 APPLICATION(S): at 16:41

## 2021-11-09 RX ADMIN — Medication 325 MILLIGRAM(S): at 05:51

## 2021-11-09 RX ADMIN — Medication 1 APPLICATION(S): at 00:39

## 2021-11-09 RX ADMIN — LEVETIRACETAM 750 MILLIGRAM(S): 250 TABLET, FILM COATED ORAL at 16:41

## 2021-11-09 RX ADMIN — MEROPENEM 100 MILLIGRAM(S): 1 INJECTION INTRAVENOUS at 06:03

## 2021-11-09 RX ADMIN — Medication 1 TABLET(S): at 11:38

## 2021-11-09 RX ADMIN — Medication 1 APPLICATION(S): at 11:38

## 2021-11-09 RX ADMIN — MEROPENEM 100 MILLIGRAM(S): 1 INJECTION INTRAVENOUS at 16:40

## 2021-11-09 RX ADMIN — LEVETIRACETAM 750 MILLIGRAM(S): 250 TABLET, FILM COATED ORAL at 05:51

## 2021-11-09 RX ADMIN — PANTOPRAZOLE SODIUM 40 MILLIGRAM(S): 20 TABLET, DELAYED RELEASE ORAL at 06:03

## 2021-11-09 RX ADMIN — Medication 1 APPLICATION(S): at 23:59

## 2021-11-09 RX ADMIN — MEROPENEM 100 MILLIGRAM(S): 1 INJECTION INTRAVENOUS at 00:42

## 2021-11-09 RX ADMIN — Medication 30 MILLIGRAM(S): at 11:38

## 2021-11-09 RX ADMIN — MUPIROCIN 1 APPLICATION(S): 20 OINTMENT TOPICAL at 05:51

## 2021-11-09 RX ADMIN — Medication 1 APPLICATION(S): at 05:51

## 2021-11-09 RX ADMIN — Medication 1 MILLIGRAM(S): at 11:36
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
## 2021-11-09 NOTE — PROGRESS NOTE ADULT - ASSESSMENT
46F w/ PMHx TBI, functional paraplegia, W/C bound at baseline, C spine fixation, s/p trach/ PEG (now decannulated), seizure d/o, urinary retention w/ indwelling arthur, DVT on Coumadin, ESBL E Coli bacteriemia was [place] [facility] [place] and now transferred to [facility] for cvEEG. On presentation to [facility] she was altered, hypotensive, febrile 101 and in DIEUDONNE w/ a supra therapeutic INR ~ 9.5. Code stroke was called and stat CTH was grossly normal. Later a code sepsis was called and pt was resuscitated and given IV Abx. Her neuro status further worsened, and she became unresponsive to noxious stimuli. Not intubated as he was a DNR/DNI. Pt was loaded w/ Keppra and another CTH was requested which was also WNLs. A 3rd CTH was obtained 6hrs later which was also normal.   Over the next 24hrs she was on and off pressors and weaned of VM to RA. INR came down to 2.7 after Vit K. EEG was suggestive of a catastrophic neurological insult w/ B/L GPEDs. BCx were positive for ESBL E Coli   ESBL BACTEREMIA   CONTINUE   MERREM    REPEAT BLOOD CX  NEG  CSF NEG   WOULD PLAN ON TOTAL 2 WEEKS IV ABX FOR ESBL BACTERMIA   WOULD TREAT THROUGH 11/11/21   PT WITH INCREASED WBC UNCLEAR SOURCE PT WITH REPORTED DECUBITUS ULCER GLUTEAL AREA  WOULD   SUGGEST CT OF   AREA R/O DEEPER INFECTION    IF SPIKES FEVER WOULD REPEAT CX AGAIN  PT WITH HALLUCINATIONS WOULD SUGGEST PSYCH EVAL  WILL FOLLOW UP WITH EMILY RECOMMENDATIONS        46F w/ PMHx TBI, functional paraplegia, W/C bound at baseline, C spine fixation, s/p trach/ PEG (now decannulated), seizure d/o, urinary retention w/ indwelling arthur, DVT on Coumadin, ESBL E Coli bacteriemia was [place] [facility] [place] and now transferred to [facility] for cvEEG. On presentation to [facility] she was altered, hypotensive, febrile 101 and in DIEUDONNE w/ a supra therapeutic INR ~ 9.5. Code stroke was called and stat CTH was grossly normal. Later a code sepsis was called and pt was resuscitated and given IV Abx. Her neuro status further worsened, and she became unresponsive to noxious stimuli. Not intubated as he was a DNR/DNI. Pt was loaded w/ Keppra and another CTH was requested which was also WNLs. A 3rd CTH was obtained 6hrs later which was also normal.   Over the next 24hrs she was on and off pressors and weaned of VM to RA. INR came down to 2.7 after Vit K. EEG was suggestive of a catastrophic neurological insult w/ B/L GPEDs. BCx were positive for ESBL E Coli   ESBL BACTEREMIA   CONTINUE   MERREM    REPEAT BLOOD CX  NEG  CSF NEG   WOULD PLAN ON TOTAL 2 WEEKS IV ABX FOR ESBL BACTERMIA   WOULD TREAT THROUGH 11/11/21   PT WITH INCREASED WBC  WITH  LARGE RIGHT HEMATOMA  CT SCAN DONE SHOWED HEAMTOMA   WBC MAY BE REACTIVE TO BLEED AND HEMATOMA   IF WBC INCREASES FURTHER WOULD CONSIDER REPEAT IMAGING OF THIGH    IF SPIKES FEVER WOULD REPEAT CX AGAIN  PT WITH HALLUCINATIONS WOULD SUGGEST PSYCH EVAL  WILL FOLLOW UP WITH EMILY RECOMMENDATIONS
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
## 2021-11-09 NOTE — PROGRESS NOTE ADULT - SUBJECTIVE AND OBJECTIVE BOX
INFECTIOUS DISEASES AND INTERNAL MEDICINE at [place]  =======================================================  Wan Raya MD  Diplomates American Board of Internal Medicine and Infectious Diseases  Telephone [phone]  Fax            [phone]  =======================================================    RASHAUN FLORES [id number]    Follow up: AMS  BACTEREMIA ESBL    Allergies:  No Known Allergies      Medications:  chlorhexidine 4% Liquid 1 Application(s) Topical <User Schedule>  heparin   Injectable 8000 Unit(s) IV Push every 6 hours PRN  heparin   Injectable 4000 Unit(s) IV Push every 6 hours PRN  heparin  Infusion.  Unit(s)/Hr IV Continuous <Continuous>  lactated ringers. 1000 milliLiter(s) IV Continuous <Continuous>  levETIRAcetam  IVPB 1000 milliGRAM(s) IV Intermittent every 12 hours  meropenem  IVPB 1000 milliGRAM(s) IV Intermittent every 12 hours  pantoprazole  Injectable 40 milliGRAM(s) IV Push daily  petrolatum Ophthalmic Ointment 1 Application(s) Both EYES every 6 hours  potassium chloride  20 mEq/100 mL IVPB 20 milliEquivalent(s) IV Intermittent every 2 hours  valproate sodium IVPB 500 milliGRAM(s) IV Intermittent every 12 hours    SOCIAL       FAMILY   FAMILY HISTORY:  FH: stroke  dad    FH: diabetes mellitus  mom    Family history of hepatitis C  mom      REVIEW OF SYSTEMS:  CONSTITUTIONAL:  No Fever or chills  HEENT:   No diplopia or blurred vision.  No earache, sore throat or runny nose.  CARDIOVASCULAR:  No pressure, squeezing, strangling, tightness, heaviness or aching about the chest, neck, axilla or epigastrium.  RESPIRATORY:  No cough, shortness of breath, PND or orthopnea.  GASTROINTESTINAL:  No nausea, vomiting or diarrhea.  GENITOURINARY:  No dysuria, frequency or urgency. No Blood in urine  MUSCULOSKELETAL:   moves all joints  SKIN:  No change in skin, hair or nails.  NEUROLOGIC:  MORE AWAKE ANSWERS   QUESTIONS         Physical Exam:  I   Vital Signs Last 24 Hrs  T(C): 36.8 (09 Nov 2021 04:00), Max: 37.1 (08 Nov 2021 20:00)  T(F): 98.2 (09 Nov 2021 04:00), Max: 98.7 (08 Nov 2021 20:00)  HR: 107 (09 Nov 2021 06:00) (92 - 110)  BP: 127/74 (09 Nov 2021 06:00) (125/62 - 140/64)  BP(mean): 84 (09 Nov 2021 06:00) (75 - 95)  RR: 18 (09 Nov 2021 06:00) (16 - 18)  SpO2: 100% (09 Nov 2021 06:00) (100% - 100%)    GEN: NAD,  AWAKE  ANSWERS  QUESTIONS   HEENT: normocephalic and atraumatic. EOMI. RAYNE.    NECK: Supple. No carotid bruits.  No lymphadenopathy or thyromegaly.  LUNGS: Clear to auscultation.  HEART: Regular rate and rhythm without murmur.  ABDOMEN: Soft, nontender, and nondistended.  Positive bowel sounds.    : No CVA tenderness  EXTREMITIES: Without any cyanosis, clubbing, rash, lesions or edema.  MSK: no joint swelling  NEUROLOGIC:  PAPRAPLEGIA        Labs:  Vitals:  ===     =======================================================  Current Antibiotics:  meropenem  IVPB 1000 milliGRAM(s) IV Intermittent every 12 hours    Other medications:  chlorhexidine 4% Liquid 1 Application(s) Topical <User Schedule>  heparin  Infusion.  Unit(s)/Hr IV Continuous <Continuous>  lactated ringers. 1000 milliLiter(s) IV Continuous <Continuous>  levETIRAcetam  IVPB 1000 milliGRAM(s) IV Intermittent every 12 hours  pantoprazole  Injectable 40 milliGRAM(s) IV Push daily  petrolatum Ophthalmic Ointment 1 Application(s) Both EYES every 6 hours  potassium chloride  20 mEq/100 mL IVPB 20 milliEquivalent(s) IV Intermittent every 2 hours  valproate sodium IVPB 500 milliGRAM(s) IV Intermittent every 12 hours      =======================================================  Labs:                                                           8.0    25.80 )-----------( 604      ( 09 Nov 2021 06:28 )  11-09    143  |  108<H>  |  12.5  ----------------------------<  101<H>  4.5   |  24.0  |  0.54    Ca    8.0<L>      09 Nov 2021 06:28    TPro  6.8  /  Alb  2.7<L>  /  TBili  0.7  /  DBili  x   /  AST  13  /  ALT  11  /  AlkPhos  102  11-09               25.7           WBC Count: 18.57 K/uL (10-31-21 @ 04:17)  WBC Count: 20.96 K/uL (10-30-21 @ 07:08)          Alkaline Phosphatase, Serum: 110 U/L (10-31-21 @ 04:17)  Alkaline Phosphatase, Serum: 118 U/L (10-30-21 @ 07:08)  Alanine Aminotransferase (ALT/SGPT): 18 U/L (10-31-21 @ 04:17)  Alanine Aminotransferase (ALT/SGPT): 22 U/L (10-30-21 @ 07:08)  Aspartate Aminotransferase (AST/SGOT): 18 U/L (10-31-21 @ 04:17)  Aspartate Aminotransferase (AST/SGOT): 31 U/L (10-30-21 @ 07:08)  Bilirubin Total, Serum: 0.3 mg/dL (10-31-21 @ 04:17)  Bilirubin Total, Serum: 0.3 mg/dL (10-30-21 @ 07:08)

## 2021-11-10 LAB
ALBUMIN SERPL ELPH-MCNC: 2.9 G/DL — LOW (ref 3.3–5.2)
ALP SERPL-CCNC: 105 U/L — SIGNIFICANT CHANGE UP (ref 40–120)
ALT FLD-CCNC: 13 U/L — SIGNIFICANT CHANGE UP
ANION GAP SERPL CALC-SCNC: 13 MMOL/L — SIGNIFICANT CHANGE UP (ref 5–17)
AST SERPL-CCNC: 15 U/L — SIGNIFICANT CHANGE UP
BASOPHILS # BLD AUTO: 0.07 K/UL — SIGNIFICANT CHANGE UP (ref 0–0.2)
BASOPHILS NFR BLD AUTO: 0.4 % — SIGNIFICANT CHANGE UP (ref 0–2)
BILIRUB SERPL-MCNC: 1 MG/DL — SIGNIFICANT CHANGE UP (ref 0.4–2)
BUN SERPL-MCNC: 11.8 MG/DL — SIGNIFICANT CHANGE UP (ref 8–20)
CALCIUM SERPL-MCNC: 8.2 MG/DL — LOW (ref 8.6–10.2)
CHLORIDE SERPL-SCNC: 108 MMOL/L — HIGH (ref 98–107)
CO2 SERPL-SCNC: 24 MMOL/L — SIGNIFICANT CHANGE UP (ref 22–29)
CREAT SERPL-MCNC: 0.53 MG/DL — SIGNIFICANT CHANGE UP (ref 0.5–1.3)
EOSINOPHIL # BLD AUTO: 0.3 K/UL — SIGNIFICANT CHANGE UP (ref 0–0.5)
EOSINOPHIL NFR BLD AUTO: 1.8 % — SIGNIFICANT CHANGE UP (ref 0–6)
GLUCOSE SERPL-MCNC: 90 MG/DL — SIGNIFICANT CHANGE UP (ref 70–99)
HCT VFR BLD CALC: 23.9 % — LOW (ref 34.5–45)
HGB BLD-MCNC: 7.4 G/DL — LOW (ref 11.5–15.5)
IMM GRANULOCYTES NFR BLD AUTO: 7 % — HIGH (ref 0–1.5)
INR BLD: 3.04 RATIO — HIGH (ref 0.88–1.16)
LYMPHOCYTES # BLD AUTO: 14.6 % — SIGNIFICANT CHANGE UP (ref 13–44)
LYMPHOCYTES # BLD AUTO: 2.42 K/UL — SIGNIFICANT CHANGE UP (ref 1–3.3)
MCHC RBC-ENTMCNC: 28.2 PG — SIGNIFICANT CHANGE UP (ref 27–34)
MCHC RBC-ENTMCNC: 31 GM/DL — LOW (ref 32–36)
MCV RBC AUTO: 91.2 FL — SIGNIFICANT CHANGE UP (ref 80–100)
MONOCYTES # BLD AUTO: 1.31 K/UL — HIGH (ref 0–0.9)
MONOCYTES NFR BLD AUTO: 7.9 % — SIGNIFICANT CHANGE UP (ref 2–14)
NEUTROPHILS # BLD AUTO: 11.36 K/UL — HIGH (ref 1.8–7.4)
NEUTROPHILS NFR BLD AUTO: 68.3 % — SIGNIFICANT CHANGE UP (ref 43–77)
PLATELET # BLD AUTO: 581 K/UL — HIGH (ref 150–400)
POTASSIUM SERPL-MCNC: 4.1 MMOL/L — SIGNIFICANT CHANGE UP (ref 3.5–5.3)
POTASSIUM SERPL-SCNC: 4.1 MMOL/L — SIGNIFICANT CHANGE UP (ref 3.5–5.3)
PROT SERPL-MCNC: 7.4 G/DL — SIGNIFICANT CHANGE UP (ref 6.6–8.7)
PROTHROM AB SERPL-ACNC: 33.4 SEC — HIGH (ref 10.6–13.6)
RBC # BLD: 2.62 M/UL — LOW (ref 3.8–5.2)
RBC # FLD: 17.5 % — HIGH (ref 10.3–14.5)
SODIUM SERPL-SCNC: 145 MMOL/L — SIGNIFICANT CHANGE UP (ref 135–145)
WBC # BLD: 16.62 K/UL — HIGH (ref 3.8–10.5)
WBC # FLD AUTO: 16.62 K/UL — HIGH (ref 3.8–10.5)

## 2021-11-10 PROCEDURE — 99233 SBSQ HOSP IP/OBS HIGH 50: CPT

## 2021-11-10 PROCEDURE — 99232 SBSQ HOSP IP/OBS MODERATE 35: CPT

## 2021-11-10 RX ADMIN — Medication 500 MILLIGRAM(S): at 12:17

## 2021-11-10 RX ADMIN — Medication 30 MILLIGRAM(S): at 12:17

## 2021-11-10 RX ADMIN — LEVETIRACETAM 750 MILLIGRAM(S): 250 TABLET, FILM COATED ORAL at 17:55

## 2021-11-10 RX ADMIN — Medication 1 TABLET(S): at 12:17

## 2021-11-10 RX ADMIN — POLYETHYLENE GLYCOL 3350 17 GRAM(S): 17 POWDER, FOR SOLUTION ORAL at 21:33

## 2021-11-10 RX ADMIN — MEROPENEM 100 MILLIGRAM(S): 1 INJECTION INTRAVENOUS at 14:56

## 2021-11-10 RX ADMIN — CHLORHEXIDINE GLUCONATE 1 APPLICATION(S): 213 SOLUTION TOPICAL at 05:13

## 2021-11-10 RX ADMIN — Medication 1 APPLICATION(S): at 05:12

## 2021-11-10 RX ADMIN — MEROPENEM 100 MILLIGRAM(S): 1 INJECTION INTRAVENOUS at 21:34

## 2021-11-10 RX ADMIN — Medication 1 MILLIGRAM(S): at 12:17

## 2021-11-10 RX ADMIN — Medication 325 MILLIGRAM(S): at 05:12

## 2021-11-10 RX ADMIN — MUPIROCIN 1 APPLICATION(S): 20 OINTMENT TOPICAL at 17:54

## 2021-11-10 RX ADMIN — MUPIROCIN 1 APPLICATION(S): 20 OINTMENT TOPICAL at 05:12

## 2021-11-10 RX ADMIN — PANTOPRAZOLE SODIUM 40 MILLIGRAM(S): 20 TABLET, DELAYED RELEASE ORAL at 05:12

## 2021-11-10 RX ADMIN — MEROPENEM 100 MILLIGRAM(S): 1 INJECTION INTRAVENOUS at 05:11

## 2021-11-10 RX ADMIN — SENNA PLUS 2 TABLET(S): 8.6 TABLET ORAL at 21:34

## 2021-11-10 RX ADMIN — Medication 1 APPLICATION(S): at 18:00

## 2021-11-10 RX ADMIN — LEVETIRACETAM 750 MILLIGRAM(S): 250 TABLET, FILM COATED ORAL at 05:12

## 2021-11-10 RX ADMIN — Medication 325 MILLIGRAM(S): at 17:55

## 2021-11-10 RX ADMIN — Medication 1 APPLICATION(S): at 12:17
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
## 2021-11-10 NOTE — PROGRESS NOTE ADULT - SUBJECTIVE AND OBJECTIVE BOX
INFECTIOUS DISEASES AND INTERNAL MEDICINE at [place]  =======================================================  Wan Raya MD  Diplomates American Board of Internal Medicine and Infectious Diseases  Telephone [phone]  Fax            [phone]  =======================================================    RASHAUN FLORES [id number]    Follow up: AMS  BACTEREMIA ESBL    Allergies:  No Known Allergies      Medications:  chlorhexidine 4% Liquid 1 Application(s) Topical <User Schedule>  heparin   Injectable 8000 Unit(s) IV Push every 6 hours PRN  heparin   Injectable 4000 Unit(s) IV Push every 6 hours PRN  heparin  Infusion.  Unit(s)/Hr IV Continuous <Continuous>  lactated ringers. 1000 milliLiter(s) IV Continuous <Continuous>  levETIRAcetam  IVPB 1000 milliGRAM(s) IV Intermittent every 12 hours  meropenem  IVPB 1000 milliGRAM(s) IV Intermittent every 12 hours  pantoprazole  Injectable 40 milliGRAM(s) IV Push daily  petrolatum Ophthalmic Ointment 1 Application(s) Both EYES every 6 hours  potassium chloride  20 mEq/100 mL IVPB 20 milliEquivalent(s) IV Intermittent every 2 hours  valproate sodium IVPB 500 milliGRAM(s) IV Intermittent every 12 hours    SOCIAL       FAMILY   FAMILY HISTORY:  FH: stroke  dad    FH: diabetes mellitus  mom    Family history of hepatitis C  mom      REVIEW OF SYSTEMS:  CONSTITUTIONAL:  No Fever or chills  HEENT:   No diplopia or blurred vision.  No earache, sore throat or runny nose.  CARDIOVASCULAR:  No pressure, squeezing, strangling, tightness, heaviness or aching about the chest, neck, axilla or epigastrium.  RESPIRATORY:  No cough, shortness of breath, PND or orthopnea.  GASTROINTESTINAL:  No nausea, vomiting or diarrhea.  GENITOURINARY:  No dysuria, frequency or urgency. No Blood in urine  MUSCULOSKELETAL:   moves all joints  SKIN:  No change in skin, hair or nails.  NEUROLOGIC:  MORE AWAKE ANSWERS   QUESTIONS         Physical Exam:  I    Vital Signs Last 24 Hrs  T(C): 36.8 (10 Nov 2021 08:00), Max: 37 (09 Nov 2021 15:50)  T(F): 98.2 (10 Nov 2021 08:00), Max: 98.6 (09 Nov 2021 15:50)  HR: 86 (10 Nov 2021 08:00) (82 - 102)  BP: 108/70 (10 Nov 2021 08:00) (96/51 - 126/71)  BP(mean): 62 (10 Nov 2021 04:00) (62 - 91)  RR: 18 (10 Nov 2021 08:00) (15 - 18)  SpO2: 100% (10 Nov 2021 08:00) (98% - 100%)    GEN: NAD,  AWAKE  ANSWERS  QUESTIONS   HEENT: normocephalic and atraumatic. EOMI. RAYNE.    NECK: Supple. No carotid bruits.  No lymphadenopathy or thyromegaly.  LUNGS: Clear to auscultation.  HEART: Regular rate and rhythm without murmur.  ABDOMEN: Soft, nontender, and nondistended.  Positive bowel sounds.    : No CVA tenderness  EXTREMITIES: Without any cyanosis, clubbing, rash, lesions or edema.  MSK: no joint swelling  NEUROLOGIC:  PAPRAPLEGIA        Labs:  Vitals:  ===     =======================================================  Current Antibiotics:  meropenem  IVPB 1000 milliGRAM(s) IV Intermittent every 12 hours    Other medications:  chlorhexidine 4% Liquid 1 Application(s) Topical <User Schedule>  heparin  Infusion.  Unit(s)/Hr IV Continuous <Continuous>  lactated ringers. 1000 milliLiter(s) IV Continuous <Continuous>  levETIRAcetam  IVPB 1000 milliGRAM(s) IV Intermittent every 12 hours  pantoprazole  Injectable 40 milliGRAM(s) IV Push daily  petrolatum Ophthalmic Ointment 1 Application(s) Both EYES every 6 hours  potassium chloride  20 mEq/100 mL IVPB 20 milliEquivalent(s) IV Intermittent every 2 hours  valproate sodium IVPB 500 milliGRAM(s) IV Intermittent every 12 hours      =======================================================  Labs:                                                            7.4    16.62 )-----------( 581      ( 10 Nov 2021 07:10 )             23.9     11-10    145  |  108<H>  |  11.8  ----------------------------<  90  4.1   |  24.0  |  0.53    Ca    8.2<L>      10 Nov 2021 07:10    TPro  7.4  /  Alb  2.9<L>  /  TBili  1.0  /  DBili  x   /  AST  15  /  ALT  13  /  AlkPhos  105  11-10            WBC Count: 18.57 K/uL (10-31-21 @ 04:17)  WBC Count: 20.96 K/uL (10-30-21 @ 07:08)          Alkaline Phosphatase, Serum: 110 U/L (10-31-21 @ 04:17)  Alkaline Phosphatase, Serum: 118 U/L (10-30-21 @ 07:08)  Alanine Aminotransferase (ALT/SGPT): 18 U/L (10-31-21 @ 04:17)  Alanine Aminotransferase (ALT/SGPT): 22 U/L (10-30-21 @ 07:08)  Aspartate Aminotransferase (AST/SGOT): 18 U/L (10-31-21 @ 04:17)  Aspartate Aminotransferase (AST/SGOT): 31 U/L (10-30-21 @ 07:08)  Bilirubin Total, Serum: 0.3 mg/dL (10-31-21 @ 04:17)  Bilirubin Total, Serum: 0.3 mg/dL (10-30-21 @ 07:08)
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
## 2021-11-10 NOTE — PROGRESS NOTE ADULT - SUBJECTIVE AND OBJECTIVE BOX
OVERNIGHT EVENTS: + thigh hematoma - hemoglobin stable     Present Symptoms:     Dyspnea: none   Nausea/Vomiting: No  Anxiety:  No  Depression: No  Fatigue: Yes   Loss of appetite: No  Constipation: none     Pain: none             Character-            Duration-            Effect-            Factors-            Frequency-            Location-            Severity-    Pain AD Score:  [email or web address] (press ctrl + left click to view)    Review of Systems: Reviewed                     Negative: no chest pain                  All others negative    MEDICATIONS  (STANDING):  ascorbic acid 500 milliGRAM(s) Oral daily  chlorhexidine 4% Liquid 1 Application(s) Topical <User Schedule>  ferrous    sulfate 325 milliGRAM(s) Oral two times a day  folic acid 1 milliGRAM(s) Oral daily  levETIRAcetam 750 milliGRAM(s) Oral two times a day  meropenem  IVPB 1000 milliGRAM(s) IV Intermittent every 8 hours  multivitamin 1 Tablet(s) Oral daily  mupirocin 2% Nasal 1 Application(s) Nasal every 12 hours  pantoprazole   Suspension 40 milliGRAM(s) Oral before breakfast  PARoxetine 30 milliGRAM(s) Oral daily  petrolatum Ophthalmic Ointment 1 Application(s) Both EYES every 6 hours  polyethylene glycol 3350 17 Gram(s) Oral at bedtime  senna 2 Tablet(s) Oral at bedtime    MEDICATIONS  (PRN):    PHYSICAL EXAM:    Vital Signs Last 24 Hrs  T(C): 36.8 (10 Nov 2021 08:00), Max: 37 (09 Nov 2021 15:50)  T(F): 98.2 (10 Nov 2021 08:00), Max: 98.6 (09 Nov 2021 15:50)  HR: 99 (10 Nov 2021 12:00) (82 - 102)  BP: 111/52 (10 Nov 2021 12:00) (96/51 - 120/83)  BP(mean): 62 (10 Nov 2021 04:00) (62 - 91)  RR: 18 (10 Nov 2021 12:00) (15 - 18)  SpO2: 100% (10 Nov 2021 12:00) (98% - 100%)    General: alert  in no acute distress     Karnofsky:  30 %    HEENT: normal. old trach scar on neck     Lungs: comfortable     CV: normal      GI: normal      : arthur    MSK: bedbound/wheelchair bound    Skin: no rash    LABS:                      7.4    16.62 )-----------( 581      ( 10 Nov 2021 07:10 )             23.9     11-10    145  |  108<H>  |  11.8  ----------------------------<  90  4.1   |  24.0  |  0.53    Ca    8.2<L>      10 Nov 2021 07:10    TPro  7.4  /  Alb  2.9<L>  /  TBili  1.0  /  DBili  x   /  AST  15  /  ALT  13  /  AlkPhos  105  11-10    PT/INR - ( 10 Nov 2021 07:10 )   PT: 33.4 sec;   INR: 3.04 ratio      I&O's Summary    09 Nov 2021 07:01  -  10 Nov 2021 07:00  --------------------------------------------------------  IN: 360 mL / OUT: 1950 mL / NET: -1590 mL    10 Nov 2021 07:01  -  10 Nov 2021 12:50  --------------------------------------------------------  IN: 120 mL / OUT: 350 mL / NET: -230 mL    RADIOLOGY & ADDITIONAL STUDIES:    ADVANCE DIRECTIVES/TREATMENT PREFERENCES:  now full code

## 2021-11-10 NOTE — PROGRESS NOTE ADULT - ASSESSMENT
46F w/ PMHx TBI, functional paraplegia, W/C bound at baseline, C spine fixation, s/p trach/ PEG (now decannulated), seizure d/o, urinary retention w/ indwelling arthur, DVT on Coumadin, ESBL E Coli bacteriemia was sent over from NH to [facility] w/ AMS and now transferred to [facility] for cvEEG. On presentation to [facility] she was altered, hypotensive, febrile 101 and in DIEUDONNE w/ a supra therapeutic INR ~ 9.5. Code stroke was called and stat CTH was grossly normal. Later a code sepsis was called and pt was resuscitated and given IV Abx. Her neuro status further worsened, and she became unresponsive to noxious stimuli. Not intubated as he was a DNR/DNI. Pt was loaded w/ Keppra and another CTH was requested which was also WNLs. A 3rd CTH was obtained 6hrs later which was also normal.   Over the next 24hrs she was on and off pressors and weaned of VM to RA. INR came down to 2.7 after Vit K. EEG was suggestive of a catastrophic neurological insult w/ B/L GPEDs. BCx were positive for ESBL E Coli   ESBL BACTEREMIA   CONTINUE   MERREM    REPEAT BLOOD CX  NEG  CSF NEG   WOULD PLAN ON TOTAL 2 WEEKS IV ABX FOR ESBL BACTERMIA   WOULD TREAT THROUGH 11/11/21   PT WITH INCREASED WBC  WITH  LARGE RIGHT HEMATOMA   WBC DOWN TODAY  CT SCAN DONE SHOWED HEAMTOMA   WBC MAY BE REACTIVE TO BLEED AND HEMATOMA   IF WBC INCREASES FURTHER WOULD CONSIDER REPEAT IMAGING OF THIGH    IF SPIKES FEVER WOULD REPEAT CX AGAIN  PT WITH HALLUCINATIONS WOULD SUGGEST PSYCH EVAL  WILL FOLLOW UP WITH EMILY RECOMMENDATIONS

## 2021-11-10 NOTE — CHART NOTE - NSCHARTNOTEFT_GEN_A_CORE
Palliative care social work note.    SW met with patient to provide supportive counseling. Patient pleasant but dismissive at times minimizing need to further discuss issues. SW attempted to engage in addressing her 9 year old daughter. patient reports that daughter is being cared for by her girlfriend Lillian and everything is fine. SW explored involvement with oldest son who is 30 years old. patient continued to say "everything is fine" and he is around for extra help.

## 2021-11-10 NOTE — PROGRESS NOTE ADULT - SUBJECTIVE AND OBJECTIVE BOX
RASHAUN FLORES Patient is a 46y old  Female who presents with a chief complaint of Transfer for EEG (10 Nov 2021 09:56)     HPI:  46F w/ PMHx TBI, functional paraplegia, W/C bound at baseline, C spine fixation, s/p trach/ PEG (now decannulated), seizure d/o, urinary retention w/ indwelling arthur, DVT on Coumadin, ESBL E Coli bacteriemia was sent over from NH to [facility] [place] w/ AMS and now transferred to [facility] for cvEEG. On presentation to [facility] she was altered, hypotensive, febrile 101 and in DIEUDONNE w/ a supra therapeutic INR ~ 9.5. Code stroke was called and stat CTH was grossly normal. Later a code sepsis was called and pt was resuscitated and given IV Abx. Her neuro status further worsened, and she became unresponsive to noxious stimuli. Not intubated as he was a DNR/DNI. Pt was loaded w/ Keppra and another CTH was requested which was also WNLs. A 3rd CTH was obtained 6hrs later which was also normal.   Over the next 24hrs she was on and off pressors and weaned of VM to RA. INR came down to 2.7 after Vit K. EEG was suggestive of a catastrophic neurological insult w/ B/L GPEDs. BCx were positive for ESBL E Coli and Meropenem was switched to Ayvcaz as per ID recs. Pt was loaded w/ VA 2g prior to transfer to [facility]  (30 Oct 2021 05:02)    The patient was seen and evaluated   The patient is in no acute distress.      I&O's Summary    09 Nov 2021 07:01  -  10 Nov 2021 07:00  --------------------------------------------------------  IN: 360 mL / OUT: 1950 mL / NET: -1590 mL      Allergies    No Known Allergies    Intolerances      HEALTH ISSUES - PROBLEM Dx:        PAST MEDICAL & SURGICAL HISTORY:  TBI (traumatic brain injury)    History of paraplegia    DVT, lower extremity    Chronic neck pain with history of cervical spinal surgery    History of Lorenza-en-Y gastric bypass            Vital Signs Last 24 Hrs  T(C): 36.8 (10 Nov 2021 08:00), Max: 37 (09 Nov 2021 15:50)  T(F): 98.2 (10 Nov 2021 08:00), Max: 98.6 (09 Nov 2021 15:50)  HR: 86 (10 Nov 2021 08:00) (82 - 102)  BP: 108/70 (10 Nov 2021 08:00) (96/51 - 126/71)  BP(mean): 62 (10 Nov 2021 04:00) (62 - 91)  RR: 18 (10 Nov 2021 08:00) (15 - 18)  SpO2: 100% (10 Nov 2021 08:00) (98% - 100%)T(C): 36.8 (11-10-21 @ 08:00), Max: 37 (11-09-21 @ 15:50)  HR: 86 (11-10-21 @ 08:00) (82 - 102)  BP: 108/70 (11-10-21 @ 08:00) (96/51 - 126/71)  RR: 18 (11-10-21 @ 08:00) (15 - 18)  SpO2: 100% (11-10-21 @ 08:00) (98% - 100%)  Wt(kg): --    PHYSICAL EXAM:  HEAD:  some what confused  NERVOUS SYSTEM:  Alert & Moves upper and paraplegic  lower extremities; CNS-II-XII  CHEST/LUNG: Clear to auscultation bilaterally; No rales, rhonchi, wheezing,   HEART: Regular rate and rhythm; No murmurs,   ABDOMEN: Soft, Nontender, Nondistended; Bowel sounds present  EXTREMITIES:  Peripheral Pulses, No  cyanosis, or edema  psychiatry- cant asses Insight and judgement intact          ascorbic acid 500 milliGRAM(s) Oral daily  chlorhexidine 4% Liquid 1 Application(s) Topical <User Schedule>  ferrous    sulfate 325 milliGRAM(s) Oral two times a day  folic acid 1 milliGRAM(s) Oral daily  levETIRAcetam 750 milliGRAM(s) Oral two times a day  meropenem  IVPB 1000 milliGRAM(s) IV Intermittent every 8 hours  multivitamin 1 Tablet(s) Oral daily  mupirocin 2% Nasal 1 Application(s) Nasal every 12 hours  pantoprazole   Suspension 40 milliGRAM(s) Oral before breakfast  PARoxetine 30 milliGRAM(s) Oral daily  petrolatum Ophthalmic Ointment 1 Application(s) Both EYES every 6 hours  polyethylene glycol 3350 17 Gram(s) Oral at bedtime  senna 2 Tablet(s) Oral at bedtime      LABS:                          7.4    16.62 )-----------( 581      ( 10 Nov 2021 07:10 )             23.9     11-10    145  |  108<H>  |  11.8  ----------------------------<  90  4.1   |  24.0  |  0.53    Ca    8.2<L>      10 Nov 2021 07:10    TPro  7.4  /  Alb  2.9<L>  /  TBili  1.0  /  DBili  x   /  AST  15  /  ALT  13  /  AlkPhos  105  11-10    LIVER FUNCTIONS - ( 10 Nov 2021 07:10 )  Alb: 2.9 g/dL / Pro: 7.4 g/dL / ALK PHOS: 105 U/L / ALT: 13 U/L / AST: 15 U/L / GGT: x           PT/INR - ( 10 Nov 2021 07:10 )   PT: 33.4 sec;   INR: 3.04 ratio                 CAPILLARY BLOOD GLUCOSE          RADIOLOGY & ADDITIONAL TESTS:      Consultant notes reviewed    Case discussed with consultant/provider/ family /patient

## 2021-11-11 LAB — BLD GP AB SCN SERPL QL: SIGNIFICANT CHANGE UP

## 2021-11-11 PROCEDURE — 36569 INSJ PICC 5 YR+ W/O IMAGING: CPT

## 2021-11-11 PROCEDURE — 99233 SBSQ HOSP IP/OBS HIGH 50: CPT

## 2021-11-11 RX ADMIN — Medication 1 MILLIGRAM(S): at 12:23

## 2021-11-11 RX ADMIN — SENNA PLUS 2 TABLET(S): 8.6 TABLET ORAL at 20:47

## 2021-11-11 RX ADMIN — Medication 500 MILLIGRAM(S): at 12:23

## 2021-11-11 RX ADMIN — Medication 1 TABLET(S): at 12:23

## 2021-11-11 RX ADMIN — MUPIROCIN 1 APPLICATION(S): 20 OINTMENT TOPICAL at 17:17

## 2021-11-11 RX ADMIN — Medication 30 MILLIGRAM(S): at 12:23

## 2021-11-11 RX ADMIN — MUPIROCIN 1 APPLICATION(S): 20 OINTMENT TOPICAL at 05:51

## 2021-11-11 RX ADMIN — Medication 1 APPLICATION(S): at 06:14

## 2021-11-11 RX ADMIN — CHLORHEXIDINE GLUCONATE 1 APPLICATION(S): 213 SOLUTION TOPICAL at 06:14

## 2021-11-11 RX ADMIN — MEROPENEM 100 MILLIGRAM(S): 1 INJECTION INTRAVENOUS at 12:27

## 2021-11-11 RX ADMIN — PANTOPRAZOLE SODIUM 40 MILLIGRAM(S): 20 TABLET, DELAYED RELEASE ORAL at 06:14

## 2021-11-11 RX ADMIN — Medication 1 APPLICATION(S): at 17:15

## 2021-11-11 RX ADMIN — Medication 325 MILLIGRAM(S): at 17:17

## 2021-11-11 RX ADMIN — LEVETIRACETAM 750 MILLIGRAM(S): 250 TABLET, FILM COATED ORAL at 20:47

## 2021-11-11 RX ADMIN — Medication 325 MILLIGRAM(S): at 05:50

## 2021-11-11 RX ADMIN — MEROPENEM 100 MILLIGRAM(S): 1 INJECTION INTRAVENOUS at 05:50

## 2021-11-11 RX ADMIN — Medication 1 APPLICATION(S): at 02:55

## 2021-11-11 RX ADMIN — LEVETIRACETAM 750 MILLIGRAM(S): 250 TABLET, FILM COATED ORAL at 05:50

## 2021-11-11 RX ADMIN — Medication 1 APPLICATION(S): at 12:27
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
## 2021-11-11 NOTE — PROCEDURE NOTE - NSPROCDETAILS_GEN_ALL_CORE
location identified, draped/prepped, sterile technique used/blood seen on insertion/dressing applied/flushes easily/secured in place/sterile technique, catheter placed
location identified, draped/prepped, sterile technique used/sterile dressing applied/sterile technique, catheter placed/supine position/ultrasound guidance
location identified, draped/prepped, sterile technique used/blood seen on insertion/dressing applied/flushes easily/secured in place/sterile technique, catheter placed

## 2021-11-11 NOTE — ADVANCED PRACTICE NURSE CONSULT - ASSESSMENT
Patient A&Ox4 , requires 2 p assist with turning.  Current Gilberto Score of 12. Wound and Primary RN present at bedside for site assessment.  Patient with noted episode of urinary incontinence at time of assessment.  Significant perineal erythema to buttock IAD/MAD related.      ·	Left Ischial Tuberosity Stage 2 PI (1cm x1cm x 0.5cm) - white moist wound bed scant serous drainage within deep crease/cleft, periwound with IAD/ hematoma related redness, moist, erythema, intact    ·	Right Ischial Tuberosity Stage 2 PI (6cm x3cm x 0.3cm) - white moist wound bed scant serous drainage within deep crease/cleft, periwound induration, redness, moist, erythema, intact

## 2021-11-11 NOTE — PROGRESS NOTE ADULT - SUBJECTIVE AND OBJECTIVE BOX
CC: TBI with paraplegia  Right thigh hematoma  Anemia   HPI:  46F w/ PMHx TBI, functional paraplegia, W/C bound at baseline, C spine fixation, s/p trach/ PEG (now decannulated), seizure d/o, urinary retention w/ indwelling arthur, DVT on Coumadin, ESBL E Coli bacteriemia [place] [facility] [place] and now transferred to [facility] for cvEEG. [place] [facility] was altered, hypotensive, febrile 101 and in DIEUDONNE w/ a supra therapeutic INR ~ 9.5. Code stroke was called and stat CTH was grossly normal. Later a code sepsis was called and pt was resuscitated and given IV Abx. Her neuro status further worsened, and she became unresponsive to noxious stimuli. Not intubated as he was a DNR/DNI. Pt was loaded w/ Keppra and another CTH was requested which was also WNLs. A 3rd CTH was obtained 6hrs later which was also normal.   Over the next 24hrs she was on and off pressors and weaned of VM to RA. INR came down to 2.7 after Vit K. EEG was suggestive of a catastrophic neurological insult w/ B/L GPEDs. BCx were positive for ESBL E Coli and Meropenem was switched to Ayvcaz as per ID recs. Pt was loaded w/ VA 2g prior to transfer to [facility]  (30 Oct 2021 05:02)    REVIEW OF SYSTEMS:    Patient denied fever, chills, abdominal pain, nausea, vomiting, cough, shortness of breath, chest pain or palpitations    Vital Signs Last 24 Hrs  T(C): 36.9 (11 Nov 2021 08:00), Max: 36.9 (11 Nov 2021 08:00)  T(F): 98.4 (11 Nov 2021 08:00), Max: 98.4 (11 Nov 2021 08:00)  HR: 92 (11 Nov 2021 12:00) (91 - 111)  BP: 108/68 (11 Nov 2021 12:00) (108/68 - 143/71)  BP(mean): 87 (11 Nov 2021 06:00) (66 - 87)  RR: 20 (11 Nov 2021 12:00) (16 - 20)  SpO2: 99% (11 Nov 2021 12:00) (96% - 100%)I&O's Summary    10 Nov 2021 07:01  -  11 Nov 2021 07:00  --------------------------------------------------------  IN: 120 mL / OUT: 1525 mL / NET: -1405 mL    11 Nov 2021 07:01  -  11 Nov 2021 14:00  --------------------------------------------------------  IN: 360 mL / OUT: 350 mL / NET: 10 mL      PHYSICAL EXAM:  GENERAL: NAD,  HEENT: PERRL, +EOMI, anicteric, no [place]  NECK: Supple, No JVD   CHEST/LUNG: CTA bilaterally; Normal effort  HEART: S1S2 Normal intensity, no murmurs, gallops or rubs noted  ABDOMEN: Soft, BS Normoactive, NT, ND, no HSM noted  EXTREMITIES:  2+ radial and DP pulses noted, no clubbing, cyanosis, or edema noted, Limited mobility   SKIN: No rashes or lesions noted  NEURO: Lethargy, confusion , no focal deficits noted, CN II-XII intact  PSYCH:  Depression delirious  and affect; insight/judgement inappropriate  LABS:                        7.4    16.62 )-----------( 581      ( 10 Nov 2021 07:10 )             23.9     11-10    145  |  108<H>  |  11.8  ----------------------------<  90  4.1   |  24.0  |  0.53    Ca    8.2<L>      10 Nov 2021 07:10    TPro  7.4  /  Alb  2.9<L>  /  TBili  1.0  /  DBili  x   /  AST  15  /  ALT  13  /  AlkPhos  105  11-10    PT/INR - ( 10 Nov 2021 07:10 )   PT: 33.4 sec;   INR: 3.04 ratio             RADIOLOGY & ADDITIONAL TESTS:    MEDICATIONS:  MEDICATIONS  (STANDING):  ascorbic acid 500 milliGRAM(s) Oral daily  chlorhexidine 4% Liquid 1 Application(s) Topical <User Schedule>  ferrous    sulfate 325 milliGRAM(s) Oral two times a day  folic acid 1 milliGRAM(s) Oral daily  levETIRAcetam 750 milliGRAM(s) Oral two times a day  multivitamin 1 Tablet(s) Oral daily  mupirocin 2% Nasal 1 Application(s) Nasal every 12 hours  pantoprazole   Suspension 40 milliGRAM(s) Oral before breakfast  PARoxetine 30 milliGRAM(s) Oral daily  petrolatum Ophthalmic Ointment 1 Application(s) Both EYES every 6 hours  polyethylene glycol 3350 17 Gram(s) Oral at bedtime  senna 2 Tablet(s) Oral at bedtime    MEDICATIONS  (PRN):

## 2021-11-11 NOTE — PROCEDURE NOTE - NSSITEPREP_SKIN_A_CORE
chlorhexidine/Adherence to aseptic technique: hand hygiene prior to donning barriers (gown, gloves), don cap and mask, sterile drape over patient
chlorhexidine/Adherence to aseptic technique: hand hygiene prior to donning barriers (gown, gloves), don cap and mask, sterile drape over patient
chlorhexidine
chlorhexidine/Adherence to aseptic technique: hand hygiene prior to donning barriers (gown, gloves), don cap and mask, sterile drape over patient

## 2021-11-11 NOTE — PROCEDURE NOTE - ADDITIONAL PROCEDURE DETAILS
18G IV sono guided good heme return ns flush right median vein
18G 10CM 40CIRC [place] POWER GLIDE MIDLINE good heme return ns flush right cephalic vein
20G IV sono guided good heme return ns flush left median vein

## 2021-11-11 NOTE — PROCEDURE NOTE - NSINFORMCONSENT_GEN_A_CORE
Benefits, risks, and possible complications of procedure explained to patient/caregiver who verbalized understanding and gave verbal consent.
Benefits, risks, and possible complications of procedure explained to patient/caregiver who verbalized understanding and gave written consent.

## 2021-11-11 NOTE — ADVANCED PRACTICE NURSE CONSULT - RECOMMEDATIONS
·	Right Ischial Tuberosity - pack with small Kerlix cover with Allevyn - change daily or with incontinence care  ·	InterDry to gluteal folds  - separate deep creases to posterior thigh region   ·	Cavilon Advanced to Buttocks/Coccyx - Q3days   ·	Turn and Reposition Q2H - offload sacrum to side - alternate sides with noe-form pillow  ·	Incontinence care with repositioning - minimum Q2H 
·	Cavilon Advanced to Buttocks/Coccyx - Q3days   ·	Turn and Reposition Q2H - offload sacrum to side - alternate sides with noe-form pillow  ·	Incontinence care with repositioning - minimum Q2H  ·	2 RN skin assessment daily and with transfer to new unit

## 2021-11-12 ENCOUNTER — APPOINTMENT (OUTPATIENT)
Dept: OPHTHALMOLOGY | Facility: CLINIC | Age: 46
End: 2021-11-12

## 2021-11-12 LAB
ALBUMIN SERPL ELPH-MCNC: 2.8 G/DL — LOW (ref 3.3–5.2)
ALP SERPL-CCNC: 105 U/L — SIGNIFICANT CHANGE UP (ref 40–120)
ALT FLD-CCNC: 16 U/L — SIGNIFICANT CHANGE UP
ANION GAP SERPL CALC-SCNC: 10 MMOL/L — SIGNIFICANT CHANGE UP (ref 5–17)
AST SERPL-CCNC: 22 U/L — SIGNIFICANT CHANGE UP
BILIRUB SERPL-MCNC: 1 MG/DL — SIGNIFICANT CHANGE UP (ref 0.4–2)
BUN SERPL-MCNC: 11.4 MG/DL — SIGNIFICANT CHANGE UP (ref 8–20)
CALCIUM SERPL-MCNC: 8.7 MG/DL — SIGNIFICANT CHANGE UP (ref 8.6–10.2)
CHLORIDE SERPL-SCNC: 107 MMOL/L — SIGNIFICANT CHANGE UP (ref 98–107)
CO2 SERPL-SCNC: 23 MMOL/L — SIGNIFICANT CHANGE UP (ref 22–29)
CREAT SERPL-MCNC: 0.5 MG/DL — SIGNIFICANT CHANGE UP (ref 0.5–1.3)
GLUCOSE SERPL-MCNC: 84 MG/DL — SIGNIFICANT CHANGE UP (ref 70–99)
HCT VFR BLD CALC: 28.9 % — LOW (ref 34.5–45)
HGB BLD-MCNC: 8.7 G/DL — LOW (ref 11.5–15.5)
INR BLD: 1.69 RATIO — HIGH (ref 0.88–1.16)
MCHC RBC-ENTMCNC: 27.8 PG — SIGNIFICANT CHANGE UP (ref 27–34)
MCHC RBC-ENTMCNC: 30.1 GM/DL — LOW (ref 32–36)
MCV RBC AUTO: 92.3 FL — SIGNIFICANT CHANGE UP (ref 80–100)
PLATELET # BLD AUTO: 549 K/UL — HIGH (ref 150–400)
POTASSIUM SERPL-MCNC: 4.1 MMOL/L — SIGNIFICANT CHANGE UP (ref 3.5–5.3)
POTASSIUM SERPL-SCNC: 4.1 MMOL/L — SIGNIFICANT CHANGE UP (ref 3.5–5.3)
PROT SERPL-MCNC: 8.3 G/DL — SIGNIFICANT CHANGE UP (ref 6.6–8.7)
PROTHROM AB SERPL-ACNC: 19.1 SEC — HIGH (ref 10.6–13.6)
RBC # BLD: 3.13 M/UL — LOW (ref 3.8–5.2)
RBC # FLD: 18.3 % — HIGH (ref 10.3–14.5)
SARS-COV-2 RNA SPEC QL NAA+PROBE: SIGNIFICANT CHANGE UP
SODIUM SERPL-SCNC: 140 MMOL/L — SIGNIFICANT CHANGE UP (ref 135–145)
WBC # BLD: 12.65 K/UL — HIGH (ref 3.8–10.5)
WBC # FLD AUTO: 12.65 K/UL — HIGH (ref 3.8–10.5)

## 2021-11-12 PROCEDURE — 99233 SBSQ HOSP IP/OBS HIGH 50: CPT

## 2021-11-12 PROCEDURE — 99232 SBSQ HOSP IP/OBS MODERATE 35: CPT

## 2021-11-12 RX ORDER — WARFARIN SODIUM 2.5 MG/1
3 TABLET ORAL ONCE
Refills: 0 | Status: COMPLETED | OUTPATIENT
Start: 2021-11-12 | End: 2021-11-12

## 2021-11-12 RX ADMIN — CHLORHEXIDINE GLUCONATE 1 APPLICATION(S): 213 SOLUTION TOPICAL at 06:27

## 2021-11-12 RX ADMIN — POLYETHYLENE GLYCOL 3350 17 GRAM(S): 17 POWDER, FOR SOLUTION ORAL at 21:32

## 2021-11-12 RX ADMIN — Medication 1 APPLICATION(S): at 00:35

## 2021-11-12 RX ADMIN — MUPIROCIN 1 APPLICATION(S): 20 OINTMENT TOPICAL at 18:44

## 2021-11-12 RX ADMIN — Medication 1 MILLIGRAM(S): at 11:07

## 2021-11-12 RX ADMIN — WARFARIN SODIUM 3 MILLIGRAM(S): 2.5 TABLET ORAL at 21:31

## 2021-11-12 RX ADMIN — PANTOPRAZOLE SODIUM 40 MILLIGRAM(S): 20 TABLET, DELAYED RELEASE ORAL at 05:54

## 2021-11-12 RX ADMIN — Medication 325 MILLIGRAM(S): at 05:54

## 2021-11-12 RX ADMIN — MUPIROCIN 1 APPLICATION(S): 20 OINTMENT TOPICAL at 06:26

## 2021-11-12 RX ADMIN — Medication 1 TABLET(S): at 11:08

## 2021-11-12 RX ADMIN — Medication 30 MILLIGRAM(S): at 11:08

## 2021-11-12 RX ADMIN — LEVETIRACETAM 750 MILLIGRAM(S): 250 TABLET, FILM COATED ORAL at 05:54

## 2021-11-12 RX ADMIN — SENNA PLUS 2 TABLET(S): 8.6 TABLET ORAL at 21:30

## 2021-11-12 RX ADMIN — LEVETIRACETAM 750 MILLIGRAM(S): 250 TABLET, FILM COATED ORAL at 18:40

## 2021-11-12 RX ADMIN — Medication 500 MILLIGRAM(S): at 11:08

## 2021-11-12 RX ADMIN — Medication 1 APPLICATION(S): at 11:09

## 2021-11-12 RX ADMIN — Medication 1 APPLICATION(S): at 05:54

## 2021-11-12 RX ADMIN — Medication 325 MILLIGRAM(S): at 18:40

## 2021-11-12 RX ADMIN — Medication 1 APPLICATION(S): at 18:44

## 2021-11-12 NOTE — PROGRESS NOTE ADULT - SUBJECTIVE AND OBJECTIVE BOX
INFECTIOUS DISEASES AND INTERNAL MEDICINE at [place]  =======================================================  Wan Raya MD  Diplomates American Board of Internal Medicine and Infectious Diseases  Telephone [phone]  Fax            [phone]  =======================================================    RASHAUN FLORES [id number]    Follow up: AMS  BACTEREMIA ESBL    Allergies:  No Known Allergies      Medications:  chlorhexidine 4% Liquid 1 Application(s) Topical <User Schedule>  heparin   Injectable 8000 Unit(s) IV Push every 6 hours PRN  heparin   Injectable 4000 Unit(s) IV Push every 6 hours PRN  heparin  Infusion.  Unit(s)/Hr IV Continuous <Continuous>  lactated ringers. 1000 milliLiter(s) IV Continuous <Continuous>  levETIRAcetam  IVPB 1000 milliGRAM(s) IV Intermittent every 12 hours  meropenem  IVPB 1000 milliGRAM(s) IV Intermittent every 12 hours  pantoprazole  Injectable 40 milliGRAM(s) IV Push daily  petrolatum Ophthalmic Ointment 1 Application(s) Both EYES every 6 hours  potassium chloride  20 mEq/100 mL IVPB 20 milliEquivalent(s) IV Intermittent every 2 hours  valproate sodium IVPB 500 milliGRAM(s) IV Intermittent every 12 hours    SOCIAL       FAMILY   FAMILY HISTORY:  FH: stroke  dad    FH: diabetes mellitus  mom    Family history of hepatitis C  mom      REVIEW OF SYSTEMS:  CONSTITUTIONAL:  No Fever or chills  HEENT:   No diplopia or blurred vision.  No earache, sore throat or runny nose.  CARDIOVASCULAR:  No pressure, squeezing, strangling, tightness, heaviness or aching about the chest, neck, axilla or epigastrium.  RESPIRATORY:  No cough, shortness of breath, PND or orthopnea.  GASTROINTESTINAL:  No nausea, vomiting or diarrhea.  GENITOURINARY:  No dysuria, frequency or urgency. No Blood in urine  MUSCULOSKELETAL:   moves all joints  SKIN:  No change in skin, hair or nails.  NEUROLOGIC:  MORE AWAKE ANSWERS   QUESTIONS         Physical Exam:   Vital Signs Last 24 Hrs  T(C): 36.6 (12 Nov 2021 08:00), Max: 37.1 (11 Nov 2021 20:00)  T(F): 97.8 (12 Nov 2021 08:00), Max: 98.8 (11 Nov 2021 20:00)  HR: 90 (12 Nov 2021 10:00) (73 - 111)  BP: 123/70 (12 Nov 2021 10:00) (108/68 - 138/82)  BP(mean): 81 (12 Nov 2021 10:00) (68 - 81)  RR: 16 (12 Nov 2021 10:00) (16 - 20)  SpO2: 100% (12 Nov 2021 10:00) (99% - 100%)    GEN: NAD,  AWAKE  ANSWERS  QUESTIONS   HEENT: normocephalic and atraumatic. EOMI. RAYNE.    NECK: Supple. No carotid bruits.  No lymphadenopathy or thyromegaly.  LUNGS: Clear to auscultation.  HEART: Regular rate and rhythm without murmur.  ABDOMEN: Soft, nontender, and nondistended.  Positive bowel sounds.    : No CVA tenderness  EXTREMITIES: Without any cyanosis, clubbing, rash, lesions or edema.  MSK: no joint swelling  NEUROLOGIC:  PAPRAPLEGIA        Labs:  Vitals:  ===     =======================================================  Current Antibiotics:  meropenem  IVPB 1000 milliGRAM(s) IV Intermittent every 12 hours    Other medications:  chlorhexidine 4% Liquid 1 Application(s) Topical <User Schedule>  heparin  Infusion.  Unit(s)/Hr IV Continuous <Continuous>  lactated ringers. 1000 milliLiter(s) IV Continuous <Continuous>  levETIRAcetam  IVPB 1000 milliGRAM(s) IV Intermittent every 12 hours  pantoprazole  Injectable 40 milliGRAM(s) IV Push daily  petrolatum Ophthalmic Ointment 1 Application(s) Both EYES every 6 hours  potassium chloride  20 mEq/100 mL IVPB 20 milliEquivalent(s) IV Intermittent every 2 hours  valproate sodium IVPB 500 milliGRAM(s) IV Intermittent every 12 hours      =======================================================  Labs:                                                                                  8.7    12.65 )-----------( 549      ( 12 Nov 2021 06:07 )             28.9   11-12    140  |  107  |  11.4  ----------------------------<  84  4.1   |  23.0  |  0.50    Ca    8.7      12 Nov 2021 06:07    TPro  8.3  /  Alb  2.8<L>  /  TBili  1.0  /  DBili  x   /  AST  22  /  ALT  16  /  AlkPhos  105  11-12            WBC Count: 18.57 K/uL (10-31-21 @ 04:17)  WBC Count: 20.96 K/uL (10-30-21 @ 07:08)          Alkaline Phosphatase, Serum: 110 U/L (10-31-21 @ 04:17)  Alkaline Phosphatase, Serum: 118 U/L (10-30-21 @ 07:08)  Alanine Aminotransferase (ALT/SGPT): 18 U/L (10-31-21 @ 04:17)  Alanine Aminotransferase (ALT/SGPT): 22 U/L (10-30-21 @ 07:08)  Aspartate Aminotransferase (AST/SGOT): 18 U/L (10-31-21 @ 04:17)  Aspartate Aminotransferase (AST/SGOT): 31 U/L (10-30-21 @ 07:08)  Bilirubin Total, Serum: 0.3 mg/dL (10-31-21 @ 04:17)  Bilirubin Total, Serum: 0.3 mg/dL (10-30-21 @ 07:08)

## 2021-11-12 NOTE — PROGRESS NOTE ADULT - SUBJECTIVE AND OBJECTIVE BOX
CC: Encephalopathy from traumatic brain injury.   Paraplegia  Right thigh hematoma  Anemia status post 1 unit prbc transfusion.   HPI:  46F w/ PMHx TBI, functional paraplegia, W/C bound at baseline, C spine fixation, s/p trach/ PEG (now decannulated), seizure d/o, urinary retention w/ indwelling arthur, DVT on Coumadin, ESBL E Coli bacteriemia [place] [facility] [place] and now transferred to [facility] for cvEEG. [place] [facility] [place] was altered, hypotensive, febrile 101 and in DIEUDONNE w/ a supra therapeutic INR ~ 9.5. Code stroke was called and stat CTH was grossly normal. Later a code sepsis was called and pt was resuscitated and given IV Abx. Her neuro status further worsened, and she became unresponsive to noxious stimuli. Not intubated as he was a DNR/DNI. Pt was loaded w/ Keppra and another CTH was requested which was also WNLs. A 3rd CTH was obtained 6hrs later which was also normal.   Over the next 24hrs she was on and off pressors and weaned of VM to RA. INR came down to 2.7 after Vit K. EEG was suggestive of a catastrophic neurological insult w/ B/L GPEDs. BCx were positive for ESBL E Coli and Meropenem was switched to Ayvcaz as per ID recs. Pt was loaded w/ VA 2g prior to transfer to [facility]  (30 Oct 2021 05:02)    REVIEW OF SYSTEMS:    Patient denied fever, chills, abdominal pain, nausea, vomiting, cough, shortness of breath, chest pain or palpitations    Vital Signs Last 24 Hrs  T(C): 36.6 (12 Nov 2021 08:00), Max: 37.1 (11 Nov 2021 20:00)  T(F): 97.8 (12 Nov 2021 08:00), Max: 98.8 (11 Nov 2021 20:00)  HR: 73 (12 Nov 2021 08:00) (73 - 111)  BP: 123/51 (12 Nov 2021 08:00) (108/68 - 138/82)  BP(mean): 68 (12 Nov 2021 08:00) (68 - 68)  RR: 18 (12 Nov 2021 08:00) (18 - 20)  SpO2: 100% (12 Nov 2021 08:00) (99% - 100%)I&O's Summary    11 Nov 2021 07:01  -  12 Nov 2021 07:00  --------------------------------------------------------  IN: 655 mL / OUT: 1275 mL / NET: -620 mL      PHYSICAL EXAM:  GENERAL: NAD,   HEENT: PERRL, +EOMI, anicteric, no [place]  NECK: Supple, No JVD   CHEST/LUNG: CTA bilaterally; Normal effort  HEART: S1S2 Normal intensity, no murmurs, gallops or rubs noted  ABDOMEN: Soft, BS Normoactive, NT, ND, no HSM noted  EXTREMITIES:  2+ radial and DP pulses noted, no clubbing, cyanosis, or edema noted, Bed bound   SKIN: No rashes or lesions noted  NEURO: Confusion, lethargy, paraplegia  CN II-XII intact  PSYCH: Depressed  mood and affect; insight/judgement inappropriate  LABS:                        8.7    12.65 )-----------( 549      ( 12 Nov 2021 06:07 )             28.9     11-12    140  |  107  |  11.4  ----------------------------<  84  4.1   |  23.0  |  0.50    Ca    8.7      12 Nov 2021 06:07    TPro  8.3  /  Alb  2.8<L>  /  TBili  1.0  /  DBili  x   /  AST  22  /  ALT  16  /  AlkPhos  105  11-12    PT/INR - ( 12 Nov 2021 06:07 )   PT: 19.1 sec;   INR: 1.69 ratio             RADIOLOGY & ADDITIONAL TESTS:    MEDICATIONS:  MEDICATIONS  (STANDING):  ascorbic acid 500 milliGRAM(s) Oral daily  chlorhexidine 4% Liquid 1 Application(s) Topical <User Schedule>  ferrous    sulfate 325 milliGRAM(s) Oral two times a day  folic acid 1 milliGRAM(s) Oral daily  levETIRAcetam 750 milliGRAM(s) Oral two times a day  multivitamin 1 Tablet(s) Oral daily  mupirocin 2% Nasal 1 Application(s) Nasal every 12 hours  pantoprazole   Suspension 40 milliGRAM(s) Oral before breakfast  PARoxetine 30 milliGRAM(s) Oral daily  petrolatum Ophthalmic Ointment 1 Application(s) Both EYES every 6 hours  polyethylene glycol 3350 17 Gram(s) Oral at bedtime  senna 2 Tablet(s) Oral at bedtime  warfarin 3 milliGRAM(s) Oral once    MEDICATIONS  (PRN):
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
## 2021-11-12 NOTE — PROGRESS NOTE ADULT - ASSESSMENT
46F w/ PMHx TBI, functional paraplegia, W/C bound at baseline, C spine fixation, s/p trach/ PEG (now decannulated), seizure d/o, urinary retention w/ indwelling arthur, DVT on Coumadin, ESBL E Coli bacteriemia was sent over from NH to [facility] [place] w/ [place] and now transferred [place] [facility] [place]G. On presentation to [facility] she was altered, hypotensive, febrile 101 and in DIEUDONNE w/ a supra therapeutic INR ~ 9.5. Code stroke was called and stat CTH was grossly normal. Later a code sepsis was called and pt was resuscitated and given IV Abx. Her neuro status further worsened, and she became unresponsive to noxious stimuli. Not intubated as he was a DNR/DNI. Pt was loaded w/ Keppra and another CTH was requested which was also WNLs. A 3rd CTH was obtained 6hrs later which was also normal.   Over the next 24hrs she was on and off pressors and weaned of VM to RA. INR came down to 2.7 after Vit K. EEG was suggestive of a catastrophic neurological insult w/ B/L GPEDs. BCx were positive for ESBL E Coli   ESBL BACTEREMIA   CONTINUE   MERREM    REPEAT BLOOD CX  NEG  CSF NEG   COMPLETED A   TOTAL 2 WEEKS IV ABX FOR ESBL BACTERMIA 11/11  NOW OFF ABX      PT  WITH  LARGE RIGHT HEMATOMA   WBC DOWN TODAY  CT SCAN DONE SHOWED HEAMTOMA    OVERALL IMPROVED WILL FOLLOWUP AS NEEDED PLEASE CALL IF QUESTIONS

## 2021-11-13 LAB
ALBUMIN SERPL ELPH-MCNC: 2.7 G/DL — LOW (ref 3.3–5.2)
ALP SERPL-CCNC: 103 U/L — SIGNIFICANT CHANGE UP (ref 40–120)
ALT FLD-CCNC: 14 U/L — SIGNIFICANT CHANGE UP
ANION GAP SERPL CALC-SCNC: 9 MMOL/L — SIGNIFICANT CHANGE UP (ref 5–17)
AST SERPL-CCNC: 19 U/L — SIGNIFICANT CHANGE UP
BILIRUB SERPL-MCNC: 1 MG/DL — SIGNIFICANT CHANGE UP (ref 0.4–2)
BUN SERPL-MCNC: 10.3 MG/DL — SIGNIFICANT CHANGE UP (ref 8–20)
CALCIUM SERPL-MCNC: 8.4 MG/DL — LOW (ref 8.6–10.2)
CHLORIDE SERPL-SCNC: 105 MMOL/L — SIGNIFICANT CHANGE UP (ref 98–107)
CO2 SERPL-SCNC: 24 MMOL/L — SIGNIFICANT CHANGE UP (ref 22–29)
CREAT SERPL-MCNC: 0.44 MG/DL — LOW (ref 0.5–1.3)
GLUCOSE SERPL-MCNC: 83 MG/DL — SIGNIFICANT CHANGE UP (ref 70–99)
HCT VFR BLD CALC: 30.3 % — LOW (ref 34.5–45)
HGB BLD-MCNC: 8.8 G/DL — LOW (ref 11.5–15.5)
INR BLD: 1.51 RATIO — HIGH (ref 0.88–1.16)
MCHC RBC-ENTMCNC: 27.2 PG — SIGNIFICANT CHANGE UP (ref 27–34)
MCHC RBC-ENTMCNC: 29 GM/DL — LOW (ref 32–36)
MCV RBC AUTO: 93.5 FL — SIGNIFICANT CHANGE UP (ref 80–100)
PLATELET # BLD AUTO: 601 K/UL — HIGH (ref 150–400)
POTASSIUM SERPL-MCNC: 3.8 MMOL/L — SIGNIFICANT CHANGE UP (ref 3.5–5.3)
POTASSIUM SERPL-SCNC: 3.8 MMOL/L — SIGNIFICANT CHANGE UP (ref 3.5–5.3)
PROT SERPL-MCNC: 8.5 G/DL — SIGNIFICANT CHANGE UP (ref 6.6–8.7)
PROTHROM AB SERPL-ACNC: 17.2 SEC — HIGH (ref 10.6–13.6)
RBC # BLD: 3.24 M/UL — LOW (ref 3.8–5.2)
RBC # FLD: 18.5 % — HIGH (ref 10.3–14.5)
SODIUM SERPL-SCNC: 138 MMOL/L — SIGNIFICANT CHANGE UP (ref 135–145)
WBC # BLD: 10.28 K/UL — SIGNIFICANT CHANGE UP (ref 3.8–10.5)
WBC # FLD AUTO: 10.28 K/UL — SIGNIFICANT CHANGE UP (ref 3.8–10.5)

## 2021-11-13 PROCEDURE — 99233 SBSQ HOSP IP/OBS HIGH 50: CPT

## 2021-11-13 RX ORDER — WARFARIN SODIUM 2.5 MG/1
5 TABLET ORAL ONCE
Refills: 0 | Status: COMPLETED | OUTPATIENT
Start: 2021-11-13 | End: 2021-11-13

## 2021-11-13 RX ADMIN — MUPIROCIN 1 APPLICATION(S): 20 OINTMENT TOPICAL at 05:12

## 2021-11-13 RX ADMIN — WARFARIN SODIUM 5 MILLIGRAM(S): 2.5 TABLET ORAL at 23:36

## 2021-11-13 RX ADMIN — SENNA PLUS 2 TABLET(S): 8.6 TABLET ORAL at 23:37

## 2021-11-13 RX ADMIN — Medication 500 MILLIGRAM(S): at 13:10

## 2021-11-13 RX ADMIN — CHLORHEXIDINE GLUCONATE 1 APPLICATION(S): 213 SOLUTION TOPICAL at 05:14

## 2021-11-13 RX ADMIN — MUPIROCIN 1 APPLICATION(S): 20 OINTMENT TOPICAL at 18:27

## 2021-11-13 RX ADMIN — Medication 1 APPLICATION(S): at 05:08

## 2021-11-13 RX ADMIN — Medication 1 MILLIGRAM(S): at 13:10

## 2021-11-13 RX ADMIN — Medication 325 MILLIGRAM(S): at 05:12

## 2021-11-13 RX ADMIN — LEVETIRACETAM 750 MILLIGRAM(S): 250 TABLET, FILM COATED ORAL at 05:12

## 2021-11-13 RX ADMIN — Medication 30 MILLIGRAM(S): at 13:10

## 2021-11-13 RX ADMIN — Medication 1 APPLICATION(S): at 01:21

## 2021-11-13 RX ADMIN — Medication 325 MILLIGRAM(S): at 18:27

## 2021-11-13 RX ADMIN — LEVETIRACETAM 750 MILLIGRAM(S): 250 TABLET, FILM COATED ORAL at 18:27

## 2021-11-13 RX ADMIN — PANTOPRAZOLE SODIUM 40 MILLIGRAM(S): 20 TABLET, DELAYED RELEASE ORAL at 05:14

## 2021-11-13 RX ADMIN — Medication 1 TABLET(S): at 13:10

## 2021-11-13 NOTE — PROGRESS NOTE ADULT - SUBJECTIVE AND OBJECTIVE BOX
CC: TBI with paraparesis.  Encephalopathy due to TBI   Right thigh hematoma.   HPI:  46F w/ PMHx TBI, functional paraplegia, W/C bound at baseline, C spine fixation, s/p trach/ PEG (now decannulated), seizure d/o, urinary retention w/ indwelling arthur, DVT on Coumadin, ESBL E Coli bacteriemia was sent over from NH to [facility] [place] w/ AMS and now transferred to [facility] for cvEEG. On presentation to [facility] she was altered, hypotensive, febrile 101 and in DIEUDONNE w/ a supra therapeutic INR ~ 9.5. Code stroke was called and stat CTH was grossly normal. Later a code sepsis was called and pt was resuscitated and given IV Abx. Her neuro status further worsened, and she became unresponsive to noxious stimuli. Not intubated as he was a DNR/DNI. Pt was loaded w/ Keppra and another CTH was requested which was also WNLs. A 3rd CTH was obtained 6hrs later which was also normal.   Over the next 24hrs she was on and off pressors and weaned of VM to RA. INR came down to 2.7 after Vit K. EEG was suggestive of a catastrophic neurological insult w/ B/L GPEDs. BCx were positive for ESBL E Coli and Meropenem was switched to Ayvcaz as per ID recs. Pt was loaded w/ VA 2g prior to transfer to [facility]  (30 Oct 2021 05:02)    REVIEW OF SYSTEMS:    Patient denied fever, chills, abdominal pain, nausea, vomiting, cough, shortness of breath, chest pain or palpitations    Vital Signs Last 24 Hrs  T(C): 37.2 (13 Nov 2021 12:00), Max: 37.2 (13 Nov 2021 12:00)  T(F): 99 (13 Nov 2021 12:00), Max: 99 (13 Nov 2021 12:00)  HR: 74 (13 Nov 2021 12:00) (72 - 88)  BP: 122/61 (13 Nov 2021 12:00) (110/65 - 124/66)  BP(mean): 72 (13 Nov 2021 12:00) (70 - 73)  RR: 18 (13 Nov 2021 12:00) (16 - 19)  SpO2: 100% (13 Nov 2021 12:00) (98% - 100%)I&O's Summary    12 Nov 2021 07:01  -  13 Nov 2021 07:00  --------------------------------------------------------  IN: 0 mL / OUT: 1005 mL / NET: -1005 mL    13 Nov 2021 07:01  -  13 Nov 2021 14:05  --------------------------------------------------------  IN: 0 mL / OUT: 60 mL / NET: -60 mL      PHYSICAL EXAM:  GENERAL: NAD,   HEENT: PERRL, +EOMI, anicteric, no [place]  NECK: Supple, No JVD   CHEST/LUNG: CTA bilaterally; Normal effort  HEART: S1S2 Normal intensity, no murmurs, gallops or rubs noted  ABDOMEN: Soft, BS Normoactive, NT, ND, no HSM noted  EXTREMITIES: No cyanosis, Right thigh edema  noted, Limited mobility   SKIN: No rashes or lesions noted  NEURO: Confusion delirium , no focal deficits noted, CN II-XII intact  PSYCH: Delirium mood and affect; insight/judgement inappropriate  LABS:                        8.8    10.28 )-----------( 601      ( 13 Nov 2021 06:32 )             30.3     11-13    138  |  105  |  10.3  ----------------------------<  83  3.8   |  24.0  |  0.44<L>    Ca    8.4<L>      13 Nov 2021 06:32    TPro  8.5  /  Alb  2.7<L>  /  TBili  1.0  /  DBili  x   /  AST  19  /  ALT  14  /  AlkPhos  103  11-13    PT/INR - ( 13 Nov 2021 06:32 )   PT: 17.2 sec;   INR: 1.51 ratio             RADIOLOGY & ADDITIONAL TESTS:    MEDICATIONS:  MEDICATIONS  (STANDING):  ascorbic acid 500 milliGRAM(s) Oral daily  chlorhexidine 4% Liquid 1 Application(s) Topical <User Schedule>  ferrous    sulfate 325 milliGRAM(s) Oral two times a day  folic acid 1 milliGRAM(s) Oral daily  levETIRAcetam 750 milliGRAM(s) Oral two times a day  multivitamin 1 Tablet(s) Oral daily  mupirocin 2% Nasal 1 Application(s) Nasal every 12 hours  pantoprazole   Suspension 40 milliGRAM(s) Oral before breakfast  PARoxetine 30 milliGRAM(s) Oral daily  petrolatum Ophthalmic Ointment 1 Application(s) Both EYES every 6 hours  polyethylene glycol 3350 17 Gram(s) Oral at bedtime  senna 2 Tablet(s) Oral at bedtime    MEDICATIONS  (PRN):
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
## 2021-11-14 LAB
ALBUMIN SERPL ELPH-MCNC: 2.9 G/DL — LOW (ref 3.3–5.2)
ALP SERPL-CCNC: 110 U/L — SIGNIFICANT CHANGE UP (ref 40–120)
ALT FLD-CCNC: 15 U/L — SIGNIFICANT CHANGE UP
ANION GAP SERPL CALC-SCNC: 12 MMOL/L — SIGNIFICANT CHANGE UP (ref 5–17)
AST SERPL-CCNC: 20 U/L — SIGNIFICANT CHANGE UP
BILIRUB SERPL-MCNC: 1 MG/DL — SIGNIFICANT CHANGE UP (ref 0.4–2)
BUN SERPL-MCNC: 9.6 MG/DL — SIGNIFICANT CHANGE UP (ref 8–20)
CALCIUM SERPL-MCNC: 8.3 MG/DL — LOW (ref 8.6–10.2)
CHLORIDE SERPL-SCNC: 105 MMOL/L — SIGNIFICANT CHANGE UP (ref 98–107)
CO2 SERPL-SCNC: 23 MMOL/L — SIGNIFICANT CHANGE UP (ref 22–29)
CREAT SERPL-MCNC: 0.51 MG/DL — SIGNIFICANT CHANGE UP (ref 0.5–1.3)
GLUCOSE SERPL-MCNC: 93 MG/DL — SIGNIFICANT CHANGE UP (ref 70–99)
HCT VFR BLD CALC: 34.1 % — LOW (ref 34.5–45)
HGB BLD-MCNC: 10.5 G/DL — LOW (ref 11.5–15.5)
INR BLD: 1.48 RATIO — HIGH (ref 0.88–1.16)
MCHC RBC-ENTMCNC: 27.9 PG — SIGNIFICANT CHANGE UP (ref 27–34)
MCHC RBC-ENTMCNC: 30.8 GM/DL — LOW (ref 32–36)
MCV RBC AUTO: 90.5 FL — SIGNIFICANT CHANGE UP (ref 80–100)
PLATELET # BLD AUTO: 655 K/UL — HIGH (ref 150–400)
POTASSIUM SERPL-MCNC: 3.7 MMOL/L — SIGNIFICANT CHANGE UP (ref 3.5–5.3)
POTASSIUM SERPL-SCNC: 3.7 MMOL/L — SIGNIFICANT CHANGE UP (ref 3.5–5.3)
PROT SERPL-MCNC: 8.6 G/DL — SIGNIFICANT CHANGE UP (ref 6.6–8.7)
PROTHROM AB SERPL-ACNC: 16.8 SEC — HIGH (ref 10.6–13.6)
RBC # BLD: 3.77 M/UL — LOW (ref 3.8–5.2)
RBC # FLD: 17.7 % — HIGH (ref 10.3–14.5)
SODIUM SERPL-SCNC: 140 MMOL/L — SIGNIFICANT CHANGE UP (ref 135–145)
WBC # BLD: 9.86 K/UL — SIGNIFICANT CHANGE UP (ref 3.8–10.5)
WBC # FLD AUTO: 9.86 K/UL — SIGNIFICANT CHANGE UP (ref 3.8–10.5)

## 2021-11-14 PROCEDURE — 99233 SBSQ HOSP IP/OBS HIGH 50: CPT

## 2021-11-14 RX ORDER — OXYCODONE HYDROCHLORIDE 5 MG/1
5 TABLET ORAL ONCE
Refills: 0 | Status: DISCONTINUED | OUTPATIENT
Start: 2021-11-14 | End: 2021-11-14

## 2021-11-14 RX ORDER — WARFARIN SODIUM 2.5 MG/1
3 TABLET ORAL ONCE
Refills: 0 | Status: COMPLETED | OUTPATIENT
Start: 2021-11-14 | End: 2021-11-14

## 2021-11-14 RX ADMIN — POLYETHYLENE GLYCOL 3350 17 GRAM(S): 17 POWDER, FOR SOLUTION ORAL at 21:26

## 2021-11-14 RX ADMIN — Medication 325 MILLIGRAM(S): at 17:22

## 2021-11-14 RX ADMIN — OXYCODONE HYDROCHLORIDE 5 MILLIGRAM(S): 5 TABLET ORAL at 18:57

## 2021-11-14 RX ADMIN — Medication 1 APPLICATION(S): at 23:20

## 2021-11-14 RX ADMIN — OXYCODONE HYDROCHLORIDE 5 MILLIGRAM(S): 5 TABLET ORAL at 17:37

## 2021-11-14 RX ADMIN — SENNA PLUS 2 TABLET(S): 8.6 TABLET ORAL at 21:26

## 2021-11-14 RX ADMIN — LEVETIRACETAM 750 MILLIGRAM(S): 250 TABLET, FILM COATED ORAL at 17:22

## 2021-11-14 RX ADMIN — MUPIROCIN 1 APPLICATION(S): 20 OINTMENT TOPICAL at 17:23

## 2021-11-14 RX ADMIN — Medication 1 APPLICATION(S): at 05:25

## 2021-11-14 RX ADMIN — Medication 325 MILLIGRAM(S): at 05:24

## 2021-11-14 RX ADMIN — CHLORHEXIDINE GLUCONATE 1 APPLICATION(S): 213 SOLUTION TOPICAL at 06:41

## 2021-11-14 RX ADMIN — LEVETIRACETAM 750 MILLIGRAM(S): 250 TABLET, FILM COATED ORAL at 05:24

## 2021-11-14 RX ADMIN — Medication 1 TABLET(S): at 11:45

## 2021-11-14 RX ADMIN — PANTOPRAZOLE SODIUM 40 MILLIGRAM(S): 20 TABLET, DELAYED RELEASE ORAL at 06:41

## 2021-11-14 RX ADMIN — WARFARIN SODIUM 3 MILLIGRAM(S): 2.5 TABLET ORAL at 21:26

## 2021-11-14 RX ADMIN — Medication 1 APPLICATION(S): at 11:45

## 2021-11-14 RX ADMIN — Medication 30 MILLIGRAM(S): at 11:45

## 2021-11-14 RX ADMIN — Medication 500 MILLIGRAM(S): at 11:45

## 2021-11-14 RX ADMIN — Medication 1 MILLIGRAM(S): at 11:45

## 2021-11-14 RX ADMIN — MUPIROCIN 1 APPLICATION(S): 20 OINTMENT TOPICAL at 05:24
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
## 2021-11-15 DIAGNOSIS — Z86.69 PERSONAL HISTORY OF OTHER DISEASES OF THE NERVOUS SYSTEM AND SENSE ORGANS: ICD-10-CM

## 2021-11-15 DIAGNOSIS — S06.9X9A UNSPECIFIED INTRACRANIAL INJURY WITH LOSS OF CONSCIOUSNESS OF UNSPECIFIED DURATION, INITIAL ENCOUNTER: ICD-10-CM

## 2021-11-15 DIAGNOSIS — R78.81 BACTEREMIA: ICD-10-CM

## 2021-11-15 DIAGNOSIS — F44.4 CONVERSION DISORDER WITH MOTOR SYMPTOM OR DEFICIT: ICD-10-CM

## 2021-11-15 DIAGNOSIS — I82.409 ACUTE EMBOLISM AND THROMBOSIS OF UNSPECIFIED DEEP VEINS OF UNSPECIFIED LOWER EXTREMITY: ICD-10-CM

## 2021-11-15 DIAGNOSIS — G93.41 METABOLIC ENCEPHALOPATHY: ICD-10-CM

## 2021-11-15 LAB
INR BLD: 1.78 RATIO — HIGH (ref 0.88–1.16)
PROTHROM AB SERPL-ACNC: 20.1 SEC — HIGH (ref 10.6–13.6)

## 2021-11-15 PROCEDURE — 99233 SBSQ HOSP IP/OBS HIGH 50: CPT

## 2021-11-15 RX ORDER — WARFARIN SODIUM 2.5 MG/1
5 TABLET ORAL ONCE
Refills: 0 | Status: COMPLETED | OUTPATIENT
Start: 2021-11-15 | End: 2021-11-15

## 2021-11-15 RX ADMIN — Medication 1 TABLET(S): at 11:34

## 2021-11-15 RX ADMIN — WARFARIN SODIUM 5 MILLIGRAM(S): 2.5 TABLET ORAL at 21:20

## 2021-11-15 RX ADMIN — Medication 325 MILLIGRAM(S): at 17:12

## 2021-11-15 RX ADMIN — PANTOPRAZOLE SODIUM 40 MILLIGRAM(S): 20 TABLET, DELAYED RELEASE ORAL at 05:29

## 2021-11-15 RX ADMIN — Medication 1 MILLIGRAM(S): at 11:37

## 2021-11-15 RX ADMIN — Medication 30 MILLIGRAM(S): at 11:34

## 2021-11-15 RX ADMIN — Medication 1 APPLICATION(S): at 11:34

## 2021-11-15 RX ADMIN — MUPIROCIN 1 APPLICATION(S): 20 OINTMENT TOPICAL at 17:12

## 2021-11-15 RX ADMIN — Medication 325 MILLIGRAM(S): at 05:29

## 2021-11-15 RX ADMIN — MUPIROCIN 1 APPLICATION(S): 20 OINTMENT TOPICAL at 05:30

## 2021-11-15 RX ADMIN — Medication 1 APPLICATION(S): at 05:29

## 2021-11-15 RX ADMIN — POLYETHYLENE GLYCOL 3350 17 GRAM(S): 17 POWDER, FOR SOLUTION ORAL at 21:20

## 2021-11-15 RX ADMIN — Medication 500 MILLIGRAM(S): at 11:34

## 2021-11-15 RX ADMIN — CHLORHEXIDINE GLUCONATE 1 APPLICATION(S): 213 SOLUTION TOPICAL at 05:29

## 2021-11-15 RX ADMIN — LEVETIRACETAM 750 MILLIGRAM(S): 250 TABLET, FILM COATED ORAL at 05:29

## 2021-11-15 RX ADMIN — SENNA PLUS 2 TABLET(S): 8.6 TABLET ORAL at 21:20

## 2021-11-15 RX ADMIN — LEVETIRACETAM 750 MILLIGRAM(S): 250 TABLET, FILM COATED ORAL at 17:12

## 2021-11-15 NOTE — PROGRESS NOTE ADULT - SUBJECTIVE AND OBJECTIVE BOX
Patient is a 46y old  Female who presents with a chief complaint of Transfer for EEG (14 Nov 2021 13:34)      SUBJECTIVE / OVERNIGHT EVENTS: no new events.  Patient awake, verbal, but responses are not coherent    MEDICATIONS  (STANDING):  ascorbic acid 500 milliGRAM(s) Oral daily  chlorhexidine 4% Liquid 1 Application(s) Topical <User Schedule>  ferrous    sulfate 325 milliGRAM(s) Oral two times a day  folic acid 1 milliGRAM(s) Oral daily  levETIRAcetam 750 milliGRAM(s) Oral two times a day  multivitamin 1 Tablet(s) Oral daily  mupirocin 2% Nasal 1 Application(s) Nasal every 12 hours  pantoprazole   Suspension 40 milliGRAM(s) Oral before breakfast  PARoxetine 30 milliGRAM(s) Oral daily  petrolatum Ophthalmic Ointment 1 Application(s) Both EYES every 6 hours  polyethylene glycol 3350 17 Gram(s) Oral at bedtime  senna 2 Tablet(s) Oral at bedtime    MEDICATIONS  (PRN):      T(C): 36.9 (11-15-21 @ 12:00), Max: 37 (11-15-21 @ 08:00)  HR: 89 (11-15-21 @ 12:00) (80 - 113)  BP: 131/65 (11-15-21 @ 12:00) (95/54 - 136/68)  RR: 20 (11-15-21 @ 12:00) (16 - 22)  SpO2: 100% (11-15-21 @ 12:00) (96% - 100%)  CAPILLARY BLOOD GLUCOSE        I&O's Summary    14 Nov 2021 07:01  -  15 Nov 2021 07:00  --------------------------------------------------------  IN: 90 mL / OUT: 1660 mL / NET: -1570 mL    15 Nov 2021 07:01  -  15 Nov 2021 13:07  --------------------------------------------------------  IN: 0 mL / OUT: 280 mL / NET: -280 mL        PHYSICAL EXAM:  GENERAL: NAD  EYES: EOMI, PERRLA, conjunctiva and sclera clear  NECK: Supple, No JVD  CHEST/LUNG: Clear to auscultation bilaterally; No wheeze  HEART: Regular rate and rhythm; No murmurs, rubs, or gallops  ABDOMEN: Soft, Nontender, Nondistended; Bowel sounds present  EXTREMITIES:  2+ Peripheral Pulses, No clubbing, cyanosis, or edema, right leg (thigh) larger than left  PSYCH: awake, responds to name, does not aswer questions appropriately  NEUROLOGY: non-focal  SKIN: No rashes or lesions    LABS:                        10.5   9.86  )-----------( 655      ( 14 Nov 2021 06:22 )             34.1     11-14    140  |  105  |  9.6  ----------------------------<  93  3.7   |  23.0  |  0.51    Ca    8.3<L>      14 Nov 2021 06:22    TPro  8.6  /  Alb  2.9<L>  /  TBili  1.0  /  DBili  x   /  AST  20  /  ALT  15  /  AlkPhos  110  11-14    PT/INR - ( 15 Nov 2021 05:59 )   PT: 20.1 sec;   INR: 1.78 ratio                   RADIOLOGY & ADDITIONAL TESTS:    Imaging Personally Reviewed:    Consultant(s) Notes Reviewed:      Care Discussed with Consultants/Other Providers:

## 2021-11-15 NOTE — CHART NOTE - NSCHARTNOTEFT_GEN_A_CORE
Source: Patient [ ]  Family [ ]   other [ x] pt sleeping/lethargic    Current Diet: Easy to Chew with Ensure tid    PO intake:  < 50% [ x]   50-75%  [ ]   %  [ ]     Current Weight:   (11/15) 193.3 lbs  (11/13) 201.2 lbs  (11/12) 216.2 lbs  (11/6) 225.9 lbs  (11/2) 219.3 lbs    % Weight Change - wt fluctuations noted; possibly trending down if accurate- will continue to monitor.  Aware pt with 3+ right upper thigh edema noted per documentation.    Pertinent Medications: MEDICATIONS  (STANDING):  ascorbic acid 500 milliGRAM(s) Oral daily  chlorhexidine 4% Liquid 1 Application(s) Topical <User Schedule>  ferrous    sulfate 325 milliGRAM(s) Oral two times a day  folic acid 1 milliGRAM(s) Oral daily  levETIRAcetam 750 milliGRAM(s) Oral two times a day  multivitamin 1 Tablet(s) Oral daily  mupirocin 2% Nasal 1 Application(s) Nasal every 12 hours  pantoprazole   Suspension 40 milliGRAM(s) Oral before breakfast  PARoxetine 30 milliGRAM(s) Oral daily  petrolatum Ophthalmic Ointment 1 Application(s) Both EYES every 6 hours  polyethylene glycol 3350 17 Gram(s) Oral at bedtime  senna 2 Tablet(s) Oral at bedtime    MEDICATIONS  (PRN):    Pertinent Labs: CBC Full  -  ( 14 Nov 2021 06:22 )  WBC Count : 9.86 K/uL  RBC Count : 3.77 M/uL  Hemoglobin : 10.5 g/dL  Hematocrit : 34.1 %  Platelet Count - Automated : 655 K/uL  Mean Cell Volume : 90.5 fl  Mean Cell Hemoglobin : 27.9 pg  Mean Cell Hemoglobin Concentration : 30.8 gm/dL  11-14 Na140 mmol/L Glu 93 mg/dL K+ 3.7 mmol/L Cr  0.51 mg/dL BUN 9.6 mg/dL Phos n/a   Alb 2.9 g/dL<L> PAB n/a       Skin: Unstageable B/L ischial tuberosity in cleft and unstageable left thigh    LIMITED Nutrition focused physical exam conducted - found signs of malnutrition [ ]absent  [x ]present    Subcutaneous fat loss: [x ] Orbital fat pads region, [ ]Buccal fat region, [ ]Triceps region,  [ ]Ribs region    Muscle wasting: [x ]Temples region, [x ]Clavicle region, [x ]Shoulder region, [ ]Scapula region, [ ]Interosseous region,  [ ]thigh region, [ ]Calf region    Current Nutrition Diagnosis: Pt remains at nutrition risk secondary to moderate protein calorie malnutrition (acute) related to inability to consume sufficient protein energy intake with decreased appetite in setting of lethargy, acute metabolic encephalopathy, TBI and septic shock 2/2 ESBL bacteremia as evidenced by pt meeting <75% estimated energy intake >7 days, mild muscle wasting/fat loss and possible wt loss since admission.  Pt continues with poor po intake per nursing flowsheets and requires assistance at meals noted.  Pt now receiving Easy to Chew diet instead of puree; will continue to monitor diet tolerance.  RD to remain available.    Recommendations:   1. Continue with Ensure Enlive TID   2. Continue with MVI, Vit C and folic acid daily  3. Obtain daily weight and monitor trend     Monitoring and Evaluation:   [ x] PO intake [x ] Tolerance to diet prescription [X] Weights  [X] Follow up per protocol [X] Labs:

## 2021-11-15 NOTE — CHART NOTE - NSCHARTNOTESELECT_GEN_ALL_CORE
EEG/Epilepsy
Event Note
Hospitalist/Event Note
Nutrition Services
Event Note
Nutrition Services
Surgery Post Round/Event Note
Transfer Note
hospitalist/Event Note

## 2021-11-15 NOTE — INPATIENT CERTIFICATION FOR MEDICARE PATIENTS - PHYSICIAN CONCUR
I concur with the Admission Order and I certify that services are provided in accordance with Section 42 CFR § 412.3
coagulation(Bleeding disorder R/T clinical cond/anti-coags)/bones(Osteoporosis,prev fx,steroid use,metastatic bone ca)
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
## 2021-11-16 LAB
ANION GAP SERPL CALC-SCNC: 11 MMOL/L — SIGNIFICANT CHANGE UP (ref 5–17)
BUN SERPL-MCNC: 19.6 MG/DL — SIGNIFICANT CHANGE UP (ref 8–20)
CALCIUM SERPL-MCNC: 8.5 MG/DL — LOW (ref 8.6–10.2)
CHLORIDE SERPL-SCNC: 109 MMOL/L — HIGH (ref 98–107)
CO2 SERPL-SCNC: 24 MMOL/L — SIGNIFICANT CHANGE UP (ref 22–29)
CREAT SERPL-MCNC: 0.6 MG/DL — SIGNIFICANT CHANGE UP (ref 0.5–1.3)
GLUCOSE SERPL-MCNC: 103 MG/DL — HIGH (ref 70–99)
HCT VFR BLD CALC: 33.5 % — LOW (ref 34.5–45)
HGB BLD-MCNC: 10 G/DL — LOW (ref 11.5–15.5)
INR BLD: 1.76 RATIO — HIGH (ref 0.88–1.16)
MCHC RBC-ENTMCNC: 27.8 PG — SIGNIFICANT CHANGE UP (ref 27–34)
MCHC RBC-ENTMCNC: 29.9 GM/DL — LOW (ref 32–36)
MCV RBC AUTO: 93.1 FL — SIGNIFICANT CHANGE UP (ref 80–100)
PLATELET # BLD AUTO: 649 K/UL — HIGH (ref 150–400)
POTASSIUM SERPL-MCNC: 3.8 MMOL/L — SIGNIFICANT CHANGE UP (ref 3.5–5.3)
POTASSIUM SERPL-SCNC: 3.8 MMOL/L — SIGNIFICANT CHANGE UP (ref 3.5–5.3)
PROTHROM AB SERPL-ACNC: 19.9 SEC — HIGH (ref 10.6–13.6)
RBC # BLD: 3.6 M/UL — LOW (ref 3.8–5.2)
RBC # FLD: 18.3 % — HIGH (ref 10.3–14.5)
SARS-COV-2 RNA SPEC QL NAA+PROBE: SIGNIFICANT CHANGE UP
SODIUM SERPL-SCNC: 144 MMOL/L — SIGNIFICANT CHANGE UP (ref 135–145)
WBC # BLD: 10.76 K/UL — HIGH (ref 3.8–10.5)
WBC # FLD AUTO: 10.76 K/UL — HIGH (ref 3.8–10.5)

## 2021-11-16 PROCEDURE — 99232 SBSQ HOSP IP/OBS MODERATE 35: CPT

## 2021-11-16 RX ORDER — WARFARIN SODIUM 2.5 MG/1
5 TABLET ORAL ONCE
Refills: 0 | Status: COMPLETED | OUTPATIENT
Start: 2021-11-16 | End: 2021-11-16

## 2021-11-16 RX ADMIN — Medication 1 TABLET(S): at 12:00

## 2021-11-16 RX ADMIN — Medication 1 MILLIGRAM(S): at 12:00

## 2021-11-16 RX ADMIN — LEVETIRACETAM 750 MILLIGRAM(S): 250 TABLET, FILM COATED ORAL at 16:43

## 2021-11-16 RX ADMIN — SENNA PLUS 2 TABLET(S): 8.6 TABLET ORAL at 21:33

## 2021-11-16 RX ADMIN — Medication 325 MILLIGRAM(S): at 16:43

## 2021-11-16 RX ADMIN — Medication 325 MILLIGRAM(S): at 05:40

## 2021-11-16 RX ADMIN — PANTOPRAZOLE SODIUM 40 MILLIGRAM(S): 20 TABLET, DELAYED RELEASE ORAL at 05:40

## 2021-11-16 RX ADMIN — Medication 30 MILLIGRAM(S): at 12:00

## 2021-11-16 RX ADMIN — MUPIROCIN 1 APPLICATION(S): 20 OINTMENT TOPICAL at 05:41

## 2021-11-16 RX ADMIN — Medication 1 APPLICATION(S): at 12:00

## 2021-11-16 RX ADMIN — WARFARIN SODIUM 5 MILLIGRAM(S): 2.5 TABLET ORAL at 21:33

## 2021-11-16 RX ADMIN — LEVETIRACETAM 750 MILLIGRAM(S): 250 TABLET, FILM COATED ORAL at 05:40

## 2021-11-16 RX ADMIN — Medication 1 APPLICATION(S): at 05:41

## 2021-11-16 RX ADMIN — MUPIROCIN 1 APPLICATION(S): 20 OINTMENT TOPICAL at 16:44

## 2021-11-16 RX ADMIN — Medication 500 MILLIGRAM(S): at 12:00

## 2021-11-16 RX ADMIN — CHLORHEXIDINE GLUCONATE 1 APPLICATION(S): 213 SOLUTION TOPICAL at 12:00

## 2021-11-16 RX ADMIN — Medication 1 APPLICATION(S): at 18:17
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
## 2021-11-16 NOTE — PROGRESS NOTE ADULT - PROBLEM SELECTOR PLAN 2
resolved septic shock 2/2 ESBL Bacteremia   abx completed (Meropenem)
resolved septic shock 2/2 ESBL Bacteremia   abx completed (Meropenem)

## 2021-11-16 NOTE — PROGRESS NOTE ADULT - REASON FOR ADMISSION
Transfer for EEG

## 2021-11-16 NOTE — PROGRESS NOTE ADULT - NUTRITIONAL ASSESSMENT
This patient has been assessed with a concern for Malnutrition and has been determined to have a diagnosis/diagnoses of Moderate protein-calorie malnutrition.    This patient is being managed with:   Diet Easy to Chew-  Supplement Feeding Modality:  Oral  Ensure Enlive Cans or Servings Per Day:  1       Frequency:  Three Times a day  Entered: Nov 14 2021  8:33AM      This patient has been assessed with a concern for Malnutrition and has been determined to have a diagnosis/diagnoses of Moderate protein-calorie malnutrition.    This patient is being managed with:   Diet Easy to Chew-  Supplement Feeding Modality:  Oral  Ensure Enlive Cans or Servings Per Day:  1       Frequency:  Three Times a day  Entered: Nov 14 2021  8:33AM    
This patient has been assessed with a concern for Malnutrition and has been determined to have a diagnosis/diagnoses of Moderate protein-calorie malnutrition.    This patient is being managed with:   Diet Easy to Chew-  Supplement Feeding Modality:  Oral  Ensure Enlive Cans or Servings Per Day:  1       Frequency:  Three Times a day  Entered: Nov 14 2021  8:33AM

## 2021-11-16 NOTE — PROGRESS NOTE ADULT - PROBLEM SELECTOR PLAN 4
Functional paraplegia / Decubitus ulcers   - s/p C-spine fixation and wheel chair bound at baseline  - Wound care care for right butt deep tunneling decubitus ulcers   - Supportive care including frequent off loading
Functional paraplegia / Decubitus ulcers   - s/p C-spine fixation and wheel chair bound at baseline  - Wound care care for right butt deep tunneling decubitus ulcers   - Supportive care including frequent off loading

## 2021-11-16 NOTE — PROGRESS NOTE ADULT - PROBLEM SELECTOR PLAN 5
d/c planning to SNF  at baseline MS, delirium resolved   Patient is full code
d/c planning to SNF  unclear baseline MS, delirium still present  Patient is full code

## 2021-11-16 NOTE — PROGRESS NOTE ADULT - PROVIDER SPECIALTY LIST ADULT
Hospitalist
Hospitalist
Infectious Disease
Surgery
Hospitalist
Infectious Disease
Surgery
Epilepsy
Hospitalist
Infectious Disease
Neurology
Surgery
Surgery
Hospitalist
Infectious Disease
Palliative Care
Epilepsy
Epilepsy
Internal Medicine
Internal Medicine

## 2021-11-16 NOTE — PROGRESS NOTE ADULT - PROBLEM SELECTOR PLAN 3
DVT, complicated by right thigh hematoma  Continue  coumadin   dose for INR 2-3  monitor CBC
DVT, complicated by right thigh hematoma  Continue  coumadin   dose for INR 2-3  monitor CBC

## 2021-11-16 NOTE — PROGRESS NOTE ADULT - PROBLEM SELECTOR PLAN 1
Acute metabolic encephalopathy / seizure disorder / TBI /   - resloving delirium, likely hospitalization related  -  CTH  negative   - Initial cv EEG report reviewed and noted to moderate nonspecific diffuse/multifocal cerebral dysfunction but no epileptiform pattern or seizure seen which is significantly improved compared to prior study as per epileptologist evaluation   - Continue Keppra   - Aspiration precautions   - Seizure precautions  - advance to soft diet
Acute metabolic encephalopathy / seizure disorder / TBI /   - Delirium is present  . Etiology is unclear, likely hospitalization related  -  CTH  negative   - Initial cv EEG report reviewed and noted to moderate nonspecific diffuse/multifocal cerebral dysfunction but no epileptiform pattern or seizure seen which is significantly improved compared to prior study as per epileptologist evaluation   - Continue Keppra   - Aspiration precautions   - Seizure precautions  - advance to soft diet

## 2021-11-16 NOTE — PROGRESS NOTE ADULT - SUBJECTIVE AND OBJECTIVE BOX
Patient is a 46y old  Female who presents with a chief complaint of Transfer for EEG (15 Nov 2021 11:55)      SUBJECTIVE / OVERNIGHT EVENTS: much more awake today, no complaints.  Denies fevers, chills, sweats, chest pain or palpitations    MEDICATIONS  (STANDING):  ascorbic acid 500 milliGRAM(s) Oral daily  chlorhexidine 4% Liquid 1 Application(s) Topical <User Schedule>  ferrous    sulfate 325 milliGRAM(s) Oral two times a day  folic acid 1 milliGRAM(s) Oral daily  levETIRAcetam 750 milliGRAM(s) Oral two times a day  multivitamin 1 Tablet(s) Oral daily  mupirocin 2% Nasal 1 Application(s) Nasal every 12 hours  pantoprazole   Suspension 40 milliGRAM(s) Oral before breakfast  PARoxetine 30 milliGRAM(s) Oral daily  petrolatum Ophthalmic Ointment 1 Application(s) Both EYES every 6 hours  polyethylene glycol 3350 17 Gram(s) Oral at bedtime  senna 2 Tablet(s) Oral at bedtime    MEDICATIONS  (PRN):      T(C): 36.8 (11-16-21 @ 08:00), Max: 38.2 (11-15-21 @ 14:18)  HR: 98 (11-16-21 @ 08:00) (71 - 145)  BP: 134/69 (11-16-21 @ 08:00) (111/59 - 136/72)  RR: 16 (11-16-21 @ 08:00) (16 - 20)  SpO2: 97% (11-16-21 @ 08:00) (97% - 100%)  CAPILLARY BLOOD GLUCOSE        I&O's Summary    15 Nov 2021 07:01  -  16 Nov 2021 07:00  --------------------------------------------------------  IN: 720 mL / OUT: 1490 mL / NET: -770 mL        PHYSICAL EXAM:  GENERAL: NAD  EYES: EOMI, PERRLA, conjunctiva and sclera clear  NECK: Supple, No JVD  CHEST/LUNG: Clear to auscultation bilaterally; No wheeze  HEART: Regular rate and rhythm; No murmurs, rubs, or gallops  ABDOMEN: Soft, Nontender, Nondistended; Bowel sounds present  EXTREMITIES:  2+ Peripheral Pulses, No clubbing, cyanosis, or edema, right thigh slightly larger than left  PSYCH: AAOx3      LABS:                        10.0   10.76 )-----------( 649      ( 16 Nov 2021 07:02 )             33.5     11-16    144  |  109<H>  |  19.6  ----------------------------<  103<H>  3.8   |  24.0  |  0.60    Ca    8.5<L>      16 Nov 2021 07:02      PT/INR - ( 16 Nov 2021 07:02 )   PT: 19.9 sec;   INR: 1.76 ratio                   RADIOLOGY & ADDITIONAL TESTS:    Imaging Personally Reviewed:    Consultant(s) Notes Reviewed:      Care Discussed with Consultants/Other Providers:

## 2021-11-17 ENCOUNTER — TRANSCRIPTION ENCOUNTER (OUTPATIENT)
Age: 46
End: 2021-11-17

## 2021-11-17 VITALS
SYSTOLIC BLOOD PRESSURE: 115 MMHG | OXYGEN SATURATION: 98 % | RESPIRATION RATE: 17 BRPM | HEART RATE: 117 BPM | TEMPERATURE: 99 F | DIASTOLIC BLOOD PRESSURE: 75 MMHG

## 2021-11-17 LAB
ANION GAP SERPL CALC-SCNC: 11 MMOL/L — SIGNIFICANT CHANGE UP (ref 5–17)
BUN SERPL-MCNC: 18.4 MG/DL — SIGNIFICANT CHANGE UP (ref 8–20)
CALCIUM SERPL-MCNC: 8.7 MG/DL — SIGNIFICANT CHANGE UP (ref 8.6–10.2)
CHLORIDE SERPL-SCNC: 102 MMOL/L — SIGNIFICANT CHANGE UP (ref 98–107)
CO2 SERPL-SCNC: 25 MMOL/L — SIGNIFICANT CHANGE UP (ref 22–29)
CREAT SERPL-MCNC: 0.52 MG/DL — SIGNIFICANT CHANGE UP (ref 0.5–1.3)
GLUCOSE SERPL-MCNC: 101 MG/DL — HIGH (ref 70–99)
HCT VFR BLD CALC: 31.6 % — LOW (ref 34.5–45)
HGB BLD-MCNC: 9.3 G/DL — LOW (ref 11.5–15.5)
INR BLD: 2.15 RATIO — HIGH (ref 0.88–1.16)
MCHC RBC-ENTMCNC: 27.8 PG — SIGNIFICANT CHANGE UP (ref 27–34)
MCHC RBC-ENTMCNC: 29.4 GM/DL — LOW (ref 32–36)
MCV RBC AUTO: 94.6 FL — SIGNIFICANT CHANGE UP (ref 80–100)
PLATELET # BLD AUTO: 467 K/UL — HIGH (ref 150–400)
POTASSIUM SERPL-MCNC: 3.9 MMOL/L — SIGNIFICANT CHANGE UP (ref 3.5–5.3)
POTASSIUM SERPL-SCNC: 3.9 MMOL/L — SIGNIFICANT CHANGE UP (ref 3.5–5.3)
PROTHROM AB SERPL-ACNC: 24.1 SEC — HIGH (ref 10.6–13.6)
RBC # BLD: 3.34 M/UL — LOW (ref 3.8–5.2)
RBC # FLD: 18.4 % — HIGH (ref 10.3–14.5)
SODIUM SERPL-SCNC: 138 MMOL/L — SIGNIFICANT CHANGE UP (ref 135–145)
WBC # BLD: 10.81 K/UL — HIGH (ref 3.8–10.5)
WBC # FLD AUTO: 10.81 K/UL — HIGH (ref 3.8–10.5)

## 2021-11-17 PROCEDURE — 99239 HOSP IP/OBS DSCHRG MGMT >30: CPT

## 2021-11-17 RX ORDER — DOCUSATE SODIUM 100 MG
2 CAPSULE ORAL
Qty: 0 | Refills: 0 | DISCHARGE

## 2021-11-17 RX ORDER — WARFARIN SODIUM 2.5 MG/1
1 TABLET ORAL
Qty: 0 | Refills: 0 | DISCHARGE

## 2021-11-17 RX ORDER — GABAPENTIN 400 MG/1
1 CAPSULE ORAL
Qty: 0 | Refills: 0 | DISCHARGE

## 2021-11-17 RX ORDER — METHOCARBAMOL 500 MG/1
2 TABLET, FILM COATED ORAL
Qty: 0 | Refills: 0 | DISCHARGE

## 2021-11-17 RX ORDER — WARFARIN SODIUM 2.5 MG/1
1 TABLET ORAL
Qty: 7 | Refills: 0
Start: 2021-11-17 | End: 2021-11-23

## 2021-11-17 RX ORDER — BACLOFEN 100 %
1 POWDER (GRAM) MISCELLANEOUS
Qty: 270 | Refills: 0
Start: 2021-11-17 | End: 2022-02-14

## 2021-11-17 RX ORDER — WARFARIN SODIUM 2.5 MG/1
0.5 TABLET ORAL
Qty: 0 | Refills: 0 | DISCHARGE

## 2021-11-17 RX ORDER — MAGNESIUM CHLORIDE
1 CRYSTALS MISCELLANEOUS
Qty: 0 | Refills: 0 | DISCHARGE

## 2021-11-17 RX ORDER — BACLOFEN 100 %
1 POWDER (GRAM) MISCELLANEOUS
Qty: 0 | Refills: 0 | DISCHARGE

## 2021-11-17 RX ORDER — OXYCODONE HYDROCHLORIDE 5 MG/1
1 TABLET ORAL
Qty: 0 | Refills: 0 | DISCHARGE

## 2021-11-17 RX ORDER — TIZANIDINE 4 MG/1
1.5 TABLET ORAL
Qty: 0 | Refills: 0 | DISCHARGE

## 2021-11-17 RX ORDER — LEVETIRACETAM 250 MG/1
1 TABLET, FILM COATED ORAL
Qty: 0 | Refills: 0 | DISCHARGE
Start: 2021-11-17

## 2021-11-17 RX ORDER — LEVETIRACETAM 250 MG/1
1 TABLET, FILM COATED ORAL
Qty: 0 | Refills: 0 | DISCHARGE

## 2021-11-17 RX ADMIN — LEVETIRACETAM 750 MILLIGRAM(S): 250 TABLET, FILM COATED ORAL at 05:25

## 2021-11-17 RX ADMIN — Medication 325 MILLIGRAM(S): at 05:25

## 2021-11-17 RX ADMIN — PANTOPRAZOLE SODIUM 40 MILLIGRAM(S): 20 TABLET, DELAYED RELEASE ORAL at 06:58

## 2021-11-17 RX ADMIN — Medication 1 TABLET(S): at 11:09

## 2021-11-17 RX ADMIN — CHLORHEXIDINE GLUCONATE 1 APPLICATION(S): 213 SOLUTION TOPICAL at 07:02

## 2021-11-17 RX ADMIN — Medication 30 MILLIGRAM(S): at 11:09

## 2021-11-17 RX ADMIN — Medication 500 MILLIGRAM(S): at 11:09

## 2021-11-17 RX ADMIN — Medication 1 MILLIGRAM(S): at 11:11

## 2021-11-17 RX ADMIN — Medication 1 APPLICATION(S): at 11:11

## 2021-11-17 RX ADMIN — MUPIROCIN 1 APPLICATION(S): 20 OINTMENT TOPICAL at 05:25

## 2021-11-17 NOTE — DISCHARGE NOTE PROVIDER - CARE PROVIDER_API CALL
PCP,   Phone: (   )    -  Fax: (   )    -  Follow Up Time: 1 week    Vilma Baugh)  EEGEpilepsy; Neurology  [address] [place] [address]  Phone: [phone]  Fax: [phone]  Follow Up Time: 1 week

## 2021-11-17 NOTE — DISCHARGE NOTE PROVIDER - NSDCMRMEDTOKEN_GEN_ALL_CORE_FT
acetaminophen 325 mg oral tablet: 2 tab(s) orally every 6 hours, As Needed  baclofen 20 mg oral tablet: 1 tab(s) orally 3 times a day at 0600, 1400, 2200  bisacodyl 10 mg rectal suppository: 1 suppository(ies) rectal once a day, As Needed  docusate sodium 100 mg oral capsule: 2 cap(s) orally once a day  famotidine 40 mg oral tablet: 1 tab(s) orally once a day  ferrous sulfate 325 mg (65 mg elemental iron) oral tablet: 1 tab(s) orally 2 times a day  folic acid 1 mg oral tablet: 1 tab(s) orally once a day  gabapentin 100 mg oral capsule: 1 cap(s) orally once a day (in the morning)  gabapentin 300 mg oral capsule: 1 cap(s) orally 2 times a day @ 1400, 2200  levETIRAcetam 500 mg oral tablet: 1 tab(s) orally 2 times a day  Mag-SR 64 mg oral tablet, extended release: 1 tab(s) orally once a day  methocarbamol 750 mg oral tablet: 2 tab(s) orally every 6 hours @ 0000, 0600, 1200, 1800  Multiple Vitamins oral tablet: 1 tab(s) orally once a day  oxyCODONE 10 mg oral tablet: 1 tab(s) orally every 4 hours @ 0000, 0400, 0800, 1200, 1600, 2000   PARoxetine 30 mg oral tablet: 1 tab(s) orally once a day  polyethylene glycol 3350 oral powder for reconstitution: 17 gram(s) orally once a day (at bedtime)  Senna 8.6 mg oral tablet: 2 tab(s) orally once a day (at bedtime)  tiZANidine 4 mg oral tablet: 1.5 tab(s) orally every 6 hours @ 0300, 0900, 1500, 2100  warfarin 1 mg oral tablet: 0.5 tab(s) orally once a day (at bedtime) with 1 tab of warfarin 7.5mg  warfarin 7.5 mg oral tablet: 1 tab(s) orally once a day (at bedtime) with 0.5 tab of warfarin 1mg   acetaminophen 325 mg oral tablet: 2 tab(s) orally every 6 hours, As Needed  bisacodyl 10 mg rectal suppository: 1 suppository(ies) rectal once a day, As Needed  famotidine 40 mg oral tablet: 1 tab(s) orally once a day  ferrous sulfate 325 mg (65 mg elemental iron) oral tablet: 1 tab(s) orally 2 times a day  folic acid 1 mg oral tablet: 1 tab(s) orally once a day  levETIRAcetam 750 mg oral tablet: 1 tab(s) orally 2 times a day  Multiple Vitamins oral tablet: 1 tab(s) orally once a day  PARoxetine 30 mg oral tablet: 1 tab(s) orally once a day  polyethylene glycol 3350 oral powder for reconstitution: 17 gram(s) orally once a day (at bedtime)  Senna 8.6 mg oral tablet: 2 tab(s) orally once a day (at bedtime)  warfarin 4 mg oral tablet: 1 tab(s) orally once a day (at bedtime) , please check inr in 2 days   acetaminophen 325 mg oral tablet: 2 tab(s) orally every 6 hours, As Needed  baclofen 20 mg oral tablet: 1 tab(s) orally 3 times a day, As Needed -for muscle spasm  at 0600, 1400, 2200   bisacodyl 10 mg rectal suppository: 1 suppository(ies) rectal once a day, As Needed  famotidine 40 mg oral tablet: 1 tab(s) orally once a day  ferrous sulfate 325 mg (65 mg elemental iron) oral tablet: 1 tab(s) orally 2 times a day  folic acid 1 mg oral tablet: 1 tab(s) orally once a day  levETIRAcetam 750 mg oral tablet: 1 tab(s) orally 2 times a day  Multiple Vitamins oral tablet: 1 tab(s) orally once a day  PARoxetine 30 mg oral tablet: 1 tab(s) orally once a day  polyethylene glycol 3350 oral powder for reconstitution: 17 gram(s) orally once a day (at bedtime)  Senna 8.6 mg oral tablet: 2 tab(s) orally once a day (at bedtime)  warfarin 4 mg oral tablet: 1 tab(s) orally once a day (at bedtime) , please check inr in 2 days
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
## 2021-11-17 NOTE — DISCHARGE NOTE PROVIDER - DETAILS OF MALNUTRITION DIAGNOSIS/DIAGNOSES
This patient has been assessed with a concern for Malnutrition and was treated during this hospitalization for the following Nutrition diagnosis/diagnoses:     -  11/15/2021: Moderate protein-calorie malnutrition

## 2021-11-17 NOTE — DISCHARGE NOTE NURSING/CASE MANAGEMENT/SOCIAL WORK - PATIENT PORTAL LINK FT
You can access the FollowMyHealth Patient Portal offered by [facility] by registering at the following website: [email or web address]. By joining [facility]’s FollowMyHealth portal, you will also be able to view your health information using other applications (apps) compatible with our system.

## 2021-11-17 NOTE — DISCHARGE NOTE PROVIDER - NSDCCPCAREPLAN_GEN_ALL_CORE_FT
PRINCIPAL DISCHARGE DIAGNOSIS  Diagnosis: Acute metabolic encephalopathy  Assessment and Plan of Treatment: - CTH  negative   - Initial cv EEG report reviewed and noted to moderate nonspecific diffuse/multifocal cerebral dysfunction but no epileptiform pattern or seizure seen which is significantly improved compared to prior study as per epileptologist evaluation   - Continue Keppra   - Aspiration precautions   - Seizure precautions  - Follow up epilepsy in 1 week      SECONDARY DISCHARGE DIAGNOSES  Diagnosis: Bacteremia  Assessment and Plan of Treatment: - Resolved   -  You recieved merrum for antibiotic coverage  - Follow up with PCP in 1 week    Diagnosis: DVT, lower extremity  Assessment and Plan of Treatment: - Continue coumadin   - Follow up with PCP in 1 week     PRINCIPAL DISCHARGE DIAGNOSIS  Diagnosis: Acute metabolic encephalopathy  Assessment and Plan of Treatment: - CTH  negative   - Initial cv EEG report reviewed and noted to moderate nonspecific diffuse/multifocal cerebral dysfunction but no epileptiform pattern or seizure seen which is significantly improved compared to prior study as per epileptologist evaluation   - Continue Keppra   - Aspiration precautions   - Seizure precautions  - Follow up epilepsy in 1 week      SECONDARY DISCHARGE DIAGNOSES  Diagnosis: DVT, lower extremity  Assessment and Plan of Treatment: - Continue coumadin   - Have INR checked in 2 days and adjust coumadin based on INR  - Follow up with PCP in 1 week    Diagnosis: Bacteremia  Assessment and Plan of Treatment: - Resolved   -  You recieved merrum for antibiotic coverage  - Follow up with PCP in 1 week    Diagnosis: Urinary retention  Assessment and Plan of Treatment: please preform trial of void in the next 48 hours thanks

## 2021-11-17 NOTE — DISCHARGE NOTE PROVIDER - PROVIDER TOKENS
FREE:[LAST:[PCP],PHONE:[(   )    -],FAX:[(   )    -],FOLLOWUP:[1 week]],PROVIDER:[TOKEN:[[id number]],FOLLOWUP:[1 week]]

## 2021-11-18 ENCOUNTER — OUTPATIENT (OUTPATIENT)
Dept: OUTPATIENT SERVICES | Facility: HOSPITAL | Age: 46
LOS: 1 days | End: 2021-11-18

## 2021-11-18 DIAGNOSIS — Z98.84 BARIATRIC SURGERY STATUS: Chronic | ICD-10-CM

## 2021-11-18 DIAGNOSIS — M54.2 CERVICALGIA: Chronic | ICD-10-CM
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
## 2021-11-22 ENCOUNTER — OUTPATIENT (OUTPATIENT)
Dept: OUTPATIENT SERVICES | Facility: HOSPITAL | Age: 46
LOS: 1 days | End: 2021-11-22

## 2021-11-22 DIAGNOSIS — M54.2 CERVICALGIA: Chronic | ICD-10-CM

## 2021-11-22 DIAGNOSIS — Z98.84 BARIATRIC SURGERY STATUS: Chronic | ICD-10-CM

## 2021-11-25 ENCOUNTER — OUTPATIENT (OUTPATIENT)
Dept: OUTPATIENT SERVICES | Facility: HOSPITAL | Age: 46
LOS: 1 days | End: 2021-11-25

## 2021-11-25 DIAGNOSIS — Z98.84 BARIATRIC SURGERY STATUS: Chronic | ICD-10-CM

## 2021-11-25 DIAGNOSIS — M54.2 CERVICALGIA: Chronic | ICD-10-CM

## 2021-11-26 ENCOUNTER — OUTPATIENT (OUTPATIENT)
Dept: OUTPATIENT SERVICES | Facility: HOSPITAL | Age: 46
LOS: 1 days | End: 2021-11-26
Payer: MEDICARE

## 2021-11-26 DIAGNOSIS — M54.2 CERVICALGIA: Chronic | ICD-10-CM

## 2021-11-26 DIAGNOSIS — Z98.84 BARIATRIC SURGERY STATUS: Chronic | ICD-10-CM

## 2021-11-26 PROCEDURE — 72192 CT PELVIS W/O DYE: CPT | Mod: 26

## 2021-12-02 ENCOUNTER — OUTPATIENT (OUTPATIENT)
Dept: OUTPATIENT SERVICES | Facility: HOSPITAL | Age: 46
LOS: 1 days | End: 2021-12-02

## 2021-12-02 DIAGNOSIS — Z98.84 BARIATRIC SURGERY STATUS: Chronic | ICD-10-CM

## 2021-12-02 DIAGNOSIS — M54.2 CERVICALGIA: Chronic | ICD-10-CM

## 2021-12-03 ENCOUNTER — EMERGENCY (EMERGENCY)
Facility: HOSPITAL | Age: 46
LOS: 1 days | End: 2021-12-03
Admitting: EMERGENCY MEDICINE
Payer: MEDICARE

## 2021-12-03 DIAGNOSIS — Z98.84 BARIATRIC SURGERY STATUS: Chronic | ICD-10-CM

## 2021-12-03 DIAGNOSIS — M54.2 CERVICALGIA: Chronic | ICD-10-CM

## 2021-12-03 PROCEDURE — 99284 EMERGENCY DEPT VISIT MOD MDM: CPT

## 2021-12-06 ENCOUNTER — OUTPATIENT (OUTPATIENT)
Dept: OUTPATIENT SERVICES | Facility: HOSPITAL | Age: 46
LOS: 1 days | End: 2021-12-06

## 2021-12-06 DIAGNOSIS — Z98.84 BARIATRIC SURGERY STATUS: Chronic | ICD-10-CM

## 2021-12-06 DIAGNOSIS — M54.2 CERVICALGIA: Chronic | ICD-10-CM

## 2021-12-11 ENCOUNTER — OUTPATIENT (OUTPATIENT)
Dept: OUTPATIENT SERVICES | Facility: HOSPITAL | Age: 46
LOS: 1 days | End: 2021-12-11

## 2021-12-11 DIAGNOSIS — M54.2 CERVICALGIA: Chronic | ICD-10-CM

## 2021-12-11 DIAGNOSIS — Z98.84 BARIATRIC SURGERY STATUS: Chronic | ICD-10-CM

## 2021-12-14 ENCOUNTER — INPATIENT (INPATIENT)
Facility: HOSPITAL | Age: 46
LOS: 15 days | Discharge: NURSING, REHAB OR LONG-TERM CARE | End: 2021-12-30
Admitting: INTERNAL MEDICINE
Payer: MEDICARE

## 2021-12-14 DIAGNOSIS — Z98.84 BARIATRIC SURGERY STATUS: Chronic | ICD-10-CM

## 2021-12-14 DIAGNOSIS — M54.2 CERVICALGIA: Chronic | ICD-10-CM

## 2021-12-14 PROCEDURE — 93010 ELECTROCARDIOGRAM REPORT: CPT

## 2021-12-14 PROCEDURE — 93970 EXTREMITY STUDY: CPT | Mod: 26

## 2021-12-14 PROCEDURE — 99291 CRITICAL CARE FIRST HOUR: CPT

## 2021-12-14 PROCEDURE — 71045 X-RAY EXAM CHEST 1 VIEW: CPT | Mod: 26

## 2021-12-15 PROCEDURE — 93306 TTE W/DOPPLER COMPLETE: CPT | Mod: 26

## 2021-12-15 PROCEDURE — 74177 CT ABD & PELVIS W/CONTRAST: CPT | Mod: 26

## 2021-12-16 ENCOUNTER — OUTPATIENT (OUTPATIENT)
Dept: OUTPATIENT SERVICES | Facility: HOSPITAL | Age: 46
LOS: 1 days | End: 2021-12-16

## 2021-12-16 DIAGNOSIS — M54.2 CERVICALGIA: Chronic | ICD-10-CM

## 2021-12-16 DIAGNOSIS — Z98.84 BARIATRIC SURGERY STATUS: Chronic | ICD-10-CM

## 2021-12-16 PROCEDURE — 99221 1ST HOSP IP/OBS SF/LOW 40: CPT

## 2021-12-17 ENCOUNTER — OUTPATIENT (OUTPATIENT)
Dept: OUTPATIENT SERVICES | Facility: HOSPITAL | Age: 46
LOS: 1 days | End: 2021-12-17

## 2021-12-17 DIAGNOSIS — M54.2 CERVICALGIA: Chronic | ICD-10-CM

## 2021-12-17 DIAGNOSIS — Z98.84 BARIATRIC SURGERY STATUS: Chronic | ICD-10-CM

## 2021-12-17 PROCEDURE — 99232 SBSQ HOSP IP/OBS MODERATE 35: CPT

## 2021-12-17 PROCEDURE — 97606 NEG PRS WND THER DME>50 SQCM: CPT

## 2021-12-18 ENCOUNTER — OUTPATIENT (OUTPATIENT)
Dept: OUTPATIENT SERVICES | Facility: HOSPITAL | Age: 46
LOS: 1 days | End: 2021-12-18

## 2021-12-18 DIAGNOSIS — M54.2 CERVICALGIA: Chronic | ICD-10-CM

## 2021-12-18 DIAGNOSIS — Z98.84 BARIATRIC SURGERY STATUS: Chronic | ICD-10-CM

## 2021-12-19 ENCOUNTER — OUTPATIENT (OUTPATIENT)
Dept: OUTPATIENT SERVICES | Facility: HOSPITAL | Age: 46
LOS: 1 days | End: 2021-12-19

## 2021-12-19 DIAGNOSIS — M54.2 CERVICALGIA: Chronic | ICD-10-CM

## 2021-12-19 DIAGNOSIS — Z98.84 BARIATRIC SURGERY STATUS: Chronic | ICD-10-CM

## 2021-12-19 PROCEDURE — 93010 ELECTROCARDIOGRAM REPORT: CPT

## 2021-12-19 PROCEDURE — 71045 X-RAY EXAM CHEST 1 VIEW: CPT | Mod: 26

## 2021-12-19 PROCEDURE — 70450 CT HEAD/BRAIN W/O DYE: CPT | Mod: 26

## 2021-12-20 PROCEDURE — 71045 X-RAY EXAM CHEST 1 VIEW: CPT | Mod: 26

## 2021-12-20 PROCEDURE — 99232 SBSQ HOSP IP/OBS MODERATE 35: CPT

## 2021-12-20 PROCEDURE — 70551 MRI BRAIN STEM W/O DYE: CPT | Mod: 26

## 2021-12-20 PROCEDURE — 97606 NEG PRS WND THER DME>50 SQCM: CPT

## 2021-12-22 ENCOUNTER — OUTPATIENT (OUTPATIENT)
Dept: OUTPATIENT SERVICES | Facility: HOSPITAL | Age: 46
LOS: 1 days | End: 2021-12-22

## 2021-12-22 DIAGNOSIS — Z98.84 BARIATRIC SURGERY STATUS: Chronic | ICD-10-CM

## 2021-12-22 DIAGNOSIS — M54.2 CERVICALGIA: Chronic | ICD-10-CM

## 2021-12-23 PROCEDURE — 99232 SBSQ HOSP IP/OBS MODERATE 35: CPT

## 2021-12-23 PROCEDURE — 97606 NEG PRS WND THER DME>50 SQCM: CPT

## 2021-12-24 ENCOUNTER — OUTPATIENT (OUTPATIENT)
Dept: OUTPATIENT SERVICES | Facility: HOSPITAL | Age: 46
LOS: 1 days | End: 2021-12-24

## 2021-12-24 DIAGNOSIS — Z98.84 BARIATRIC SURGERY STATUS: Chronic | ICD-10-CM

## 2021-12-24 DIAGNOSIS — M54.2 CERVICALGIA: Chronic | ICD-10-CM

## 2021-12-24 PROCEDURE — 93010 ELECTROCARDIOGRAM REPORT: CPT

## 2021-12-25 ENCOUNTER — OUTPATIENT (OUTPATIENT)
Dept: OUTPATIENT SERVICES | Facility: HOSPITAL | Age: 46
LOS: 1 days | End: 2021-12-25

## 2021-12-25 DIAGNOSIS — M54.2 CERVICALGIA: Chronic | ICD-10-CM

## 2021-12-25 DIAGNOSIS — Z98.84 BARIATRIC SURGERY STATUS: Chronic | ICD-10-CM

## 2021-12-26 ENCOUNTER — OUTPATIENT (OUTPATIENT)
Dept: OUTPATIENT SERVICES | Facility: HOSPITAL | Age: 46
LOS: 1 days | End: 2021-12-26

## 2021-12-26 DIAGNOSIS — M54.2 CERVICALGIA: Chronic | ICD-10-CM

## 2021-12-26 DIAGNOSIS — Z98.84 BARIATRIC SURGERY STATUS: Chronic | ICD-10-CM

## 2021-12-27 ENCOUNTER — OUTPATIENT (OUTPATIENT)
Dept: OUTPATIENT SERVICES | Facility: HOSPITAL | Age: 46
LOS: 1 days | End: 2021-12-27

## 2021-12-27 DIAGNOSIS — M54.2 CERVICALGIA: Chronic | ICD-10-CM

## 2021-12-27 DIAGNOSIS — Z98.84 BARIATRIC SURGERY STATUS: Chronic | ICD-10-CM

## 2021-12-28 ENCOUNTER — OUTPATIENT (OUTPATIENT)
Dept: OUTPATIENT SERVICES | Facility: HOSPITAL | Age: 46
LOS: 1 days | End: 2021-12-28

## 2021-12-28 DIAGNOSIS — Z98.84 BARIATRIC SURGERY STATUS: Chronic | ICD-10-CM

## 2021-12-28 DIAGNOSIS — M54.2 CERVICALGIA: Chronic | ICD-10-CM

## 2021-12-29 ENCOUNTER — OUTPATIENT (OUTPATIENT)
Dept: OUTPATIENT SERVICES | Facility: HOSPITAL | Age: 46
LOS: 1 days | End: 2021-12-29

## 2021-12-29 DIAGNOSIS — M54.2 CERVICALGIA: Chronic | ICD-10-CM

## 2021-12-29 DIAGNOSIS — Z98.84 BARIATRIC SURGERY STATUS: Chronic | ICD-10-CM

## 2021-12-30 ENCOUNTER — OUTPATIENT (OUTPATIENT)
Dept: OUTPATIENT SERVICES | Facility: HOSPITAL | Age: 46
LOS: 1 days | End: 2021-12-30

## 2021-12-30 DIAGNOSIS — M54.2 CERVICALGIA: Chronic | ICD-10-CM

## 2021-12-30 DIAGNOSIS — Z98.84 BARIATRIC SURGERY STATUS: Chronic | ICD-10-CM

## 2021-12-30 PROCEDURE — 85730 THROMBOPLASTIN TIME PARTIAL: CPT

## 2021-12-30 PROCEDURE — 85018 HEMOGLOBIN: CPT

## 2021-12-30 PROCEDURE — 86850 RBC ANTIBODY SCREEN: CPT

## 2021-12-30 PROCEDURE — 89051 BODY FLUID CELL COUNT: CPT

## 2021-12-30 PROCEDURE — 70450 CT HEAD/BRAIN W/O DYE: CPT

## 2021-12-30 PROCEDURE — 84145 PROCALCITONIN (PCT): CPT

## 2021-12-30 PROCEDURE — 87529 HSV DNA AMP PROBE: CPT

## 2021-12-30 PROCEDURE — 93971 EXTREMITY STUDY: CPT

## 2021-12-30 PROCEDURE — 85014 HEMATOCRIT: CPT

## 2021-12-30 PROCEDURE — 87483 CNS DNA AMP PROBE TYPE 12-25: CPT

## 2021-12-30 PROCEDURE — 73701 CT LOWER EXTREMITY W/DYE: CPT

## 2021-12-30 PROCEDURE — 0225U NFCT DS DNA&RNA 21 SARSCOV2: CPT

## 2021-12-30 PROCEDURE — 93970 EXTREMITY STUDY: CPT

## 2021-12-30 PROCEDURE — P9040: CPT

## 2021-12-30 PROCEDURE — 83735 ASSAY OF MAGNESIUM: CPT

## 2021-12-30 PROCEDURE — 83605 ASSAY OF LACTIC ACID: CPT

## 2021-12-30 PROCEDURE — 36430 TRANSFUSION BLD/BLD COMPNT: CPT

## 2021-12-30 PROCEDURE — 86923 COMPATIBILITY TEST ELECTRIC: CPT

## 2021-12-30 PROCEDURE — U0003: CPT

## 2021-12-30 PROCEDURE — 95711 VEEG 2-12 HR UNMONITORED: CPT

## 2021-12-30 PROCEDURE — 86769 SARS-COV-2 COVID-19 ANTIBODY: CPT

## 2021-12-30 PROCEDURE — 36415 COLL VENOUS BLD VENIPUNCTURE: CPT

## 2021-12-30 PROCEDURE — 85610 PROTHROMBIN TIME: CPT

## 2021-12-30 PROCEDURE — 95700 EEG CONT REC W/VID EEG TECH: CPT

## 2021-12-30 PROCEDURE — U0005: CPT

## 2021-12-30 PROCEDURE — 85027 COMPLETE CBC AUTOMATED: CPT

## 2021-12-30 PROCEDURE — 92610 EVALUATE SWALLOWING FUNCTION: CPT

## 2021-12-30 PROCEDURE — 86900 BLOOD TYPING SEROLOGIC ABO: CPT

## 2021-12-30 PROCEDURE — 86901 BLOOD TYPING SEROLOGIC RH(D): CPT

## 2021-12-30 PROCEDURE — 87040 BLOOD CULTURE FOR BACTERIA: CPT

## 2021-12-30 PROCEDURE — 80053 COMPREHEN METABOLIC PANEL: CPT

## 2021-12-30 PROCEDURE — 84100 ASSAY OF PHOSPHORUS: CPT

## 2021-12-30 PROCEDURE — 85025 COMPLETE CBC W/AUTO DIFF WBC: CPT

## 2021-12-30 PROCEDURE — 95714 VEEG EA 12-26 HR UNMNTR: CPT

## 2021-12-30 PROCEDURE — 93005 ELECTROCARDIOGRAM TRACING: CPT

## 2021-12-30 PROCEDURE — 81001 URINALYSIS AUTO W/SCOPE: CPT

## 2021-12-30 PROCEDURE — 80048 BASIC METABOLIC PNL TOTAL CA: CPT

## 2021-12-30 PROCEDURE — 80164 ASSAY DIPROPYLACETIC ACD TOT: CPT

## 2021-12-30 PROCEDURE — P9016: CPT

## 2021-12-31 ENCOUNTER — OUTPATIENT (OUTPATIENT)
Dept: OUTPATIENT SERVICES | Facility: HOSPITAL | Age: 46
LOS: 1 days | End: 2021-12-31

## 2021-12-31 DIAGNOSIS — Z98.84 BARIATRIC SURGERY STATUS: Chronic | ICD-10-CM

## 2021-12-31 DIAGNOSIS — M54.2 CERVICALGIA: Chronic | ICD-10-CM

## 2022-01-01 ENCOUNTER — OUTPATIENT (OUTPATIENT)
Dept: OUTPATIENT SERVICES | Facility: HOSPITAL | Age: 47
LOS: 1 days | End: 2022-01-01

## 2022-01-01 DIAGNOSIS — Z98.84 BARIATRIC SURGERY STATUS: Chronic | ICD-10-CM

## 2022-01-01 DIAGNOSIS — M54.2 CERVICALGIA: Chronic | ICD-10-CM

## 2022-01-08 ENCOUNTER — OUTPATIENT (OUTPATIENT)
Dept: OUTPATIENT SERVICES | Facility: HOSPITAL | Age: 47
LOS: 1 days | End: 2022-01-08

## 2022-01-08 DIAGNOSIS — Z98.84 BARIATRIC SURGERY STATUS: Chronic | ICD-10-CM

## 2022-01-08 DIAGNOSIS — M54.2 CERVICALGIA: Chronic | ICD-10-CM

## 2022-01-12 ENCOUNTER — APPOINTMENT (OUTPATIENT)
Dept: PLASTIC SURGERY | Facility: CLINIC | Age: 47
End: 2022-01-12
Payer: MEDICARE

## 2022-01-12 PROCEDURE — 99213 OFFICE O/P EST LOW 20 MIN: CPT | Mod: 95

## 2022-01-12 NOTE — ASSESSMENT
[FreeTextEntry1] : Right thigh wound.\par Per pt's nurse at [facility], the VAC is being changed every Tuesday and Saturday.\par I would continue the VAC.\par Follow up in two weeks.\par If the wound continues to improve, we may be able to stop the VAC at that time.\par This information was communicated to the pt and [facility] staff.

## 2022-01-12 NOTE — HISTORY OF PRESENT ILLNESS
[Other Location: e.g. School (Enter Location, City,State)___] : at [unfilled], at the time of the visit. [Medical Office: ([facility])___] : at the medical office located in  [Verbal consent obtained from patient] : the patient, [unfilled] [Formal Caregiver] : formal caregiver [Other:____] : [unfilled] [FreeTextEntry1] : Pt reports she is feeling well and is pleased with her progress.\par The telehealth visit was conducted with pt's nurse Lexi changing the dressing on the video for me to see the wound.\par \par This is a 47 yo woman who I have seen several time in [facility] for a right thigh wound.\par This seemed to have developed as an infected hematoma that required OR debridement by general surgery. She was septic, and in the ICU at the time. She was also intubated for a seizure during that admission.\par The wound was treated with a wound VAC.\par It initially extended from the medial right thigh to the muscle and wrapped around the posterior thigh in a degloving fashion. At that time it was large enough to admit an entire hand.

## 2022-01-12 NOTE — PHYSICAL EXAM
[de-identified] : Lying in a hospital bed.\par Awake and alert and conversant. [de-identified] : NL respiratory effort noted  [de-identified] : Right upper thigh wound:\par It is 4 inches in length, from proximal to distal (based on the width of a 4x4 gauze), approximately 2 cm in width and 3-4 cm in depth.\par The nurse was not able to insert her fingers beyond that and it appears that there is no further undermining.\par The surrounding skin is no longer macerated.\par There is no erythema and the tissues appear soft.

## 2022-01-26 ENCOUNTER — APPOINTMENT (OUTPATIENT)
Dept: PLASTIC SURGERY | Facility: CLINIC | Age: 47
End: 2022-01-26
Payer: MEDICARE

## 2022-01-26 PROCEDURE — 99213 OFFICE O/P EST LOW 20 MIN: CPT | Mod: 95

## 2022-01-26 NOTE — HISTORY OF PRESENT ILLNESS
[Other Location: e.g. School (Enter Location, City,State)___] : at [unfilled], at the time of the visit. [Medical Office: ([facility])___] : at the medical office located in  [Other:____] : [unfilled] [Verbal consent obtained from patient] : the patient, [unfilled] [FreeTextEntry1] : Pt is being seen for her right proximal thigh wound.\par Pt reports she is feeling great.\par \par The nurse reports they have been using the vac, but there were some issues last night so it was removed.

## 2022-01-26 NOTE — ASSESSMENT
[FreeTextEntry1] : Significant improvement in the size and depth of the thigh wound.\par Because there is still some depth to the wound, I would recommend continuing the VAC for two more weeks.\par I recommend a framing technique, using VAC drape all around the wound to protect the skin and improve the seal. This was described directly to the nurse.\par Follow up with telehealth in two weeks.\par Pt reports she was happy with that.

## 2022-01-26 NOTE — PHYSICAL EXAM
[NI] : Normal [de-identified] : Pt lying in hospital bed.\par Alert and oriented. \par Voice is loud and strong.\par Appears in very good spirits. [de-identified] : Right upper medial thigh wound is much smaller.\par The proximal aspect is closed with a linear scar.\par The wound itself is about 2 cm wide and 2 cm deep. About 8cm long.\par No undermining.\par Base with healthy granulation tissue.\par Surrounding skin is intact with no maceration.

## 2022-01-27 DIAGNOSIS — R78.81 BACTEREMIA: ICD-10-CM

## 2022-01-27 DIAGNOSIS — K92.2 GASTROINTESTINAL HEMORRHAGE, UNSPECIFIED: ICD-10-CM

## 2022-01-27 DIAGNOSIS — M72.6 NECROTIZING FASCIITIS: ICD-10-CM

## 2022-02-02 ENCOUNTER — OUTPATIENT (OUTPATIENT)
Dept: OUTPATIENT SERVICES | Facility: HOSPITAL | Age: 47
LOS: 1 days | End: 2022-02-02

## 2022-02-02 DIAGNOSIS — I82.409 ACUTE EMBOLISM AND THROMBOSIS OF UNSPECIFIED DEEP VEINS OF UNSPECIFIED LOWER EXTREMITY: ICD-10-CM

## 2022-02-02 DIAGNOSIS — Z98.84 BARIATRIC SURGERY STATUS: Chronic | ICD-10-CM

## 2022-02-02 DIAGNOSIS — M54.2 CERVICALGIA: Chronic | ICD-10-CM

## 2022-02-04 ENCOUNTER — OUTPATIENT (OUTPATIENT)
Dept: OUTPATIENT SERVICES | Facility: HOSPITAL | Age: 47
LOS: 1 days | End: 2022-02-04

## 2022-02-04 DIAGNOSIS — D64.9 ANEMIA, UNSPECIFIED: ICD-10-CM

## 2022-02-04 DIAGNOSIS — M54.2 CERVICALGIA: Chronic | ICD-10-CM

## 2022-02-04 DIAGNOSIS — Z98.84 BARIATRIC SURGERY STATUS: Chronic | ICD-10-CM

## 2022-02-05 ENCOUNTER — OUTPATIENT (OUTPATIENT)
Dept: OUTPATIENT SERVICES | Facility: HOSPITAL | Age: 47
LOS: 1 days | End: 2022-02-05

## 2022-02-05 DIAGNOSIS — I82.402 ACUTE EMBOLISM AND THROMBOSIS OF UNSPECIFIED DEEP VEINS OF LEFT LOWER EXTREMITY: ICD-10-CM

## 2022-02-05 DIAGNOSIS — Z98.84 BARIATRIC SURGERY STATUS: Chronic | ICD-10-CM

## 2022-02-05 DIAGNOSIS — M54.2 CERVICALGIA: Chronic | ICD-10-CM

## 2022-02-07 ENCOUNTER — OUTPATIENT (OUTPATIENT)
Dept: OUTPATIENT SERVICES | Facility: HOSPITAL | Age: 47
LOS: 1 days | End: 2022-02-07

## 2022-02-07 DIAGNOSIS — I82.409 ACUTE EMBOLISM AND THROMBOSIS OF UNSPECIFIED DEEP VEINS OF UNSPECIFIED LOWER EXTREMITY: ICD-10-CM

## 2022-02-07 DIAGNOSIS — M54.2 CERVICALGIA: Chronic | ICD-10-CM

## 2022-02-07 DIAGNOSIS — R78.81 BACTEREMIA: ICD-10-CM

## 2022-02-07 DIAGNOSIS — Z98.84 BARIATRIC SURGERY STATUS: Chronic | ICD-10-CM

## 2022-02-07 DIAGNOSIS — M72.6 NECROTIZING FASCIITIS: ICD-10-CM

## 2022-02-07 DIAGNOSIS — K92.2 GASTROINTESTINAL HEMORRHAGE, UNSPECIFIED: ICD-10-CM

## 2022-02-08 ENCOUNTER — OUTPATIENT (OUTPATIENT)
Dept: OUTPATIENT SERVICES | Facility: HOSPITAL | Age: 47
LOS: 1 days | End: 2022-02-08

## 2022-02-08 ENCOUNTER — APPOINTMENT (OUTPATIENT)
Dept: PLASTIC SURGERY | Facility: CLINIC | Age: 47
End: 2022-02-08
Payer: MEDICARE

## 2022-02-08 DIAGNOSIS — M54.2 CERVICALGIA: Chronic | ICD-10-CM

## 2022-02-08 DIAGNOSIS — Z98.84 BARIATRIC SURGERY STATUS: Chronic | ICD-10-CM

## 2022-02-08 DIAGNOSIS — D64.9 ANEMIA, UNSPECIFIED: ICD-10-CM

## 2022-02-08 PROCEDURE — 99212 OFFICE O/P EST SF 10 MIN: CPT | Mod: 95

## 2022-02-08 NOTE — PHYSICAL EXAM
[NI] : Normal [de-identified] : Pt is awake and alert and responsive, lying in a hospital bed. [de-identified] : Right thigh wound is healing very well.\par It has decreased to about 1 x 8 cm with some maceration centrally, but no real open wound left.\par Surrounding skin is clean and healed.

## 2022-02-08 NOTE — ASSESSMENT
[FreeTextEntry1] : Doing very well\par DC VAC.\par Start topical wound care with wound gel or antibiotic ointment.\par OK for local wound care physician to resume care if desired.\par Otherwise, follow up in two weeks and the wound may be healed by then.

## 2022-02-08 NOTE — HISTORY OF PRESENT ILLNESS
[Other Location: e.g. School (Enter Location, City,State)___] : at [unfilled], at the time of the visit. [Formal Caregiver] : formal caregiver [Verbal consent obtained from patient] : the patient, [unfilled] [FreeTextEntry1] : Pt reports she is doing well.\par Nurse Lexi reports the VAC came off and there was not enough of an opening to even get it back on.\par

## 2022-02-10 ENCOUNTER — OUTPATIENT (OUTPATIENT)
Dept: OUTPATIENT SERVICES | Facility: HOSPITAL | Age: 47
LOS: 1 days | End: 2022-02-10

## 2022-02-10 DIAGNOSIS — I82.509 CHRONIC EMBOLISM AND THROMBOSIS OF UNSPECIFIED DEEP VEINS OF UNSPECIFIED LOWER EXTREMITY: ICD-10-CM

## 2022-02-10 DIAGNOSIS — M54.2 CERVICALGIA: Chronic | ICD-10-CM

## 2022-02-10 DIAGNOSIS — Z98.84 BARIATRIC SURGERY STATUS: Chronic | ICD-10-CM

## 2022-02-11 ENCOUNTER — OUTPATIENT (OUTPATIENT)
Dept: OUTPATIENT SERVICES | Facility: HOSPITAL | Age: 47
LOS: 1 days | End: 2022-02-11

## 2022-02-11 DIAGNOSIS — D64.9 ANEMIA, UNSPECIFIED: ICD-10-CM

## 2022-02-11 DIAGNOSIS — Z98.84 BARIATRIC SURGERY STATUS: Chronic | ICD-10-CM

## 2022-02-11 DIAGNOSIS — M54.2 CERVICALGIA: Chronic | ICD-10-CM

## 2022-02-12 ENCOUNTER — OUTPATIENT (OUTPATIENT)
Dept: OUTPATIENT SERVICES | Facility: HOSPITAL | Age: 47
LOS: 1 days | End: 2022-02-12

## 2022-02-12 DIAGNOSIS — Z98.84 BARIATRIC SURGERY STATUS: Chronic | ICD-10-CM

## 2022-02-12 DIAGNOSIS — M54.2 CERVICALGIA: Chronic | ICD-10-CM

## 2022-02-12 DIAGNOSIS — I48.91 UNSPECIFIED ATRIAL FIBRILLATION: ICD-10-CM

## 2022-02-14 ENCOUNTER — OUTPATIENT (OUTPATIENT)
Dept: OUTPATIENT SERVICES | Facility: HOSPITAL | Age: 47
LOS: 1 days | End: 2022-02-14

## 2022-02-14 DIAGNOSIS — Z98.84 BARIATRIC SURGERY STATUS: Chronic | ICD-10-CM

## 2022-02-14 DIAGNOSIS — M54.2 CERVICALGIA: Chronic | ICD-10-CM

## 2022-02-14 DIAGNOSIS — I48.91 UNSPECIFIED ATRIAL FIBRILLATION: ICD-10-CM

## 2022-02-14 DIAGNOSIS — Z00.00 ENCOUNTER FOR GENERAL ADULT MEDICAL EXAMINATION WITHOUT ABNORMAL FINDINGS: ICD-10-CM

## 2022-02-15 ENCOUNTER — OUTPATIENT (OUTPATIENT)
Dept: OUTPATIENT SERVICES | Facility: HOSPITAL | Age: 47
LOS: 1 days | End: 2022-02-15

## 2022-02-15 DIAGNOSIS — M54.2 CERVICALGIA: Chronic | ICD-10-CM

## 2022-02-15 DIAGNOSIS — I82.509 CHRONIC EMBOLISM AND THROMBOSIS OF UNSPECIFIED DEEP VEINS OF UNSPECIFIED LOWER EXTREMITY: ICD-10-CM

## 2022-02-15 DIAGNOSIS — Z98.84 BARIATRIC SURGERY STATUS: Chronic | ICD-10-CM

## 2022-02-17 ENCOUNTER — OUTPATIENT (OUTPATIENT)
Dept: OUTPATIENT SERVICES | Facility: HOSPITAL | Age: 47
LOS: 1 days | End: 2022-02-17

## 2022-02-17 DIAGNOSIS — D64.9 ANEMIA, UNSPECIFIED: ICD-10-CM

## 2022-02-17 DIAGNOSIS — M54.2 CERVICALGIA: Chronic | ICD-10-CM

## 2022-02-17 DIAGNOSIS — Z98.84 BARIATRIC SURGERY STATUS: Chronic | ICD-10-CM

## 2022-02-22 ENCOUNTER — OUTPATIENT (OUTPATIENT)
Dept: OUTPATIENT SERVICES | Facility: HOSPITAL | Age: 47
LOS: 1 days | End: 2022-02-22

## 2022-02-22 DIAGNOSIS — Z98.84 BARIATRIC SURGERY STATUS: Chronic | ICD-10-CM

## 2022-02-22 DIAGNOSIS — I48.91 UNSPECIFIED ATRIAL FIBRILLATION: ICD-10-CM

## 2022-02-22 DIAGNOSIS — M54.2 CERVICALGIA: Chronic | ICD-10-CM

## 2022-02-24 ENCOUNTER — OUTPATIENT (OUTPATIENT)
Dept: OUTPATIENT SERVICES | Facility: HOSPITAL | Age: 47
LOS: 1 days | End: 2022-02-24

## 2022-02-24 DIAGNOSIS — Z98.84 BARIATRIC SURGERY STATUS: Chronic | ICD-10-CM

## 2022-02-24 DIAGNOSIS — Z00.00 ENCOUNTER FOR GENERAL ADULT MEDICAL EXAMINATION WITHOUT ABNORMAL FINDINGS: ICD-10-CM

## 2022-02-24 DIAGNOSIS — M54.2 CERVICALGIA: Chronic | ICD-10-CM

## 2022-03-01 ENCOUNTER — APPOINTMENT (OUTPATIENT)
Dept: PLASTIC SURGERY | Facility: CLINIC | Age: 47
End: 2022-03-01
Payer: MEDICARE

## 2022-03-01 ENCOUNTER — OUTPATIENT (OUTPATIENT)
Dept: OUTPATIENT SERVICES | Facility: HOSPITAL | Age: 47
LOS: 1 days | End: 2022-03-01

## 2022-03-01 DIAGNOSIS — D64.9 ANEMIA, UNSPECIFIED: ICD-10-CM

## 2022-03-01 DIAGNOSIS — I48.91 UNSPECIFIED ATRIAL FIBRILLATION: ICD-10-CM

## 2022-03-01 DIAGNOSIS — S71.101D UNSPECIFIED OPEN WOUND, RIGHT THIGH, SUBSEQUENT ENCOUNTER: ICD-10-CM

## 2022-03-01 DIAGNOSIS — Z98.84 BARIATRIC SURGERY STATUS: Chronic | ICD-10-CM

## 2022-03-01 DIAGNOSIS — M54.2 CERVICALGIA: Chronic | ICD-10-CM

## 2022-03-01 PROCEDURE — 99212 OFFICE O/P EST SF 10 MIN: CPT | Mod: 95

## 2022-03-01 NOTE — REASON FOR VISIT
[Other Location: e.g. School (Enter Location, City,State)___] : at [unfilled], at the time of the visit. [Medical Office: ([facility])___] : at the medical office located in  [Formal Caregiver] : formal caregiver [Verbal consent obtained from patient] : the patient, [unfilled] [Follow-Up: _____] : a [unfilled] follow-up visit

## 2022-03-01 NOTE — PHYSICAL EXAM
[de-identified] : Awake and alert. In very good spirits. Lying in a hospital bed. [de-identified] : EOMI [de-identified] : Perineum with intact skin, no maceration.\par Right thigh wound is closed, with just a linear scar, between 5-10 mm wide.\par No crusting. No erythema. [de-identified] : Oriented. Remembers me from the hospital and prior telehealth visits.

## 2022-03-01 NOTE — HISTORY OF PRESENT ILLNESS
[FreeTextEntry1] : Pt reports that she is doing very well.\par Feels like the wound is healed, but is looking forward to my assessment.

## 2022-03-01 NOTE — ASSESSMENT
[FreeTextEntry1] : Right thigh wound is now closed.\par No further wound care is needed.\par I do recommend moisturizing the scar with standard body lotion as scars tend to be dry.\par Follow up as needed.\par Call for any questions or concerns.

## 2022-03-03 ENCOUNTER — OUTPATIENT (OUTPATIENT)
Dept: OUTPATIENT SERVICES | Facility: HOSPITAL | Age: 47
LOS: 1 days | End: 2022-03-03

## 2022-03-03 DIAGNOSIS — Z00.00 ENCOUNTER FOR GENERAL ADULT MEDICAL EXAMINATION WITHOUT ABNORMAL FINDINGS: ICD-10-CM

## 2022-03-03 DIAGNOSIS — Z98.84 BARIATRIC SURGERY STATUS: Chronic | ICD-10-CM

## 2022-03-03 DIAGNOSIS — M54.2 CERVICALGIA: Chronic | ICD-10-CM

## 2022-03-08 ENCOUNTER — OUTPATIENT (OUTPATIENT)
Dept: OUTPATIENT SERVICES | Facility: HOSPITAL | Age: 47
LOS: 1 days | End: 2022-03-08

## 2022-03-08 DIAGNOSIS — Z98.84 BARIATRIC SURGERY STATUS: Chronic | ICD-10-CM

## 2022-03-08 DIAGNOSIS — I48.91 UNSPECIFIED ATRIAL FIBRILLATION: ICD-10-CM

## 2022-03-08 DIAGNOSIS — M54.2 CERVICALGIA: Chronic | ICD-10-CM

## 2022-03-15 ENCOUNTER — OUTPATIENT (OUTPATIENT)
Dept: OUTPATIENT SERVICES | Facility: HOSPITAL | Age: 47
LOS: 1 days | End: 2022-03-15

## 2022-03-15 DIAGNOSIS — Z98.84 BARIATRIC SURGERY STATUS: Chronic | ICD-10-CM

## 2022-03-15 DIAGNOSIS — M54.2 CERVICALGIA: Chronic | ICD-10-CM

## 2022-03-15 DIAGNOSIS — Z00.00 ENCOUNTER FOR GENERAL ADULT MEDICAL EXAMINATION WITHOUT ABNORMAL FINDINGS: ICD-10-CM

## 2022-03-22 ENCOUNTER — OUTPATIENT (OUTPATIENT)
Dept: OUTPATIENT SERVICES | Facility: HOSPITAL | Age: 47
LOS: 1 days | End: 2022-03-22

## 2022-03-22 DIAGNOSIS — Z98.84 BARIATRIC SURGERY STATUS: Chronic | ICD-10-CM

## 2022-03-22 DIAGNOSIS — Z00.00 ENCOUNTER FOR GENERAL ADULT MEDICAL EXAMINATION WITHOUT ABNORMAL FINDINGS: ICD-10-CM

## 2022-03-22 DIAGNOSIS — M54.2 CERVICALGIA: Chronic | ICD-10-CM

## 2022-03-29 ENCOUNTER — OUTPATIENT (OUTPATIENT)
Dept: OUTPATIENT SERVICES | Facility: HOSPITAL | Age: 47
LOS: 1 days | End: 2022-03-29

## 2022-03-29 DIAGNOSIS — Z53.8 PROCEDURE AND TREATMENT NOT CARRIED OUT FOR OTHER REASONS: ICD-10-CM

## 2022-03-29 DIAGNOSIS — M54.2 CERVICALGIA: Chronic | ICD-10-CM

## 2022-03-29 DIAGNOSIS — Z98.84 BARIATRIC SURGERY STATUS: Chronic | ICD-10-CM

## 2022-03-30 ENCOUNTER — OUTPATIENT (OUTPATIENT)
Dept: OUTPATIENT SERVICES | Facility: HOSPITAL | Age: 47
LOS: 1 days | End: 2022-03-30

## 2022-03-30 DIAGNOSIS — I82.402 ACUTE EMBOLISM AND THROMBOSIS OF UNSPECIFIED DEEP VEINS OF LEFT LOWER EXTREMITY: ICD-10-CM

## 2022-03-30 DIAGNOSIS — Z98.84 BARIATRIC SURGERY STATUS: Chronic | ICD-10-CM

## 2022-03-30 DIAGNOSIS — M54.2 CERVICALGIA: Chronic | ICD-10-CM

## 2022-04-01 ENCOUNTER — OUTPATIENT (OUTPATIENT)
Dept: OUTPATIENT SERVICES | Facility: HOSPITAL | Age: 47
LOS: 1 days | End: 2022-04-01

## 2022-04-01 DIAGNOSIS — Z00.00 ENCOUNTER FOR GENERAL ADULT MEDICAL EXAMINATION WITHOUT ABNORMAL FINDINGS: ICD-10-CM

## 2022-04-01 DIAGNOSIS — Z98.84 BARIATRIC SURGERY STATUS: Chronic | ICD-10-CM

## 2022-04-01 DIAGNOSIS — M54.2 CERVICALGIA: Chronic | ICD-10-CM

## 2022-04-05 ENCOUNTER — OUTPATIENT (OUTPATIENT)
Dept: OUTPATIENT SERVICES | Facility: HOSPITAL | Age: 47
LOS: 1 days | End: 2022-04-05

## 2022-04-05 DIAGNOSIS — I48.91 UNSPECIFIED ATRIAL FIBRILLATION: ICD-10-CM

## 2022-04-05 DIAGNOSIS — M54.2 CERVICALGIA: Chronic | ICD-10-CM

## 2022-04-05 DIAGNOSIS — Z98.84 BARIATRIC SURGERY STATUS: Chronic | ICD-10-CM

## 2022-04-12 ENCOUNTER — OUTPATIENT (OUTPATIENT)
Dept: OUTPATIENT SERVICES | Facility: HOSPITAL | Age: 47
LOS: 1 days | End: 2022-04-12

## 2022-04-12 DIAGNOSIS — D64.9 ANEMIA, UNSPECIFIED: ICD-10-CM

## 2022-04-12 DIAGNOSIS — Z98.84 BARIATRIC SURGERY STATUS: Chronic | ICD-10-CM

## 2022-04-12 DIAGNOSIS — M54.2 CERVICALGIA: Chronic | ICD-10-CM

## 2022-04-19 ENCOUNTER — OUTPATIENT (OUTPATIENT)
Dept: OUTPATIENT SERVICES | Facility: HOSPITAL | Age: 47
LOS: 1 days | End: 2022-04-19

## 2022-04-19 DIAGNOSIS — Z98.84 BARIATRIC SURGERY STATUS: Chronic | ICD-10-CM

## 2022-04-19 DIAGNOSIS — M54.2 CERVICALGIA: Chronic | ICD-10-CM

## 2022-04-19 DIAGNOSIS — D64.9 ANEMIA, UNSPECIFIED: ICD-10-CM

## 2022-04-27 ENCOUNTER — OUTPATIENT (OUTPATIENT)
Dept: OUTPATIENT SERVICES | Facility: HOSPITAL | Age: 47
LOS: 1 days | End: 2022-04-27

## 2022-04-27 DIAGNOSIS — D64.9 ANEMIA, UNSPECIFIED: ICD-10-CM

## 2022-04-27 DIAGNOSIS — Z98.84 BARIATRIC SURGERY STATUS: Chronic | ICD-10-CM

## 2022-04-27 DIAGNOSIS — M54.2 CERVICALGIA: Chronic | ICD-10-CM

## 2022-05-03 ENCOUNTER — OUTPATIENT (OUTPATIENT)
Dept: OUTPATIENT SERVICES | Facility: HOSPITAL | Age: 47
LOS: 1 days | End: 2022-05-03

## 2022-05-03 DIAGNOSIS — M54.2 CERVICALGIA: Chronic | ICD-10-CM

## 2022-05-03 DIAGNOSIS — D64.9 ANEMIA, UNSPECIFIED: ICD-10-CM

## 2022-05-03 DIAGNOSIS — Z98.84 BARIATRIC SURGERY STATUS: Chronic | ICD-10-CM

## 2022-05-06 ENCOUNTER — OUTPATIENT (OUTPATIENT)
Dept: OUTPATIENT SERVICES | Facility: HOSPITAL | Age: 47
LOS: 1 days | End: 2022-05-06

## 2022-05-06 DIAGNOSIS — Z98.84 BARIATRIC SURGERY STATUS: Chronic | ICD-10-CM

## 2022-05-06 DIAGNOSIS — I10 ESSENTIAL (PRIMARY) HYPERTENSION: ICD-10-CM

## 2022-05-06 DIAGNOSIS — N39.0 URINARY TRACT INFECTION, SITE NOT SPECIFIED: ICD-10-CM

## 2022-05-06 DIAGNOSIS — M54.2 CERVICALGIA: Chronic | ICD-10-CM

## 2022-05-11 ENCOUNTER — OUTPATIENT (OUTPATIENT)
Dept: OUTPATIENT SERVICES | Facility: HOSPITAL | Age: 47
LOS: 1 days | End: 2022-05-11

## 2022-05-11 DIAGNOSIS — D64.9 ANEMIA, UNSPECIFIED: ICD-10-CM

## 2022-05-11 DIAGNOSIS — M54.2 CERVICALGIA: Chronic | ICD-10-CM

## 2022-05-11 DIAGNOSIS — Z98.84 BARIATRIC SURGERY STATUS: Chronic | ICD-10-CM

## 2022-05-12 ENCOUNTER — OUTPATIENT (OUTPATIENT)
Dept: OUTPATIENT SERVICES | Facility: HOSPITAL | Age: 47
LOS: 1 days | End: 2022-05-12

## 2022-05-12 DIAGNOSIS — M54.2 CERVICALGIA: Chronic | ICD-10-CM

## 2022-05-12 DIAGNOSIS — I10 ESSENTIAL (PRIMARY) HYPERTENSION: ICD-10-CM

## 2022-05-12 DIAGNOSIS — Z98.84 BARIATRIC SURGERY STATUS: Chronic | ICD-10-CM

## 2022-05-13 ENCOUNTER — OUTPATIENT (OUTPATIENT)
Dept: OUTPATIENT SERVICES | Facility: HOSPITAL | Age: 47
LOS: 1 days | End: 2022-05-13

## 2022-05-13 DIAGNOSIS — M54.2 CERVICALGIA: Chronic | ICD-10-CM

## 2022-05-13 DIAGNOSIS — I10 ESSENTIAL (PRIMARY) HYPERTENSION: ICD-10-CM

## 2022-05-13 DIAGNOSIS — Z98.84 BARIATRIC SURGERY STATUS: Chronic | ICD-10-CM

## 2022-05-17 ENCOUNTER — OUTPATIENT (OUTPATIENT)
Dept: OUTPATIENT SERVICES | Facility: HOSPITAL | Age: 47
LOS: 1 days | End: 2022-05-17

## 2022-05-17 DIAGNOSIS — E78.2 MIXED HYPERLIPIDEMIA: ICD-10-CM

## 2022-05-17 DIAGNOSIS — Z98.84 BARIATRIC SURGERY STATUS: Chronic | ICD-10-CM

## 2022-05-17 DIAGNOSIS — M54.2 CERVICALGIA: Chronic | ICD-10-CM

## 2022-05-18 ENCOUNTER — OUTPATIENT (OUTPATIENT)
Dept: OUTPATIENT SERVICES | Facility: HOSPITAL | Age: 47
LOS: 1 days | End: 2022-05-18

## 2022-05-18 DIAGNOSIS — M54.2 CERVICALGIA: Chronic | ICD-10-CM

## 2022-05-18 DIAGNOSIS — Z98.84 BARIATRIC SURGERY STATUS: Chronic | ICD-10-CM

## 2022-05-18 DIAGNOSIS — D64.9 ANEMIA, UNSPECIFIED: ICD-10-CM

## 2022-05-19 ENCOUNTER — OUTPATIENT (OUTPATIENT)
Dept: OUTPATIENT SERVICES | Facility: HOSPITAL | Age: 47
LOS: 1 days | End: 2022-05-19

## 2022-05-19 DIAGNOSIS — Z98.84 BARIATRIC SURGERY STATUS: Chronic | ICD-10-CM

## 2022-05-19 DIAGNOSIS — D64.9 ANEMIA, UNSPECIFIED: ICD-10-CM

## 2022-05-19 DIAGNOSIS — M54.2 CERVICALGIA: Chronic | ICD-10-CM

## 2022-05-24 ENCOUNTER — OUTPATIENT (OUTPATIENT)
Dept: OUTPATIENT SERVICES | Facility: HOSPITAL | Age: 47
LOS: 1 days | End: 2022-05-24

## 2022-05-24 DIAGNOSIS — D64.9 ANEMIA, UNSPECIFIED: ICD-10-CM

## 2022-05-24 DIAGNOSIS — Z98.84 BARIATRIC SURGERY STATUS: Chronic | ICD-10-CM

## 2022-05-24 DIAGNOSIS — M54.2 CERVICALGIA: Chronic | ICD-10-CM

## 2022-05-31 ENCOUNTER — OUTPATIENT (OUTPATIENT)
Dept: OUTPATIENT SERVICES | Facility: HOSPITAL | Age: 47
LOS: 1 days | End: 2022-05-31

## 2022-05-31 DIAGNOSIS — I48.91 UNSPECIFIED ATRIAL FIBRILLATION: ICD-10-CM

## 2022-05-31 DIAGNOSIS — M54.2 CERVICALGIA: Chronic | ICD-10-CM

## 2022-05-31 DIAGNOSIS — Z98.84 BARIATRIC SURGERY STATUS: Chronic | ICD-10-CM

## 2022-06-07 ENCOUNTER — OUTPATIENT (OUTPATIENT)
Dept: OUTPATIENT SERVICES | Facility: HOSPITAL | Age: 47
LOS: 1 days | End: 2022-06-07

## 2022-06-07 DIAGNOSIS — M54.2 CERVICALGIA: Chronic | ICD-10-CM

## 2022-06-07 DIAGNOSIS — D64.9 ANEMIA, UNSPECIFIED: ICD-10-CM

## 2022-06-07 DIAGNOSIS — Z98.84 BARIATRIC SURGERY STATUS: Chronic | ICD-10-CM

## 2022-06-09 ENCOUNTER — OUTPATIENT (OUTPATIENT)
Dept: OUTPATIENT SERVICES | Facility: HOSPITAL | Age: 47
LOS: 1 days | End: 2022-06-09

## 2022-06-09 DIAGNOSIS — D64.9 ANEMIA, UNSPECIFIED: ICD-10-CM

## 2022-06-09 DIAGNOSIS — Z98.84 BARIATRIC SURGERY STATUS: Chronic | ICD-10-CM

## 2022-06-09 DIAGNOSIS — M54.2 CERVICALGIA: Chronic | ICD-10-CM

## 2022-06-14 ENCOUNTER — OUTPATIENT (OUTPATIENT)
Dept: OUTPATIENT SERVICES | Facility: HOSPITAL | Age: 47
LOS: 1 days | End: 2022-06-14

## 2022-06-14 DIAGNOSIS — Z98.84 BARIATRIC SURGERY STATUS: Chronic | ICD-10-CM

## 2022-06-14 DIAGNOSIS — Z00.00 ENCOUNTER FOR GENERAL ADULT MEDICAL EXAMINATION WITHOUT ABNORMAL FINDINGS: ICD-10-CM

## 2022-06-14 DIAGNOSIS — M54.2 CERVICALGIA: Chronic | ICD-10-CM

## 2022-06-16 ENCOUNTER — OUTPATIENT (OUTPATIENT)
Dept: OUTPATIENT SERVICES | Facility: HOSPITAL | Age: 47
LOS: 1 days | End: 2022-06-16

## 2022-06-16 DIAGNOSIS — I48.91 UNSPECIFIED ATRIAL FIBRILLATION: ICD-10-CM

## 2022-06-16 DIAGNOSIS — M54.2 CERVICALGIA: Chronic | ICD-10-CM

## 2022-06-16 DIAGNOSIS — Z98.84 BARIATRIC SURGERY STATUS: Chronic | ICD-10-CM

## 2022-06-21 ENCOUNTER — OUTPATIENT (OUTPATIENT)
Dept: OUTPATIENT SERVICES | Facility: HOSPITAL | Age: 47
LOS: 1 days | End: 2022-06-21

## 2022-06-21 DIAGNOSIS — Z98.84 BARIATRIC SURGERY STATUS: Chronic | ICD-10-CM

## 2022-06-21 DIAGNOSIS — D64.9 ANEMIA, UNSPECIFIED: ICD-10-CM

## 2022-06-21 DIAGNOSIS — M54.2 CERVICALGIA: Chronic | ICD-10-CM

## 2022-06-28 ENCOUNTER — OUTPATIENT (OUTPATIENT)
Dept: OUTPATIENT SERVICES | Facility: HOSPITAL | Age: 47
LOS: 1 days | End: 2022-06-28

## 2022-06-28 DIAGNOSIS — Z98.84 BARIATRIC SURGERY STATUS: Chronic | ICD-10-CM

## 2022-06-28 DIAGNOSIS — D64.9 ANEMIA, UNSPECIFIED: ICD-10-CM

## 2022-06-28 DIAGNOSIS — I48.91 UNSPECIFIED ATRIAL FIBRILLATION: ICD-10-CM

## 2022-06-28 DIAGNOSIS — M54.2 CERVICALGIA: Chronic | ICD-10-CM

## 2022-07-05 ENCOUNTER — OUTPATIENT (OUTPATIENT)
Dept: OUTPATIENT SERVICES | Facility: HOSPITAL | Age: 47
LOS: 1 days | End: 2022-07-05

## 2022-07-05 DIAGNOSIS — Z98.84 BARIATRIC SURGERY STATUS: Chronic | ICD-10-CM

## 2022-07-05 DIAGNOSIS — M54.2 CERVICALGIA: Chronic | ICD-10-CM

## 2022-07-05 DIAGNOSIS — D64.9 ANEMIA, UNSPECIFIED: ICD-10-CM

## 2022-07-12 ENCOUNTER — OUTPATIENT (OUTPATIENT)
Dept: OUTPATIENT SERVICES | Facility: HOSPITAL | Age: 47
LOS: 1 days | End: 2022-07-12

## 2022-07-12 DIAGNOSIS — D64.9 ANEMIA, UNSPECIFIED: ICD-10-CM

## 2022-07-12 DIAGNOSIS — I10 ESSENTIAL (PRIMARY) HYPERTENSION: ICD-10-CM

## 2022-07-12 DIAGNOSIS — Z98.84 BARIATRIC SURGERY STATUS: Chronic | ICD-10-CM

## 2022-07-12 DIAGNOSIS — M54.2 CERVICALGIA: Chronic | ICD-10-CM

## 2022-07-13 ENCOUNTER — OUTPATIENT (OUTPATIENT)
Dept: OUTPATIENT SERVICES | Facility: HOSPITAL | Age: 47
LOS: 1 days | End: 2022-07-13

## 2022-07-13 DIAGNOSIS — N39.0 URINARY TRACT INFECTION, SITE NOT SPECIFIED: ICD-10-CM

## 2022-07-13 DIAGNOSIS — M54.2 CERVICALGIA: Chronic | ICD-10-CM

## 2022-07-13 DIAGNOSIS — Z20.828 CONTACT WITH AND (SUSPECTED) EXPOSURE TO OTHER VIRAL COMMUNICABLE DISEASES: ICD-10-CM

## 2022-07-13 DIAGNOSIS — Z98.84 BARIATRIC SURGERY STATUS: Chronic | ICD-10-CM

## 2022-07-14 ENCOUNTER — OUTPATIENT (OUTPATIENT)
Dept: OUTPATIENT SERVICES | Facility: HOSPITAL | Age: 47
LOS: 1 days | End: 2022-07-14

## 2022-07-14 DIAGNOSIS — Z98.84 BARIATRIC SURGERY STATUS: Chronic | ICD-10-CM

## 2022-07-14 DIAGNOSIS — Z29.9 ENCOUNTER FOR PROPHYLACTIC MEASURES, UNSPECIFIED: ICD-10-CM

## 2022-07-14 DIAGNOSIS — M54.2 CERVICALGIA: Chronic | ICD-10-CM

## 2022-07-19 ENCOUNTER — OUTPATIENT (OUTPATIENT)
Dept: OUTPATIENT SERVICES | Facility: HOSPITAL | Age: 47
LOS: 1 days | End: 2022-07-19

## 2022-07-19 DIAGNOSIS — M54.2 CERVICALGIA: Chronic | ICD-10-CM

## 2022-07-19 DIAGNOSIS — I48.91 UNSPECIFIED ATRIAL FIBRILLATION: ICD-10-CM

## 2022-07-19 DIAGNOSIS — Z98.84 BARIATRIC SURGERY STATUS: Chronic | ICD-10-CM

## 2022-07-19 DIAGNOSIS — D64.9 ANEMIA, UNSPECIFIED: ICD-10-CM

## 2022-07-26 ENCOUNTER — OUTPATIENT (OUTPATIENT)
Dept: OUTPATIENT SERVICES | Facility: HOSPITAL | Age: 47
LOS: 1 days | End: 2022-07-26

## 2022-07-26 DIAGNOSIS — Z98.84 BARIATRIC SURGERY STATUS: Chronic | ICD-10-CM

## 2022-07-26 DIAGNOSIS — D64.9 ANEMIA, UNSPECIFIED: ICD-10-CM

## 2022-07-26 DIAGNOSIS — M54.2 CERVICALGIA: Chronic | ICD-10-CM

## 2022-07-28 ENCOUNTER — OUTPATIENT (OUTPATIENT)
Dept: OUTPATIENT SERVICES | Facility: HOSPITAL | Age: 47
LOS: 1 days | End: 2022-07-28

## 2022-07-28 DIAGNOSIS — Z98.84 BARIATRIC SURGERY STATUS: Chronic | ICD-10-CM

## 2022-07-28 DIAGNOSIS — I48.91 UNSPECIFIED ATRIAL FIBRILLATION: ICD-10-CM

## 2022-07-28 DIAGNOSIS — M54.2 CERVICALGIA: Chronic | ICD-10-CM

## 2022-08-02 ENCOUNTER — OUTPATIENT (OUTPATIENT)
Dept: OUTPATIENT SERVICES | Facility: HOSPITAL | Age: 47
LOS: 1 days | End: 2022-08-02

## 2022-08-02 DIAGNOSIS — D64.9 ANEMIA, UNSPECIFIED: ICD-10-CM

## 2022-08-02 DIAGNOSIS — M54.2 CERVICALGIA: Chronic | ICD-10-CM

## 2022-08-02 DIAGNOSIS — Z98.84 BARIATRIC SURGERY STATUS: Chronic | ICD-10-CM

## 2022-08-03 ENCOUNTER — OUTPATIENT (OUTPATIENT)
Dept: OUTPATIENT SERVICES | Facility: HOSPITAL | Age: 47
LOS: 1 days | End: 2022-08-03

## 2022-08-03 DIAGNOSIS — I48.91 UNSPECIFIED ATRIAL FIBRILLATION: ICD-10-CM

## 2022-08-03 DIAGNOSIS — M54.2 CERVICALGIA: Chronic | ICD-10-CM

## 2022-08-03 DIAGNOSIS — Z98.84 BARIATRIC SURGERY STATUS: Chronic | ICD-10-CM

## 2022-08-09 ENCOUNTER — OUTPATIENT (OUTPATIENT)
Dept: OUTPATIENT SERVICES | Facility: HOSPITAL | Age: 47
LOS: 1 days | End: 2022-08-09

## 2022-08-09 DIAGNOSIS — D64.9 ANEMIA, UNSPECIFIED: ICD-10-CM

## 2022-08-09 DIAGNOSIS — Z98.84 BARIATRIC SURGERY STATUS: Chronic | ICD-10-CM

## 2022-08-09 DIAGNOSIS — I10 ESSENTIAL (PRIMARY) HYPERTENSION: ICD-10-CM

## 2022-08-09 DIAGNOSIS — M54.2 CERVICALGIA: Chronic | ICD-10-CM

## 2022-08-11 ENCOUNTER — OUTPATIENT (OUTPATIENT)
Dept: OUTPATIENT SERVICES | Facility: HOSPITAL | Age: 47
LOS: 1 days | End: 2022-08-11

## 2022-08-11 DIAGNOSIS — D64.9 ANEMIA, UNSPECIFIED: ICD-10-CM

## 2022-08-11 DIAGNOSIS — M54.2 CERVICALGIA: Chronic | ICD-10-CM

## 2022-08-11 DIAGNOSIS — Z98.84 BARIATRIC SURGERY STATUS: Chronic | ICD-10-CM

## 2022-08-16 ENCOUNTER — OUTPATIENT (OUTPATIENT)
Dept: OUTPATIENT SERVICES | Facility: HOSPITAL | Age: 47
LOS: 1 days | End: 2022-08-16

## 2022-08-16 DIAGNOSIS — I48.91 UNSPECIFIED ATRIAL FIBRILLATION: ICD-10-CM

## 2022-08-16 DIAGNOSIS — M54.2 CERVICALGIA: Chronic | ICD-10-CM

## 2022-08-16 DIAGNOSIS — Z98.84 BARIATRIC SURGERY STATUS: Chronic | ICD-10-CM

## 2022-08-17 ENCOUNTER — OUTPATIENT (OUTPATIENT)
Dept: OUTPATIENT SERVICES | Facility: HOSPITAL | Age: 47
LOS: 1 days | End: 2022-08-17

## 2022-08-17 DIAGNOSIS — Z98.84 BARIATRIC SURGERY STATUS: Chronic | ICD-10-CM

## 2022-08-17 DIAGNOSIS — I48.91 UNSPECIFIED ATRIAL FIBRILLATION: ICD-10-CM

## 2022-08-17 DIAGNOSIS — M54.2 CERVICALGIA: Chronic | ICD-10-CM

## 2022-08-23 ENCOUNTER — OUTPATIENT (OUTPATIENT)
Dept: OUTPATIENT SERVICES | Facility: HOSPITAL | Age: 47
LOS: 1 days | End: 2022-08-23

## 2022-08-23 DIAGNOSIS — I48.91 UNSPECIFIED ATRIAL FIBRILLATION: ICD-10-CM

## 2022-08-23 DIAGNOSIS — M54.2 CERVICALGIA: Chronic | ICD-10-CM

## 2022-08-23 DIAGNOSIS — Z98.84 BARIATRIC SURGERY STATUS: Chronic | ICD-10-CM

## 2022-08-30 ENCOUNTER — OUTPATIENT (OUTPATIENT)
Dept: OUTPATIENT SERVICES | Facility: HOSPITAL | Age: 47
LOS: 1 days | End: 2022-08-30

## 2022-08-30 DIAGNOSIS — Z98.84 BARIATRIC SURGERY STATUS: Chronic | ICD-10-CM

## 2022-08-30 DIAGNOSIS — Z29.9 ENCOUNTER FOR PROPHYLACTIC MEASURES, UNSPECIFIED: ICD-10-CM

## 2022-08-30 DIAGNOSIS — M54.2 CERVICALGIA: Chronic | ICD-10-CM

## 2022-09-01 ENCOUNTER — OUTPATIENT (OUTPATIENT)
Dept: OUTPATIENT SERVICES | Facility: HOSPITAL | Age: 47
LOS: 1 days | End: 2022-09-01

## 2022-09-01 DIAGNOSIS — M54.2 CERVICALGIA: Chronic | ICD-10-CM

## 2022-09-01 DIAGNOSIS — Z29.9 ENCOUNTER FOR PROPHYLACTIC MEASURES, UNSPECIFIED: ICD-10-CM

## 2022-09-01 DIAGNOSIS — Z98.84 BARIATRIC SURGERY STATUS: Chronic | ICD-10-CM

## 2022-09-03 ENCOUNTER — OUTPATIENT (OUTPATIENT)
Dept: OUTPATIENT SERVICES | Facility: HOSPITAL | Age: 47
LOS: 1 days | End: 2022-09-03

## 2022-09-03 DIAGNOSIS — Z98.84 BARIATRIC SURGERY STATUS: Chronic | ICD-10-CM

## 2022-09-03 DIAGNOSIS — M54.2 CERVICALGIA: Chronic | ICD-10-CM

## 2022-09-06 ENCOUNTER — OUTPATIENT (OUTPATIENT)
Dept: OUTPATIENT SERVICES | Facility: HOSPITAL | Age: 47
LOS: 1 days | End: 2022-09-06

## 2022-09-06 DIAGNOSIS — B96.5 PSEUDOMONAS (AERUGINOSA) (MALLEI) (PSEUDOMALLEI) AS THE CAUSE OF DISEASES CLASSIFIED ELSEWHERE: ICD-10-CM

## 2022-09-06 DIAGNOSIS — Z98.84 BARIATRIC SURGERY STATUS: Chronic | ICD-10-CM

## 2022-09-06 DIAGNOSIS — M54.2 CERVICALGIA: Chronic | ICD-10-CM

## 2022-09-09 DIAGNOSIS — D64.9 ANEMIA, UNSPECIFIED: ICD-10-CM

## 2022-09-11 NOTE — DISCHARGE NOTE NURSING/CASE MANAGEMENT/SOCIAL WORK - NSDCPEFALRISK_GEN_ALL_CORE
For information on Fall & injury Prevention, visit [email or web address] No cyanosis, no pallor, no jaundice, no rash

## 2022-09-13 ENCOUNTER — OUTPATIENT (OUTPATIENT)
Dept: OUTPATIENT SERVICES | Facility: HOSPITAL | Age: 47
LOS: 1 days | End: 2022-09-13

## 2022-09-13 DIAGNOSIS — I48.91 UNSPECIFIED ATRIAL FIBRILLATION: ICD-10-CM

## 2022-09-13 DIAGNOSIS — M54.2 CERVICALGIA: Chronic | ICD-10-CM

## 2022-09-13 DIAGNOSIS — Z98.84 BARIATRIC SURGERY STATUS: Chronic | ICD-10-CM

## 2023-01-05 ENCOUNTER — OUTPATIENT (OUTPATIENT)
Dept: OUTPATIENT SERVICES | Facility: HOSPITAL | Age: 48
LOS: 1 days | End: 2023-01-05
Payer: MEDICARE

## 2023-01-05 DIAGNOSIS — Z98.84 BARIATRIC SURGERY STATUS: Chronic | ICD-10-CM

## 2023-01-05 DIAGNOSIS — I82.409 ACUTE EMBOLISM AND THROMBOSIS OF UNSPECIFIED DEEP VEINS OF UNSPECIFIED LOWER EXTREMITY: ICD-10-CM

## 2023-01-05 DIAGNOSIS — M54.2 CERVICALGIA: Chronic | ICD-10-CM

## 2023-01-09 ENCOUNTER — APPOINTMENT (OUTPATIENT)
Dept: HEMATOLOGY ONCOLOGY | Facility: CLINIC | Age: 48
End: 2023-01-09

## 2023-01-09 ENCOUNTER — NON-APPOINTMENT (OUTPATIENT)
Age: 48
End: 2023-01-09

## 2023-01-12 ENCOUNTER — NON-APPOINTMENT (OUTPATIENT)
Age: 48
End: 2023-01-12

## 2023-01-12 ENCOUNTER — RESULT REVIEW (OUTPATIENT)
Age: 48
End: 2023-01-12

## 2023-01-12 ENCOUNTER — APPOINTMENT (OUTPATIENT)
Dept: HEMATOLOGY ONCOLOGY | Facility: CLINIC | Age: 48
End: 2023-01-12
Payer: MEDICARE

## 2023-01-12 VITALS
SYSTOLIC BLOOD PRESSURE: 104 MMHG | DIASTOLIC BLOOD PRESSURE: 70 MMHG | HEART RATE: 67 BPM | TEMPERATURE: 98.3 F | OXYGEN SATURATION: 100 %

## 2023-01-12 DIAGNOSIS — D64.9 ANEMIA, UNSPECIFIED: ICD-10-CM

## 2023-01-12 DIAGNOSIS — Z86.69 PERSONAL HISTORY OF OTHER DISEASES OF THE NERVOUS SYSTEM AND SENSE ORGANS: ICD-10-CM

## 2023-01-12 DIAGNOSIS — Z86.718 PERSONAL HISTORY OF OTHER VENOUS THROMBOSIS AND EMBOLISM: ICD-10-CM

## 2023-01-12 LAB
BASOPHILS # BLD AUTO: 0.05 K/UL — SIGNIFICANT CHANGE UP (ref 0–0.2)
BASOPHILS NFR BLD AUTO: 1 % — SIGNIFICANT CHANGE UP (ref 0–2)
EOSINOPHIL # BLD AUTO: 0.25 K/UL — SIGNIFICANT CHANGE UP (ref 0–0.5)
EOSINOPHIL NFR BLD AUTO: 4.8 % — SIGNIFICANT CHANGE UP (ref 0–6)
HCT VFR BLD CALC: 37 % — SIGNIFICANT CHANGE UP (ref 34.5–45)
HGB BLD-MCNC: 10.9 G/DL — LOW (ref 11.5–15.5)
IMM GRANULOCYTES NFR BLD AUTO: 0.6 % — SIGNIFICANT CHANGE UP (ref 0–0.9)
LYMPHOCYTES # BLD AUTO: 1.22 K/UL — SIGNIFICANT CHANGE UP (ref 1–3.3)
LYMPHOCYTES # BLD AUTO: 23.5 % — SIGNIFICANT CHANGE UP (ref 13–44)
MCHC RBC-ENTMCNC: 27.3 PG — SIGNIFICANT CHANGE UP (ref 27–34)
MCHC RBC-ENTMCNC: 29.5 GM/DL — LOW (ref 32–36)
MCV RBC AUTO: 92.7 FL — SIGNIFICANT CHANGE UP (ref 80–100)
MONOCYTES # BLD AUTO: 0.38 K/UL — SIGNIFICANT CHANGE UP (ref 0–0.9)
MONOCYTES NFR BLD AUTO: 7.3 % — SIGNIFICANT CHANGE UP (ref 2–14)
NEUTROPHILS # BLD AUTO: 3.27 K/UL — SIGNIFICANT CHANGE UP (ref 1.8–7.4)
NEUTROPHILS NFR BLD AUTO: 62.8 % — SIGNIFICANT CHANGE UP (ref 43–77)
NRBC # BLD: 0 /100 WBCS — SIGNIFICANT CHANGE UP (ref 0–0)
PLATELET # BLD AUTO: 401 K/UL — HIGH (ref 150–400)
RBC # BLD: 3.99 M/UL — SIGNIFICANT CHANGE UP (ref 3.8–5.2)
RBC # FLD: 15.8 % — HIGH (ref 10.3–14.5)
WBC # BLD: 5.2 K/UL — SIGNIFICANT CHANGE UP (ref 3.8–10.5)
WBC # FLD AUTO: 5.2 K/UL — SIGNIFICANT CHANGE UP (ref 3.8–10.5)

## 2023-01-12 PROCEDURE — 99214 OFFICE O/P EST MOD 30 MIN: CPT

## 2023-01-12 PROCEDURE — 85027 COMPLETE CBC AUTOMATED: CPT

## 2023-01-13 LAB
ALBUMIN SERPL ELPH-MCNC: 3.7 G/DL
ALP BLD-CCNC: 361 U/L
ALT SERPL-CCNC: 17 U/L
ANION GAP SERPL CALC-SCNC: 11 MMOL/L
AST SERPL-CCNC: 25 U/L
BILIRUB SERPL-MCNC: 0.4 MG/DL
BUN SERPL-MCNC: 20 MG/DL
CALCIUM SERPL-MCNC: 9.4 MG/DL
CHLORIDE SERPL-SCNC: 107 MMOL/L
CO2 SERPL-SCNC: 24 MMOL/L
CREAT SERPL-MCNC: 0.57 MG/DL
EGFR: 111 ML/MIN/1.73M2
FERRITIN SERPL-MCNC: 330 NG/ML
FOLATE SERPL-MCNC: >20 NG/ML
GLUCOSE SERPL-MCNC: 86 MG/DL
HCG SERPL-MCNC: 10 MIU/ML
IRON SATN MFR SERPL: 14 %
IRON SERPL-MCNC: 40 UG/DL
POTASSIUM SERPL-SCNC: 4.5 MMOL/L
PROT SERPL-MCNC: 6.7 G/DL
SODIUM SERPL-SCNC: 142 MMOL/L
TIBC SERPL-MCNC: 290 UG/DL
TRANSFERRIN SERPL-MCNC: 238 MG/DL
UIBC SERPL-MCNC: 250 UG/DL
VIT B12 SERPL-MCNC: 639 PG/ML

## 2023-04-09 ENCOUNTER — OUTPATIENT (OUTPATIENT)
Dept: OUTPATIENT SERVICES | Facility: HOSPITAL | Age: 48
LOS: 1 days | End: 2023-04-09

## 2023-04-09 DIAGNOSIS — Z98.84 BARIATRIC SURGERY STATUS: Chronic | ICD-10-CM

## 2023-04-09 DIAGNOSIS — M54.2 CERVICALGIA: Chronic | ICD-10-CM

## 2023-04-09 DIAGNOSIS — I82.409 ACUTE EMBOLISM AND THROMBOSIS OF UNSPECIFIED DEEP VEINS OF UNSPECIFIED LOWER EXTREMITY: ICD-10-CM

## 2023-04-21 ENCOUNTER — APPOINTMENT (OUTPATIENT)
Age: 48
End: 2023-04-21

## 2023-04-21 NOTE — RESULTS/DATA
[FreeTextEntry1] : Ms. Diaz is a 47 year old paraplegic female who present for f/u from recent hospitalization following fall from her wheelchair and subsequent anemia, was on coumadin from prior DVT. Now in Eliquis.

## 2023-04-21 NOTE — PHYSICAL EXAM
[de-identified] : Paraplegic [de-identified] : Paraplegic-in wheelchair, BLE with ace wraps and immobilizers [de-identified] : A&Ox3, pleasant. Paraplegic

## 2023-04-21 NOTE — REVIEW OF SYSTEMS
[Fever] : no fever [Night Sweats] : no night sweats [Chills] : no chills [Fatigue] : no fatigue [Vision Problems] : no vision problems [Loss of Hearing] : no loss of hearing [Nosebleeds] : no nosebleeds [Chest Pain] : no chest pain [Palpitations] : no palpitations [Shortness Of Breath] : no shortness of breath [Wheezing] : no wheezing [Cough] : no cough [SOB on Exertion] : no shortness of breath during exertion [Abdominal Pain] : no abdominal pain [Diarrhea] : no diarrhea [Skin Rash] : no skin rash [Confused] : no confusion [Dizziness] : no dizziness [Easy Bleeding] : no tendency for easy bleeding [FreeTextEntry5] : LLE edema [FreeTextEntry8] : Post-menopausal [FreeTextEntry9] : Paraplegia from breast down [de-identified] : Has bed sore on buttocks [de-identified] : Paraplegia  [de-identified] : Paraplegia

## 2023-04-21 NOTE — HISTORY OF PRESENT ILLNESS
[de-identified] : Ms. Diaz is a 47 year old female with PMH of parapelegia, h/o  DVT was on coumadin , but now on Eliquis, remote hx of seizures,  who was seen by Dr. Hernandez on 12/23/22 at [facility] for anemia.\par \par Dolores presented to the ED due to fall from her motorized wheelchair, and sustained bilateral LE femur fractures and right ankle Fx.  Hematology consulted for worsening anemia.  She was taking coumadin due to her history of DVT. HGB on admit was 11.1, then dropped to 7.3. She did receive 1u PRBC's.  Anemia,  felt to be 2/2 acute blood loss from recent bilateral lower extremity fractures. She will remain at high risk for  future clots given her paraplegia and was switched to eliquis.\par \par

## 2023-06-19 ENCOUNTER — OUTPATIENT (OUTPATIENT)
Dept: OUTPATIENT SERVICES | Facility: HOSPITAL | Age: 48
LOS: 1 days | End: 2023-06-19

## 2023-06-19 DIAGNOSIS — Z98.84 BARIATRIC SURGERY STATUS: Chronic | ICD-10-CM

## 2023-06-19 DIAGNOSIS — M54.2 CERVICALGIA: Chronic | ICD-10-CM

## 2023-06-19 DIAGNOSIS — I82.409 ACUTE EMBOLISM AND THROMBOSIS OF UNSPECIFIED DEEP VEINS OF UNSPECIFIED LOWER EXTREMITY: ICD-10-CM

## 2023-06-26 ENCOUNTER — APPOINTMENT (OUTPATIENT)
Age: 48
End: 2023-06-26

## 2024-04-23 ENCOUNTER — NON-APPOINTMENT (OUTPATIENT)
Age: 49
End: 2024-04-23

## 2024-04-23 ENCOUNTER — APPOINTMENT (OUTPATIENT)
Dept: OPHTHALMOLOGY | Facility: CLINIC | Age: 49
End: 2024-04-23
Payer: MEDICARE

## 2024-04-23 ENCOUNTER — APPOINTMENT (OUTPATIENT)
Dept: OPHTHALMOLOGY | Facility: CLINIC | Age: 49
End: 2024-04-23
Payer: SELF-PAY

## 2024-04-23 PROCEDURE — 92015 DETERMINE REFRACTIVE STATE: CPT

## 2024-04-23 PROCEDURE — 92310 CONTACT LENS FITTING OU: CPT

## 2024-04-23 PROCEDURE — 92004 COMPRE OPH EXAM NEW PT 1/>: CPT

## 2024-05-22 ENCOUNTER — APPOINTMENT (OUTPATIENT)
Dept: OPHTHALMOLOGY | Facility: CLINIC | Age: 49
End: 2024-05-22
Payer: SELF-PAY

## 2024-05-22 ENCOUNTER — NON-APPOINTMENT (OUTPATIENT)
Age: 49
End: 2024-05-22

## 2024-05-22 PROCEDURE — 99199 UNLISTED SPECIAL SVC PX/RPRT: CPT | Mod: NC

## 2025-09-10 ENCOUNTER — APPOINTMENT (OUTPATIENT)
Dept: OPHTHALMOLOGY | Facility: CLINIC | Age: 50
End: 2025-09-10
Payer: MEDICARE

## 2025-09-10 ENCOUNTER — NON-APPOINTMENT (OUTPATIENT)
Age: 50
End: 2025-09-10

## 2025-09-10 PROCEDURE — 92014 COMPRE OPH EXAM EST PT 1/>: CPT
